# Patient Record
Sex: MALE | Race: WHITE | Employment: FULL TIME | URBAN - METROPOLITAN AREA
[De-identification: names, ages, dates, MRNs, and addresses within clinical notes are randomized per-mention and may not be internally consistent; named-entity substitution may affect disease eponyms.]

---

## 2019-06-11 ENCOUNTER — OFFICE VISIT (OUTPATIENT)
Dept: FAMILY MEDICINE CLINIC | Facility: CLINIC | Age: 29
End: 2019-06-11
Payer: MEDICAID

## 2019-06-11 VITALS
TEMPERATURE: 99 F | SYSTOLIC BLOOD PRESSURE: 114 MMHG | HEIGHT: 70.9 IN | WEIGHT: 315 LBS | BODY MASS INDEX: 44.1 KG/M2 | HEART RATE: 69 BPM | DIASTOLIC BLOOD PRESSURE: 81 MMHG

## 2019-06-11 DIAGNOSIS — I10 ESSENTIAL HYPERTENSION: Primary | ICD-10-CM

## 2019-06-11 DIAGNOSIS — J45.40 MODERATE PERSISTENT ASTHMA WITHOUT COMPLICATION: ICD-10-CM

## 2019-06-11 PROBLEM — J45.909 ASTHMA: Status: ACTIVE | Noted: 2019-06-11

## 2019-06-11 PROBLEM — J45.909 ASTHMA (HCC): Status: ACTIVE | Noted: 2019-06-11

## 2019-06-11 PROCEDURE — 99212 OFFICE O/P EST SF 10 MIN: CPT | Performed by: FAMILY MEDICINE

## 2019-06-11 PROCEDURE — 99203 OFFICE O/P NEW LOW 30 MIN: CPT | Performed by: FAMILY MEDICINE

## 2019-06-11 RX ORDER — DOXYCYCLINE HYCLATE 100 MG/1
CAPSULE ORAL
Refills: 0 | COMMUNITY
Start: 2019-05-22 | End: 2019-06-11

## 2019-06-11 RX ORDER — CETIRIZINE HYDROCHLORIDE 10 MG/1
TABLET ORAL
Refills: 0 | COMMUNITY
Start: 2019-06-03 | End: 2019-11-20

## 2019-06-11 RX ORDER — ERGOCALCIFEROL 1.25 MG/1
CAPSULE ORAL WEEKLY
Refills: 0 | COMMUNITY
Start: 2019-06-10

## 2019-06-11 RX ORDER — BUDESONIDE AND FORMOTEROL FUMARATE DIHYDRATE 160; 4.5 UG/1; UG/1
AEROSOL RESPIRATORY (INHALATION)
Refills: 0 | COMMUNITY
Start: 2019-05-20 | End: 2019-06-11

## 2019-06-11 RX ORDER — LOSARTAN POTASSIUM 100 MG/1
TABLET ORAL
Refills: 5 | COMMUNITY
Start: 2019-04-10 | End: 2019-06-11

## 2019-06-11 RX ORDER — IPRATROPIUM BROMIDE AND ALBUTEROL SULFATE 2.5; .5 MG/3ML; MG/3ML
SOLUTION RESPIRATORY (INHALATION)
Refills: 0 | COMMUNITY
Start: 2019-06-07 | End: 2019-06-11

## 2019-06-11 RX ORDER — CEFDINIR 300 MG/1
CAPSULE ORAL
Refills: 0 | COMMUNITY
Start: 2019-06-04 | End: 2019-07-11

## 2019-06-11 RX ORDER — IPRATROPIUM BROMIDE AND ALBUTEROL SULFATE 2.5; .5 MG/3ML; MG/3ML
SOLUTION RESPIRATORY (INHALATION)
Qty: 1 VIAL | Refills: 5 | Status: ON HOLD | OUTPATIENT
Start: 2019-06-11 | End: 2020-01-10

## 2019-06-11 RX ORDER — METOCLOPRAMIDE 10 MG/1
TABLET ORAL
Refills: 0 | COMMUNITY
Start: 2019-05-25 | End: 2019-07-11

## 2019-06-11 RX ORDER — NAPROXEN 375 MG/1
TABLET ORAL
Refills: 0 | COMMUNITY
Start: 2019-06-03 | End: 2019-06-11

## 2019-06-11 RX ORDER — ZOLPIDEM TARTRATE 10 MG/1
TABLET ORAL
Refills: 0 | COMMUNITY
Start: 2019-01-08 | End: 2019-06-11

## 2019-06-11 RX ORDER — LOSARTAN POTASSIUM 100 MG/1
100 TABLET ORAL DAILY
Qty: 30 TABLET | Refills: 5 | Status: SHIPPED | OUTPATIENT
Start: 2019-06-11 | End: 2019-11-04 | Stop reason: ALTCHOICE

## 2019-06-11 RX ORDER — 1.1% SODIUM FLUORIDE 11 MG/G
GEL DENTAL DAILY
Refills: 5 | COMMUNITY
Start: 2019-03-20 | End: 2019-09-12

## 2019-06-11 RX ORDER — LORAZEPAM 0.5 MG/1
TABLET ORAL
Refills: 0 | COMMUNITY
Start: 2019-06-04 | End: 2019-07-11

## 2019-06-11 RX ORDER — ALPRAZOLAM 0.5 MG/1
TABLET ORAL
Refills: 0 | COMMUNITY
Start: 2019-05-22 | End: 2019-07-11

## 2019-06-11 RX ORDER — FLUTICASONE PROPIONATE 50 MCG
SPRAY, SUSPENSION (ML) NASAL
Qty: 1 INHALER | Refills: 5 | Status: SHIPPED | OUTPATIENT
Start: 2019-06-11 | End: 2020-03-10

## 2019-06-11 RX ORDER — BUDESONIDE AND FORMOTEROL FUMARATE DIHYDRATE 160; 4.5 UG/1; UG/1
AEROSOL RESPIRATORY (INHALATION)
Qty: 1 INHALER | Refills: 5 | Status: SHIPPED | OUTPATIENT
Start: 2019-06-11 | End: 2019-08-20

## 2019-06-11 RX ORDER — FLUTICASONE PROPIONATE 50 MCG
SPRAY, SUSPENSION (ML) NASAL
Refills: 0 | COMMUNITY
Start: 2019-06-04 | End: 2019-06-11

## 2019-06-11 RX ORDER — PREDNISONE 1 MG/1
TABLET ORAL
Refills: 0 | COMMUNITY
Start: 2019-06-04 | End: 2019-06-11

## 2019-06-11 NOTE — PROGRESS NOTES
HPI:    Patient ID: Verónica Miranda is a 33year old male. HPI  Patient presents with:  Establish Care: Patient here to establish care  Asthma and hypertension  Review of Systems   Constitutional: Negative. HENT: Negative.     Respiratory: Positive for hypertension  (primary encounter diagnosis)  Moderate persistent asthma without complication    At this time he is stable. He had five years of medical records with him copied. I reviewed with most of it being acute visits. I did not scan.   Renewed asthma

## 2019-07-09 RX ORDER — METOCLOPRAMIDE 10 MG/1
TABLET ORAL
Qty: 30 TABLET | Refills: 0 | OUTPATIENT
Start: 2019-07-09

## 2019-07-09 NOTE — TELEPHONE ENCOUNTER
Review pended refill request as it does not fall under a protocol.     Requested Prescriptions     Pending Prescriptions Disp Refills   • Metoclopramide HCl 10 MG Oral Tab 30 tablet 0     Sig: TAKE 1 TABLET BY MOUTH ONCE DAILY AS NEEDED         Recent Visit

## 2019-07-10 NOTE — TELEPHONE ENCOUNTER
Refusal reason: Appt required, please call patient     Call center please call and schedule an appointment. Thank You.

## 2019-07-11 ENCOUNTER — OFFICE VISIT (OUTPATIENT)
Dept: FAMILY MEDICINE CLINIC | Facility: CLINIC | Age: 29
End: 2019-07-11
Payer: MEDICAID

## 2019-07-11 VITALS
DIASTOLIC BLOOD PRESSURE: 83 MMHG | TEMPERATURE: 98 F | WEIGHT: 315 LBS | HEART RATE: 90 BPM | BODY MASS INDEX: 44.1 KG/M2 | HEIGHT: 70.9 IN | SYSTOLIC BLOOD PRESSURE: 137 MMHG

## 2019-07-11 DIAGNOSIS — R51.9 FREQUENT HEADACHES: ICD-10-CM

## 2019-07-11 DIAGNOSIS — K58.0 IRRITABLE BOWEL SYNDROME WITH DIARRHEA: Primary | ICD-10-CM

## 2019-07-11 PROCEDURE — 99213 OFFICE O/P EST LOW 20 MIN: CPT | Performed by: PHYSICIAN ASSISTANT

## 2019-07-11 RX ORDER — PREDNISONE 1 MG/1
TABLET ORAL AS NEEDED
COMMUNITY
End: 2019-09-12

## 2019-07-11 RX ORDER — METOCLOPRAMIDE 10 MG/1
TABLET ORAL
Qty: 30 TABLET | Refills: 0 | Status: SHIPPED | OUTPATIENT
Start: 2019-07-11 | End: 2019-08-05 | Stop reason: ALTCHOICE

## 2019-07-11 RX ORDER — ALBUTEROL SULFATE 90 UG/1
AEROSOL, METERED RESPIRATORY (INHALATION)
COMMUNITY
End: 2019-11-04

## 2019-07-11 NOTE — PROGRESS NOTES
HPI:    Patient ID: Hamp Crigler is a 33year old male. Patient presents for headaches and medication refill. Patient wants a refill of his Reglan because he has frequent nausea due to IBSD. He takes Reglan which helps relieve the nausea.  He was last Rfl: 0   Albuterol Sulfate HFA (PROAIR HFA) 108 (90 Base) MCG/ACT Inhalation Aero Soln ProAir HFA 90 mcg/actuation aerosol inhaler Inhale 2 puffs every 4 hours by inhalation route.  Disp:  Rfl:    ergocalciferol 98731 units Oral Cap  Disp:  Rfl: 0   SF 1.1 round, and reactive to light. Conjunctivae and EOM are normal. Right eye exhibits no discharge. Left eye exhibits no discharge. Neck: Normal range of motion. Neck supple. Cardiovascular: Normal rate, regular rhythm and normal heart sounds.     Pulmonary

## 2019-07-15 ENCOUNTER — OFFICE VISIT (OUTPATIENT)
Dept: FAMILY MEDICINE CLINIC | Facility: CLINIC | Age: 29
End: 2019-07-15
Payer: MEDICAID

## 2019-07-15 VITALS
HEART RATE: 88 BPM | DIASTOLIC BLOOD PRESSURE: 93 MMHG | WEIGHT: 315 LBS | TEMPERATURE: 99 F | SYSTOLIC BLOOD PRESSURE: 139 MMHG | HEIGHT: 70.9 IN | BODY MASS INDEX: 44.1 KG/M2

## 2019-07-15 DIAGNOSIS — J45.40 MODERATE PERSISTENT ASTHMA, UNSPECIFIED WHETHER COMPLICATED: Primary | ICD-10-CM

## 2019-07-15 DIAGNOSIS — F41.9 ANXIETY: ICD-10-CM

## 2019-07-15 PROCEDURE — 99213 OFFICE O/P EST LOW 20 MIN: CPT | Performed by: PHYSICIAN ASSISTANT

## 2019-07-15 RX ORDER — LORAZEPAM 0.5 MG/1
TABLET ORAL AS NEEDED
Refills: 0 | COMMUNITY
Start: 2019-07-14 | End: 2020-07-16

## 2019-07-15 NOTE — PROGRESS NOTES
HPI:    Patient ID: Barbie Ledezma is a 33year old male. Patient presents for a request for a referral for a pulmonologist. Patient sees pulmonology to for adjustment of his asthma medications.  Patient takes Symbicort and ipratropium/albuterol nebulize LORazepam 0.5 MG Oral Tab  Disp:  Rfl: 0     Allergies:  Haldol [Haloperidol]    OTHER (SEE COMMENTS)    Comment:Cannot sleep, psychiatric problem  Mucinex Dm Maximum *    OTHER (SEE COMMENTS)    Comment:Stomach cramps  Zofran [Ondansetron]    OTHER (SEE Prescriptions      No prescriptions requested or ordered in this encounter       Imaging & Referrals:  PULMONARY - INTERNAL  OP REFERRAL TO SW[facility]NIL TSAI         AJ#[id number]

## 2019-08-05 ENCOUNTER — TELEPHONE (OUTPATIENT)
Dept: GASTROENTEROLOGY | Facility: CLINIC | Age: 29
End: 2019-08-05

## 2019-08-05 ENCOUNTER — OFFICE VISIT (OUTPATIENT)
Dept: GASTROENTEROLOGY | Facility: CLINIC | Age: 29
End: 2019-08-05
Payer: MEDICAID

## 2019-08-05 VITALS
WEIGHT: 315 LBS | SYSTOLIC BLOOD PRESSURE: 134 MMHG | DIASTOLIC BLOOD PRESSURE: 87 MMHG | BODY MASS INDEX: 45.1 KG/M2 | HEART RATE: 101 BPM | HEIGHT: 70 IN

## 2019-08-05 DIAGNOSIS — R11.2 NAUSEA AND VOMITING, INTRACTABILITY OF VOMITING NOT SPECIFIED, UNSPECIFIED VOMITING TYPE: Primary | ICD-10-CM

## 2019-08-05 DIAGNOSIS — K58.0 IRRITABLE BOWEL SYNDROME WITH DIARRHEA: ICD-10-CM

## 2019-08-05 PROCEDURE — 99244 OFF/OP CNSLTJ NEW/EST MOD 40: CPT | Performed by: INTERNAL MEDICINE

## 2019-08-05 RX ORDER — METOCLOPRAMIDE 5 MG/1
10 TABLET ORAL DAILY
Qty: 30 TABLET | Refills: 0 | Status: ON HOLD | OUTPATIENT
Start: 2019-08-05 | End: 2020-01-10

## 2019-08-05 NOTE — TELEPHONE ENCOUNTER
We received medical records from [facility] stating that there are No EGD or Colonoscopy from Jan 2015 until but we received labs, CT and US of abdomen placed on Dr Babita Richardson desk for review

## 2019-08-05 NOTE — H&P
[facility] - Gastroenterology  Clinic History and Physical     Patient presents with:  Gastro-esophageal Reflux  IBS-D    HPI:   Davonte Akins is a 33year old year-old male with history of asthma, anxiety, bipolar disorder, ADHD who presents to  Smoker      Smokeless tobacco: Never Used    Alcohol use: Never      Frequency: Never    Drug use: Never       Medications (Active prior to today's visit):    Current Outpatient Medications:  Metoclopramide HCl 5 MG Oral Tab Take 2 tablets (10 mg total) by intolerance and heat intolerance  MUSCULOSKELETAL:  negative for joint effusion/severe erythema  BEHAVIOR/PSYCH:  negative for psychotic behavior      PHYSICAL EXAM:   /87 (BP Location: Left arm, Patient Position: Sitting, Cuff Size: large)   Pulse 1 FODMAP diet  Reglan or metoclopramide is a medication used to treat gastroparesis or abnormal stomach emptying. The drug is effective in this regard and also helps with nausea and vomiting.  Is going   Given that patient has been stable for >3 years on cesar

## 2019-08-05 NOTE — PATIENT INSTRUCTIONS
Obtain records from prior GI-- (per patient stool test 48 collection, breath test, labs etc.)  PPI recommended but will take as needed as current regimen works  Does not want colonoscopy given workup was done and age.  No weight loss or blood in the stool

## 2019-08-06 ENCOUNTER — OFFICE VISIT (OUTPATIENT)
Dept: PULMONOLOGY | Facility: CLINIC | Age: 29
End: 2019-08-06
Payer: MEDICAID

## 2019-08-06 VITALS
RESPIRATION RATE: 18 BRPM | OXYGEN SATURATION: 93 % | HEIGHT: 70 IN | DIASTOLIC BLOOD PRESSURE: 83 MMHG | HEART RATE: 91 BPM | WEIGHT: 315 LBS | BODY MASS INDEX: 45.1 KG/M2 | SYSTOLIC BLOOD PRESSURE: 131 MMHG

## 2019-08-06 DIAGNOSIS — R06.00 DYSPNEA, UNSPECIFIED TYPE: Primary | ICD-10-CM

## 2019-08-06 DIAGNOSIS — G47.10 HYPERSOMNIA: ICD-10-CM

## 2019-08-06 PROCEDURE — 99244 OFF/OP CNSLTJ NEW/EST MOD 40: CPT | Performed by: INTERNAL MEDICINE

## 2019-08-06 RX ORDER — MONTELUKAST SODIUM 10 MG/1
10 TABLET ORAL NIGHTLY
Qty: 30 TABLET | Refills: 5 | Status: SHIPPED | OUTPATIENT
Start: 2019-08-06 | End: 2020-01-27

## 2019-08-06 NOTE — H&P
Referring Physician  Jade Dempsey DO    Chief Complaint  Asthma    History of Present Illness  Patient is a 30-year-old male who presents to pulmonary clinic for initial visit.   He admits to being diagnosed in asthma in 2014 after evidence of multifoca mg total) by mouth daily.  Disp: 30 tablet Rfl: 0   LORazepam 0.5 MG Oral Tab  Disp:  Rfl: 0   Albuterol Sulfate HFA (PROAIR HFA) 108 (90 Base) MCG/ACT Inhalation Aero Soln ProAir HFA 90 mcg/actuation aerosol inhaler Inhale 2 puffs every 4 hours by [employer] testing for the patient. In terms of maintenance inhaler therapy I will have him on scheduled ICS/LABA and monitor response. For now I will hold off on Stiolto which is not covered under his insurance. I encouraged use of nebulizer treatments.   I urged

## 2019-08-20 ENCOUNTER — TELEPHONE (OUTPATIENT)
Dept: NEUROLOGY | Facility: CLINIC | Age: 29
End: 2019-08-20

## 2019-08-20 ENCOUNTER — OFFICE VISIT (OUTPATIENT)
Dept: NEUROLOGY | Facility: CLINIC | Age: 29
End: 2019-08-20
Payer: MEDICAID

## 2019-08-20 ENCOUNTER — TELEPHONE (OUTPATIENT)
Dept: PULMONOLOGY | Facility: CLINIC | Age: 29
End: 2019-08-20

## 2019-08-20 VITALS
HEIGHT: 71 IN | DIASTOLIC BLOOD PRESSURE: 100 MMHG | BODY MASS INDEX: 44.1 KG/M2 | SYSTOLIC BLOOD PRESSURE: 154 MMHG | WEIGHT: 315 LBS | HEART RATE: 88 BPM | RESPIRATION RATE: 20 BRPM

## 2019-08-20 DIAGNOSIS — R51.9 NEW ONSET HEADACHE: Primary | ICD-10-CM

## 2019-08-20 DIAGNOSIS — G47.33 OSA (OBSTRUCTIVE SLEEP APNEA): Primary | ICD-10-CM

## 2019-08-20 PROCEDURE — 99204 OFFICE O/P NEW MOD 45 MIN: CPT | Performed by: OTHER

## 2019-08-20 NOTE — TELEPHONE ENCOUNTER
Alejandro for [facility] [employer] for authorization of approval for MRI brain w/wo cpt code 77374. Case Number: [id number], will fax clinical note.  Faxed note pending approval.

## 2019-08-20 NOTE — PROGRESS NOTES
Neurology Initial Visit     Referred By: Dr. Pina ref. provider found    Chief Complaint: Patient presents with:  Headache: new right handed patient c/o headaches that started in july 2019. Has been getting headaches daily lasting 10-20 min.  Takes advil, w daily., Disp: , Rfl:   •  Fluticasone Furoate-Vilanterol (BREO ELLIPTA) 200-25 MCG/INH Inhalation Aerosol Powder, Breath Activated, Inhale 1 puff into the lungs daily. , Disp: 1 each, Rfl: 5  •  Metoclopramide HCl 5 MG Oral Tab, Take 2 tablets (10 mg total) intake, smell preserved, normal glutition  Neck- No masses or adenopathy  Cv: pulses were palpable and normal, no cyanosis or edema     Neurological:     Mental Status- Alert and oriented x3.   Normal attention span and concentration  Thought process intact patient. Instructed patient to call my office or seek medical attention immediately if symptoms worsen. Patient verbalized understanding of information given. All questions were answered. All side effects of drugs were discussed.      Return to clinic in:

## 2019-08-26 ENCOUNTER — TELEPHONE (OUTPATIENT)
Dept: PULMONOLOGY | Facility: CLINIC | Age: 29
End: 2019-08-26

## 2019-08-26 NOTE — TELEPHONE ENCOUNTER
Pt calling to advise that rx:Brio only covered for 90day. Pt asking to discuss an alternative medication other than rx:Sinpacourt, not covered either. Pt can not Mary William it interferes with his breathing. Pls call pt at:[phone],thanks.

## 2019-08-28 NOTE — TELEPHONE ENCOUNTER
Spoke with pt regarding message below. Pt states he picked up prescription for Breo but will only be covered for 90 days, looking for alternative medication.  Informed pt to call insurance company to find out what alternative medication would be covered and

## 2019-08-29 NOTE — ED PROVIDER NOTES
Patient Seen in: [facility] AND CLINICS Immediate Care In [address] [place] [address]    History   Patient presents with:  Chest Pain Angina (cardiovascular)    Stated Complaint: CHEST PAIN/ANXIETY    HPI    32 yo male with h/o severe anxiety who is started in [facility] program for a Head: Normocephalic and atraumatic. Mouth/Throat: Oropharynx is clear and moist.   Eyes: Pupils are equal, round, and reactive to light. Conjunctivae and EOM are normal.   Neck: Normal range of motion. Neck supple.    Cardiovascular: Normal rate, regula

## 2019-08-29 NOTE — ED INITIAL ASSESSMENT (HPI)
Patient reports that he is having a high amount of anxiety and has been having problems for a couple of hours.   Patient reports he went to a psychiatric hospital last night and was told he needs to get into a support program.  Patient reports he was not gi

## 2019-09-05 ENCOUNTER — HOSPITAL ENCOUNTER (OUTPATIENT)
Dept: MRI IMAGING | Facility: HOSPITAL | Age: 29
Discharge: HOME | End: 2019-09-05
Attending: Other
Payer: MEDICAID

## 2019-09-05 DIAGNOSIS — R51.9 NEW ONSET HEADACHE: ICD-10-CM

## 2019-09-05 LAB — CREAT BLD-MCNC: 0.9 MG/DL (ref 0.7–1.3)

## 2019-09-05 PROCEDURE — 70553 MRI BRAIN STEM W/O & W/DYE: CPT | Performed by: OTHER

## 2019-09-05 PROCEDURE — A9575 INJ GADOTERATE MEGLUMI 0.1ML: HCPCS | Performed by: OTHER

## 2019-09-05 PROCEDURE — 82565 ASSAY OF CREATININE: CPT

## 2019-09-05 NOTE — TELEPHONE ENCOUNTER
For some reason it appears only split-night is covered. I have ordered split-night polysomnography for the patient.

## 2019-09-06 NOTE — TELEPHONE ENCOUNTER
Message left informing pt home sleep study was denied and Dr. Kelly Hackett message below, and to call sleep center to schedule sleep study [phone].

## 2019-09-09 ENCOUNTER — TELEPHONE (OUTPATIENT)
Dept: NEUROLOGY | Facility: CLINIC | Age: 29
End: 2019-09-09

## 2019-09-09 NOTE — TELEPHONE ENCOUNTER
Spoke with Fernando Lincoln at [facility] DR GILL ESTRADA regarding PA. Number to obtain PA [phone] and ID# [id number]. Spoke with Arlen Marie from [employer], attempted to do PA for [facility]. Per Marylene Brookes not covered.  Arlen Marie states generic version for Airduo (flut

## 2019-09-09 NOTE — TELEPHONE ENCOUNTER
Pt states that pharmacy told him that this office can do prior auth on Breo and it would be covered. Please call. OK to leave detailed message.

## 2019-09-09 NOTE — TELEPHONE ENCOUNTER
Spoke with pt regarding message below/perferred medication (fluticasone-salmeterol). Pt states generic versions usually don't work on him. but has not tried fluticasone-salmeterol before. Informed pt message routed to Dr. Clark.  Pt verbalized understandi

## 2019-09-12 ENCOUNTER — OFFICE VISIT (OUTPATIENT)
Dept: FAMILY MEDICINE CLINIC | Facility: CLINIC | Age: 29
End: 2019-09-12
Payer: MEDICAID

## 2019-09-12 VITALS
BODY MASS INDEX: 51 KG/M2 | SYSTOLIC BLOOD PRESSURE: 129 MMHG | HEART RATE: 93 BPM | WEIGHT: 315 LBS | DIASTOLIC BLOOD PRESSURE: 84 MMHG

## 2019-09-12 DIAGNOSIS — R51.9 FREQUENT HEADACHES: Primary | ICD-10-CM

## 2019-09-12 DIAGNOSIS — H47.393 DISORDER OF OPTIC DISC OF BOTH EYES: ICD-10-CM

## 2019-09-12 PROCEDURE — 99213 OFFICE O/P EST LOW 20 MIN: CPT | Performed by: FAMILY MEDICINE

## 2019-09-12 RX ORDER — GABAPENTIN 300 MG/1
CAPSULE ORAL
Refills: 0 | COMMUNITY
Start: 2019-09-03

## 2019-09-12 NOTE — PROGRESS NOTES
HPI:    Patient ID: Verónica Miranda is a 33year old male.     HPI  Patient presents with:  Referral: pt will need referal to Opthalmologist, Dr. Amber Mcdaniel ordered MRI and told him to get referral due to abnormal results  Sore Throat    Review of Systems   C [Ondansetron]    OTHER (SEE COMMENTS)    Comment:constipation   PHYSICAL EXAM:   Physical Exam   Constitutional: He appears well-developed and well-nourished. HENT:   Head: Normocephalic.    Right Ear: Tympanic membrane normal.   Left Ear: Tympanic Joseph

## 2019-09-13 RX ORDER — FLUTICASONE PROPIONATE AND SALMETEROL 113; 14 UG/1; UG/1
1 POWDER, METERED RESPIRATORY (INHALATION) 2 TIMES DAILY
Qty: 1 EACH | Refills: 5 | Status: SHIPPED | OUTPATIENT
Start: 2019-09-13 | End: 2020-07-16

## 2019-09-16 ENCOUNTER — HOSPITAL ENCOUNTER (OUTPATIENT)
Age: 29
Discharge: HOME | End: 2019-09-16
Attending: EMERGENCY MEDICINE
Payer: MEDICAID

## 2019-09-16 ENCOUNTER — APPOINTMENT (OUTPATIENT)
Dept: GENERAL RADIOLOGY | Age: 29
End: 2019-09-16
Attending: EMERGENCY MEDICINE
Payer: MEDICAID

## 2019-09-16 VITALS
OXYGEN SATURATION: 96 % | RESPIRATION RATE: 18 BRPM | HEART RATE: 91 BPM | DIASTOLIC BLOOD PRESSURE: 83 MMHG | TEMPERATURE: 98 F | SYSTOLIC BLOOD PRESSURE: 120 MMHG

## 2019-09-16 DIAGNOSIS — J20.9 ACUTE BRONCHITIS WITH BRONCHOSPASM: Primary | ICD-10-CM

## 2019-09-16 LAB — S PYO AG THROAT QL: NEGATIVE

## 2019-09-16 PROCEDURE — 71046 X-RAY EXAM CHEST 2 VIEWS: CPT | Performed by: EMERGENCY MEDICINE

## 2019-09-16 PROCEDURE — 99214 OFFICE O/P EST MOD 30 MIN: CPT

## 2019-09-16 PROCEDURE — 87430 STREP A AG IA: CPT

## 2019-09-16 PROCEDURE — 94640 AIRWAY INHALATION TREATMENT: CPT

## 2019-09-16 RX ORDER — PREDNISONE 20 MG/1
40 TABLET ORAL DAILY
Qty: 10 TABLET | Refills: 0 | Status: SHIPPED | OUTPATIENT
Start: 2019-09-16 | End: 2019-09-21

## 2019-09-16 RX ORDER — ALBUTEROL SULFATE 2.5 MG/3ML
2.5 SOLUTION RESPIRATORY (INHALATION) EVERY 4 HOURS PRN
Qty: 30 AMPULE | Refills: 0 | Status: SHIPPED | OUTPATIENT
Start: 2019-09-16 | End: 2019-10-16

## 2019-09-16 RX ORDER — IPRATROPIUM BROMIDE AND ALBUTEROL SULFATE 2.5; .5 MG/3ML; MG/3ML
3 SOLUTION RESPIRATORY (INHALATION) ONCE
Status: COMPLETED | OUTPATIENT
Start: 2019-09-16 | End: 2019-09-16

## 2019-09-16 RX ORDER — AMOXICILLIN AND CLAVULANATE POTASSIUM 875; 125 MG/1; MG/1
1 TABLET, FILM COATED ORAL 2 TIMES DAILY
Qty: 20 TABLET | Refills: 0 | Status: SHIPPED | OUTPATIENT
Start: 2019-09-16 | End: 2019-09-26

## 2019-09-16 NOTE — ED PROVIDER NOTES
Patient Seen in: [facility] AND CLINICS Immediate Care In [address] [place] [address]    History   Patient presents with:  Cough/URI    Stated Complaint: cough    HPI    The patient is a 65-year-old male with past history of asthma, hypertension, bipolar disorder who presents n Physical Exam    Constitutional: Well-developed well-nourished in no acute distress  Head: Normocephalic, no swelling or tenderness  Eyes: Nonicteric sclera, no conjunctival injection  ENT: TMs are clear and flat bilaterally.   There is no posterior (four) hours as needed for Wheezing or Shortness of Breath., Normal, Disp-30 ampule, R-0

## 2019-09-19 ENCOUNTER — OFFICE VISIT (OUTPATIENT)
Dept: PULMONOLOGY | Facility: CLINIC | Age: 29
End: 2019-09-19
Payer: MEDICAID

## 2019-09-19 VITALS
HEIGHT: 70 IN | SYSTOLIC BLOOD PRESSURE: 122 MMHG | HEART RATE: 83 BPM | BODY MASS INDEX: 45.1 KG/M2 | WEIGHT: 315 LBS | DIASTOLIC BLOOD PRESSURE: 85 MMHG | OXYGEN SATURATION: 94 %

## 2019-09-19 DIAGNOSIS — R05.9 COUGH: Primary | ICD-10-CM

## 2019-09-19 PROCEDURE — 99213 OFFICE O/P EST LOW 20 MIN: CPT | Performed by: INTERNAL MEDICINE

## 2019-09-19 RX ORDER — IPRATROPIUM BROMIDE AND ALBUTEROL SULFATE 2.5; .5 MG/3ML; MG/3ML
3 SOLUTION RESPIRATORY (INHALATION) 3 TIMES DAILY
Qty: 90 VIAL | Refills: 5 | Status: SHIPPED | OUTPATIENT
Start: 2019-09-19 | End: 2020-12-17 | Stop reason: ALTCHOICE

## 2019-09-19 RX ORDER — ALBUTEROL SULFATE 90 UG/1
2 AEROSOL, METERED RESPIRATORY (INHALATION) EVERY 6 HOURS PRN
Qty: 1 INHALER | Refills: 5 | Status: ON HOLD | OUTPATIENT
Start: 2019-09-19 | End: 2020-01-10

## 2019-09-19 NOTE — PROGRESS NOTES
Referring Physician  Jessie Robbins DO    History of Present Illness  Patient seen today for follow-up visit to pulmonary clinic. Admits to currently recovering from a bout of acute bronchitis.   States he had productive cough which is currently improvin SYMPTOMS Disp:  Rfl: 0   ergocalciferol 83401 units Oral Cap once a week. Disp:  Rfl: 0   losartan 100 MG Oral Tab Take 1 tablet (100 mg total) by mouth daily.  Disp: 30 tablet Rfl: 5   ipratropium-albuterol 0.5-2.5 (3) MG/3ML Inhalation Solution USE 3 ML

## 2019-10-01 ENCOUNTER — TELEPHONE (OUTPATIENT)
Dept: FAMILY MEDICINE CLINIC | Facility: CLINIC | Age: 29
End: 2019-10-01

## 2019-10-01 NOTE — TELEPHONE ENCOUNTER
Per patient the pharmacy advsied this medication is on recall. He is requesting alternative.        Current Outpatient Medications:  Losartan

## 2019-10-04 RX ORDER — VALSARTAN 80 MG/1
80 TABLET ORAL DAILY
Qty: 30 TABLET | Refills: 1 | Status: SHIPPED | OUTPATIENT
Start: 2019-10-04 | End: 2019-10-05 | Stop reason: RX

## 2019-10-05 NOTE — TELEPHONE ENCOUNTER
I called the patient and informed. The patient stated the pharmacy received a new batch of the Losartan and he will continue to take Losartan. He picked up a new refill yesterday. I canceled the Valsartan on his record.

## 2019-10-21 ENCOUNTER — TELEPHONE (OUTPATIENT)
Dept: PULMONOLOGY | Facility: CLINIC | Age: 29
End: 2019-10-21

## 2019-10-21 NOTE — TELEPHONE ENCOUNTER
Brigido/WILEY calling for indicates rx:Brio not covered by insurance, alter rx:Fluticason-Salmeterol  232-14, no prior authorization needed, only new script. Please call at:[phone],thanks.

## 2019-10-23 RX ORDER — FLUTICASONE PROPIONATE AND SALMETEROL 232; 14 UG/1; UG/1
1 POWDER, METERED RESPIRATORY (INHALATION) 2 TIMES DAILY
Qty: 1 EACH | Refills: 5 | Status: SHIPPED | OUTPATIENT
Start: 2019-10-23 | End: 2019-10-24

## 2019-10-23 NOTE — TELEPHONE ENCOUNTER
You may let the patient know that I have prescribed Airduo for him. For some reason it would not send E prescription so I have made print out.

## 2019-10-24 NOTE — TELEPHONE ENCOUNTER
Pt informed Breo not covered and Dr. Edgardo Dominguez has prescribed Airduo. Pt states he has a discount card for [facility] and will use at this time. Pt states he will call back if he decides to switch to Airduo. Pt states he will continue with Breo at this time.

## 2019-11-04 ENCOUNTER — OFFICE VISIT (OUTPATIENT)
Dept: GASTROENTEROLOGY | Facility: CLINIC | Age: 29
End: 2019-11-04
Payer: MEDICAID

## 2019-11-04 ENCOUNTER — TELEPHONE (OUTPATIENT)
Dept: GASTROENTEROLOGY | Facility: CLINIC | Age: 29
End: 2019-11-04

## 2019-11-04 ENCOUNTER — TELEPHONE (OUTPATIENT)
Dept: PULMONOLOGY | Facility: CLINIC | Age: 29
End: 2019-11-04

## 2019-11-04 VITALS
BODY MASS INDEX: 45.1 KG/M2 | SYSTOLIC BLOOD PRESSURE: 127 MMHG | HEART RATE: 101 BPM | DIASTOLIC BLOOD PRESSURE: 80 MMHG | HEIGHT: 70 IN | WEIGHT: 315 LBS

## 2019-11-04 DIAGNOSIS — R11.2 NAUSEA AND VOMITING, INTRACTABILITY OF VOMITING NOT SPECIFIED, UNSPECIFIED VOMITING TYPE: ICD-10-CM

## 2019-11-04 DIAGNOSIS — K58.0 IRRITABLE BOWEL SYNDROME WITH DIARRHEA: ICD-10-CM

## 2019-11-04 DIAGNOSIS — K21.9 GASTROESOPHAGEAL REFLUX DISEASE, ESOPHAGITIS PRESENCE NOT SPECIFIED: Primary | ICD-10-CM

## 2019-11-04 PROCEDURE — 99214 OFFICE O/P EST MOD 30 MIN: CPT | Performed by: INTERNAL MEDICINE

## 2019-11-04 RX ORDER — METOCLOPRAMIDE 10 MG/1
TABLET ORAL
Refills: 0 | COMMUNITY
Start: 2019-10-19 | End: 2019-11-04

## 2019-11-04 RX ORDER — VALSARTAN 80 MG/1
80 TABLET ORAL
Refills: 1 | COMMUNITY
Start: 2019-10-30 | End: 2019-12-27

## 2019-11-04 RX ORDER — 1.1% SODIUM FLUORIDE 11 MG/G
GEL DENTAL
Refills: 5 | COMMUNITY
Start: 2019-10-31

## 2019-11-04 RX ORDER — METOCLOPRAMIDE 10 MG/1
10 TABLET ORAL EVERY 6 HOURS PRN
Qty: 90 TABLET | Refills: 3 | Status: SHIPPED | OUTPATIENT
Start: 2019-11-04 | End: 2020-02-02

## 2019-11-04 RX ORDER — PREDNISONE 1 MG/1
TABLET ORAL
Refills: 0 | COMMUNITY
Start: 2019-10-19 | End: 2020-12-17 | Stop reason: ALTCHOICE

## 2019-11-04 NOTE — PROGRESS NOTES
[address] Gastroenterology  Clinic Follow-up Visit    Patient presents with: Follow - Up    Subjective/HPI:   Tigist Hernandez is a 26year old year-old male, patient of Dr. Kathy Segura with history of bipolar and ADHD, who presents for follow up.      P age. No weight loss or blood in the stool  Discussed EGD, will get records now and consider in the future  Low FODMAP diet  Reglan or metoclopramide is a medication used to treat gastroparesis or abnormal stomach emptying.  The drug is effective in this reg Hypertension Father    • Cancer Maternal Grandmother         lung cancer   • Cancer Paternal Grandmother         lung cancer, colon cancer   • Cancer Paternal Grandfather         colon cancer      Social History: Social History    Tobacco Use      Smoking • ipratropium-albuterol 0.5-2.5 (3) MG/3ML Inhalation Solution Take 3 mL by nebulization 3 (three) times daily.  90 vial 5   • Fluticasone-Salmeterol (AIRDUO RESPICLICK 131/19) 793-22 MCG/ACT Inhalation Aerosol Powder, Breath Activated Inhale 1 puff into and oriented x4, and patient is having movements of all 4 extremities   Psych: Pt has a normal mood and affect, behavior is normal    Nursing note and vitals reviewed      Labs/Imaging:     Patient's labs and imaging were reviewed and discussed with paticalvin understanding], including but not limited to the risks of bleeding, infection, pain, as well as the risks of anesthesia and perforation all leading to prolonged hospitalization or surgical intervention.  I also specifically mentioned the miss rate of upper

## 2019-11-04 NOTE — PATIENT INSTRUCTIONS
1. Schedule EGD with MAC [Diagnosis: nausea and reflux]    2.  Continue all medications for procedure except    ** If MAC @ [facility]:    - HOLD ACE/ARBs the night before and the day of the procedure(s)    - NO alcohol, recreational drugs nor erectile dysfunct

## 2019-11-04 NOTE — TELEPHONE ENCOUNTER
Scheduled for: EGD 97446  Provider Name: Dr Arben Conti  Date: Fri 01/10/20  Location: [facility]  Sedation: MAC  Time: 8:15 am  Prep: Nothing after midnight the day before procedure and NPO 3 hrs prior procedure  Meds/Allergies Reconciled?: Haldol, Mucinex, Zofrvivian Patterson

## 2019-11-13 ENCOUNTER — HOSPITAL ENCOUNTER (OUTPATIENT)
Age: 29
Discharge: HOME | End: 2019-11-13
Attending: EMERGENCY MEDICINE
Payer: MEDICAID

## 2019-11-13 VITALS
HEART RATE: 87 BPM | DIASTOLIC BLOOD PRESSURE: 82 MMHG | HEIGHT: 71 IN | BODY MASS INDEX: 44.1 KG/M2 | TEMPERATURE: 98 F | RESPIRATION RATE: 16 BRPM | OXYGEN SATURATION: 99 % | WEIGHT: 315 LBS | SYSTOLIC BLOOD PRESSURE: 130 MMHG

## 2019-11-13 DIAGNOSIS — G47.00 INSOMNIA, UNSPECIFIED TYPE: ICD-10-CM

## 2019-11-13 DIAGNOSIS — J45.21 MILD INTERMITTENT ASTHMA WITH EXACERBATION: Primary | ICD-10-CM

## 2019-11-13 PROCEDURE — 94640 AIRWAY INHALATION TREATMENT: CPT

## 2019-11-13 PROCEDURE — 99214 OFFICE O/P EST MOD 30 MIN: CPT

## 2019-11-13 RX ORDER — IPRATROPIUM BROMIDE AND ALBUTEROL SULFATE 2.5; .5 MG/3ML; MG/3ML
3 SOLUTION RESPIRATORY (INHALATION) ONCE
Status: COMPLETED | OUTPATIENT
Start: 2019-11-13 | End: 2019-11-13

## 2019-11-13 RX ORDER — PREDNISONE 20 MG/1
60 TABLET ORAL ONCE
Status: COMPLETED | OUTPATIENT
Start: 2019-11-13 | End: 2019-11-13

## 2019-11-13 RX ORDER — ZOLPIDEM TARTRATE 5 MG/1
5 TABLET ORAL NIGHTLY PRN
Qty: 7 TABLET | Refills: 0 | Status: SHIPPED | OUTPATIENT
Start: 2019-11-13 | End: 2019-11-20

## 2019-11-13 RX ORDER — PREDNISONE 1 MG/1
5 TABLET ORAL DAILY
Qty: 5 TABLET | Refills: 0 | Status: SHIPPED | OUTPATIENT
Start: 2019-11-13 | End: 2019-11-18

## 2019-11-13 NOTE — ED INITIAL ASSESSMENT (HPI)
Pt with hx of asthma c/o shortness of breath since last night. States he was unable to sleep. Reports dry cough.

## 2019-11-13 NOTE — ED PROVIDER NOTES
Patient Seen in: [facility] AND CLINICS Immediate Care In [address] [place] [address]      History   Patient presents with:  Breathing Problem    Stated Complaint: Trouble Breathing/Sleeping Issue    HPI    The patient is a 76-year-old male with past history of bipolar disorder Resp 16   Ht 180.3 cm (5' 11\")   Wt (!) 160.6 kg   SpO2 99%   BMI 49.37 kg/m²         Physical Exam    Constitutional: Well-developed well-nourished in no acute distress  Head: Normocephalic, no swelling or tenderness  Eyes: Nonicteric sclera, no conjunct Tab  Take 1 tablet (5 mg total) by mouth daily for 5 days. Qty: 5 tablet Refills: 0    Zolpidem Tartrate 5 MG Oral Tab  Take 1 tablet (5 mg total) by mouth nightly as needed for Sleep.   Qty: 7 tablet Refills: 0    !! - Potential duplicate medications foun

## 2019-11-20 ENCOUNTER — PATIENT MESSAGE (OUTPATIENT)
Dept: FAMILY MEDICINE CLINIC | Facility: CLINIC | Age: 29
End: 2019-11-20

## 2019-11-21 NOTE — TELEPHONE ENCOUNTER
Dr. Yadira Kelly please advise. Med has not been ordered by your before, listed as historical. Script pended.       Refill Protocol Appointment Criteria  · Appointment scheduled in the past 6 months or in the next 3 months  Recent Outpatient Visits            2

## 2019-11-21 NOTE — TELEPHONE ENCOUNTER
From: Alexa Telles  To: Jessie Robbins DO  Sent: 11/20/2019 1:49 PM CST  Subject: Prescription Question    I need a refill for my allergy medication, Cetirizine 10MG.  I don't have any day I can come in for the foreseeable future so I was hoping you cou [phone]

## 2019-11-22 RX ORDER — CETIRIZINE HYDROCHLORIDE 10 MG/1
TABLET ORAL
Qty: 30 TABLET | Refills: 1 | Status: SHIPPED | OUTPATIENT
Start: 2019-11-22 | End: 2020-01-22

## 2019-12-05 ENCOUNTER — TELEPHONE (OUTPATIENT)
Dept: PULMONOLOGY | Facility: CLINIC | Age: 29
End: 2019-12-05

## 2019-12-05 NOTE — TELEPHONE ENCOUNTER
Order from August is still valid. Pt notified and central scheduling number provided. He will call for appt.

## 2019-12-12 ENCOUNTER — HOSPITAL ENCOUNTER (OUTPATIENT)
Age: 29
Discharge: HOME | End: 2019-12-12
Attending: EMERGENCY MEDICINE
Payer: MEDICAID

## 2019-12-12 ENCOUNTER — APPOINTMENT (OUTPATIENT)
Dept: GENERAL RADIOLOGY | Age: 29
End: 2019-12-12
Attending: EMERGENCY MEDICINE
Payer: MEDICAID

## 2019-12-12 VITALS
DIASTOLIC BLOOD PRESSURE: 89 MMHG | RESPIRATION RATE: 20 BRPM | OXYGEN SATURATION: 95 % | TEMPERATURE: 97 F | WEIGHT: 315 LBS | HEIGHT: 70.5 IN | SYSTOLIC BLOOD PRESSURE: 151 MMHG | HEART RATE: 67 BPM | BODY MASS INDEX: 44.59 KG/M2

## 2019-12-12 DIAGNOSIS — J06.9 VIRAL UPPER RESPIRATORY TRACT INFECTION: Primary | ICD-10-CM

## 2019-12-12 PROCEDURE — 99213 OFFICE O/P EST LOW 20 MIN: CPT

## 2019-12-12 PROCEDURE — 99214 OFFICE O/P EST MOD 30 MIN: CPT

## 2019-12-12 PROCEDURE — 71046 X-RAY EXAM CHEST 2 VIEWS: CPT | Performed by: EMERGENCY MEDICINE

## 2019-12-12 RX ORDER — ECHINACEA PURPUREA EXTRACT 125 MG
2 TABLET ORAL AS NEEDED
Qty: 60 ML | Refills: 0 | Status: SHIPPED | OUTPATIENT
Start: 2019-12-12 | End: 2019-12-17

## 2019-12-12 RX ORDER — BENZONATATE 100 MG/1
100 CAPSULE ORAL 3 TIMES DAILY PRN
Qty: 30 CAPSULE | Refills: 0 | Status: ON HOLD | OUTPATIENT
Start: 2019-12-12 | End: 2020-01-10

## 2019-12-12 RX ORDER — FLUTICASONE PROPIONATE 50 MCG
2 SPRAY, SUSPENSION (ML) NASAL DAILY
Qty: 16 G | Refills: 0 | Status: ON HOLD | OUTPATIENT
Start: 2019-12-12 | End: 2020-01-10

## 2019-12-12 RX ORDER — ALBUTEROL SULFATE 90 UG/1
2 AEROSOL, METERED RESPIRATORY (INHALATION) EVERY 4 HOURS PRN
Qty: 1 INHALER | Refills: 0 | Status: SHIPPED | OUTPATIENT
Start: 2019-12-12 | End: 2020-01-11

## 2019-12-12 NOTE — ED PROVIDER NOTES
Patient Seen in: [facility] AND CLINICS Immediate Care In [address] [place] [address]    History   Patient presents with:  Cough/URI    Stated Complaint: nausea, cough, body ache    HPI    Patient here with cough, congestion for 7 days. No travel, no known sick contacts.   Nina Aerosol Powder, Breath Activated,  Inhale 1 puff into the lungs daily. Montelukast Sodium (SINGULAIR) 10 MG Oral Tab,  Take 1 tablet (10 mg total) by mouth nightly. Metoclopramide HCl 5 MG Oral Tab,  Take 2 tablets (10 mg total) by mouth daily.    iprat Vitals [12/12/19 0942]   /89   Pulse 67   Resp 20   Temp 97.2 °F (36.2 °C)   Temp src Temporal   SpO2 95 %   O2 Device None (Room air)       Current:/89   Pulse 67   Temp 97.2 °F (36.2 °C) (Temporal)   Resp 20   Ht 179.1 cm (5' 10.5\")   Wt Clorinda Patrick ) Wheezing. Qty: 1 Inhaler Refills: 0    !! - Potential duplicate medications found. Please discuss with provider.

## 2019-12-12 NOTE — ED INITIAL ASSESSMENT (HPI)
Pt was seen at [address] [place] [address] ed on Tuesday night with same symptoms. Flu was negative and strep was negative. Pt was given prednisone. Pt has asthma and has been using nebulizer at home. Pt is having pulmonary function test soon.

## 2019-12-16 ENCOUNTER — HOSPITAL ENCOUNTER (OUTPATIENT)
Dept: RESPIRATORY THERAPY | Facility: HOSPITAL | Age: 29
Discharge: HOME | End: 2019-12-16
Attending: INTERNAL MEDICINE
Payer: MEDICAID

## 2019-12-16 DIAGNOSIS — R06.00 DYSPNEA, UNSPECIFIED TYPE: ICD-10-CM

## 2019-12-16 PROCEDURE — 94726 PLETHYSMOGRAPHY LUNG VOLUMES: CPT | Performed by: INTERNAL MEDICINE

## 2019-12-16 PROCEDURE — 94729 DIFFUSING CAPACITY: CPT | Performed by: INTERNAL MEDICINE

## 2019-12-16 PROCEDURE — 94060 EVALUATION OF WHEEZING: CPT | Performed by: INTERNAL MEDICINE

## 2019-12-21 NOTE — ADDENDUM NOTE
Encounter addended by: Jamaal Jeffers MD on: 12/21/2019 2:34 PM   Actions taken: Clinical Note Signed, Charge Capture section accepted

## 2019-12-21 NOTE — PROCEDURES
[facility]    Patient's Name Tigist Hernandez MRN [id number]    1990 Pulmonologist Kat Chaudhari MD   Location [facility] PCP Mary Tapia DO     IMPRESSION:    The PFTs are Abnormal.    There is a mild

## 2019-12-23 NOTE — PROCEDURES
[facility]     Pulmonary Function Test     Caden Garay Patient Status:  Outpatient    1990 MRN [id number]   Date of Exam 19 PCP Anthony Yeager DO           Spirometry   FEV1: 3.48 77%  FVC: 4.05 74%  FEV1/FVC: 0.86    Pk

## 2019-12-23 NOTE — ADDENDUM NOTE
Encounter addended by: Man Anne DO on: 12/23/2019 11:46 AM   Actions taken: Clinical Note Signed, Charge Capture section accepted

## 2019-12-31 RX ORDER — VALSARTAN 80 MG/1
TABLET ORAL
Qty: 90 TABLET | Refills: 0 | Status: SHIPPED | OUTPATIENT
Start: 2019-12-31 | End: 2020-03-30

## 2020-01-03 ENCOUNTER — HOSPITAL ENCOUNTER (OUTPATIENT)
Age: 30
Discharge: HOME | End: 2020-01-03
Attending: FAMILY MEDICINE
Payer: MEDICAID

## 2020-01-03 VITALS
SYSTOLIC BLOOD PRESSURE: 135 MMHG | HEIGHT: 70 IN | OXYGEN SATURATION: 95 % | BODY MASS INDEX: 41.52 KG/M2 | HEART RATE: 93 BPM | DIASTOLIC BLOOD PRESSURE: 84 MMHG | TEMPERATURE: 99 F | WEIGHT: 290 LBS | RESPIRATION RATE: 22 BRPM

## 2020-01-03 DIAGNOSIS — J06.9 VIRAL UPPER RESPIRATORY TRACT INFECTION: Primary | ICD-10-CM

## 2020-01-03 PROCEDURE — 94640 AIRWAY INHALATION TREATMENT: CPT

## 2020-01-03 PROCEDURE — 99214 OFFICE O/P EST MOD 30 MIN: CPT

## 2020-01-03 RX ORDER — IPRATROPIUM BROMIDE AND ALBUTEROL SULFATE 2.5; .5 MG/3ML; MG/3ML
3 SOLUTION RESPIRATORY (INHALATION) ONCE
Status: COMPLETED | OUTPATIENT
Start: 2020-01-03 | End: 2020-01-03

## 2020-01-03 RX ORDER — BENZONATATE 100 MG/1
100 CAPSULE ORAL 3 TIMES DAILY PRN
Qty: 30 CAPSULE | Refills: 0 | Status: SHIPPED | OUTPATIENT
Start: 2020-01-03 | End: 2020-02-02

## 2020-01-04 NOTE — ED NOTES
Pt refuses to start requested Neb until Dr. Mark Infante comes and listens to his breath sounds. Refuses the  [facility] only wants the face mask.

## 2020-01-04 NOTE — ED INITIAL ASSESSMENT (HPI)
Cough that started 2 hours ago and sts \"cant breath\" easy  Non labored resp speech rapid and easy. Recently over a cold. Lungs clear and stts has asthma and something wrong in my lungs.

## 2020-01-04 NOTE — ED PROVIDER NOTES
Patient Seen in: [facility] AND CLINICS Immediate Care In [address] [place] [address]      History   Patient presents with:  Cough/URI    Stated Complaint: SOB,cough    HPI    Pt is a 25 yo with a h/o asthma who has a 2 hour hx of cough and SOB.  Although patient is in no distre Extraocular Movements: Extraocular movements intact. Conjunctiva/sclera: Conjunctivae normal.      Pupils: Pupils are equal, round, and reactive to light. Neck:      Musculoskeletal: Normal range of motion and neck supple.    Cardiovascular:      Rat

## 2020-01-06 RX ORDER — SODIUM CHLORIDE, SODIUM LACTATE, POTASSIUM CHLORIDE, CALCIUM CHLORIDE 600; 310; 30; 20 MG/100ML; MG/100ML; MG/100ML; MG/100ML
INJECTION, SOLUTION INTRAVENOUS CONTINUOUS
Status: CANCELLED | OUTPATIENT
Start: 2020-01-06

## 2020-01-08 ENCOUNTER — TELEPHONE (OUTPATIENT)
Dept: GASTROENTEROLOGY | Facility: CLINIC | Age: 30
End: 2020-01-08

## 2020-01-08 NOTE — TELEPHONE ENCOUNTER
[facility] Gastroenterology  MD Ritu Lewis MD S. Clemens Eves, MD [address] Layton Rushing, MD Treasa Marc, MD Dixie Blazing, MD    PROCEDURE INSTRUCTIONS  [] Upper Endoscopy/Gastroscopy (EGD)     [] EUS and/or ERCP I NOT apply to you):    [] DO NOT TAKE: ACE Inhibitors and Angiotensin II Receptor Blockers (ARBs) night before and/or day of procedure:        _________________________________________________________________        _________________________________________ _____________________________________________________    ______________________________________________________________________    ______________________________________________________________________      CLEAR LIQUID DIET    A clear liquid diet consists

## 2020-01-08 NOTE — TELEPHONE ENCOUNTER
Pt asking if he can drink water in the morning prior to his procedure due to a dry throat. I advised the pt he may drink clear liquids up to 3 hours prior to his procedure time.      If he takes more than a few sips of water prior to the procedure he will r

## 2020-01-10 ENCOUNTER — TELEPHONE (OUTPATIENT)
Dept: GASTROENTEROLOGY | Facility: CLINIC | Age: 30
End: 2020-01-10

## 2020-01-10 ENCOUNTER — HOSPITAL ENCOUNTER (OUTPATIENT)
Age: 30
Discharge: HOME | End: 2020-01-10
Attending: EMERGENCY MEDICINE
Payer: MEDICAID

## 2020-01-10 ENCOUNTER — ANESTHESIA (OUTPATIENT)
Dept: ENDOSCOPY | Facility: HOSPITAL | Age: 30
End: 2020-01-10
Payer: MEDICAID

## 2020-01-10 ENCOUNTER — HOSPITAL ENCOUNTER (OUTPATIENT)
Facility: HOSPITAL | Age: 30
Setting detail: HOSPITAL OUTPATIENT SURGERY
Discharge: HOME | End: 2020-01-10
Attending: INTERNAL MEDICINE | Admitting: INTERNAL MEDICINE
Payer: MEDICAID

## 2020-01-10 ENCOUNTER — ANESTHESIA EVENT (OUTPATIENT)
Dept: ENDOSCOPY | Facility: HOSPITAL | Age: 30
End: 2020-01-10
Payer: MEDICAID

## 2020-01-10 VITALS
HEART RATE: 104 BPM | DIASTOLIC BLOOD PRESSURE: 70 MMHG | SYSTOLIC BLOOD PRESSURE: 137 MMHG | TEMPERATURE: 98 F | RESPIRATION RATE: 22 BRPM | OXYGEN SATURATION: 93 %

## 2020-01-10 DIAGNOSIS — K20.90 ESOPHAGITIS: Primary | ICD-10-CM

## 2020-01-10 DIAGNOSIS — J45.901 ASTHMA EXACERBATION, MILD: Primary | ICD-10-CM

## 2020-01-10 DIAGNOSIS — K22.10 ULCER OF ESOPHAGUS WITHOUT BLEEDING: ICD-10-CM

## 2020-01-10 DIAGNOSIS — K58.0 IRRITABLE BOWEL SYNDROME WITH DIARRHEA: ICD-10-CM

## 2020-01-10 DIAGNOSIS — K21.9 GASTROESOPHAGEAL REFLUX DISEASE, ESOPHAGITIS PRESENCE NOT SPECIFIED: ICD-10-CM

## 2020-01-10 DIAGNOSIS — R11.2 NAUSEA AND VOMITING, INTRACTABILITY OF VOMITING NOT SPECIFIED, UNSPECIFIED VOMITING TYPE: ICD-10-CM

## 2020-01-10 PROCEDURE — 0DB38ZX EXCISION OF LOWER ESOPHAGUS, VIA NATURAL OR ARTIFICIAL OPENING ENDOSCOPIC, DIAGNOSTIC: ICD-10-PCS | Performed by: INTERNAL MEDICINE

## 2020-01-10 PROCEDURE — 99214 OFFICE O/P EST MOD 30 MIN: CPT

## 2020-01-10 PROCEDURE — 43239 EGD BIOPSY SINGLE/MULTIPLE: CPT | Performed by: INTERNAL MEDICINE

## 2020-01-10 PROCEDURE — 0DB68ZX EXCISION OF STOMACH, VIA NATURAL OR ARTIFICIAL OPENING ENDOSCOPIC, DIAGNOSTIC: ICD-10-PCS | Performed by: INTERNAL MEDICINE

## 2020-01-10 PROCEDURE — 0DB98ZX EXCISION OF DUODENUM, VIA NATURAL OR ARTIFICIAL OPENING ENDOSCOPIC, DIAGNOSTIC: ICD-10-PCS | Performed by: INTERNAL MEDICINE

## 2020-01-10 PROCEDURE — 94640 AIRWAY INHALATION TREATMENT: CPT

## 2020-01-10 RX ORDER — OMEPRAZOLE 40 MG/1
40 CAPSULE, DELAYED RELEASE ORAL
Qty: 180 CAPSULE | Refills: 0 | Status: SHIPPED | OUTPATIENT
Start: 2020-01-10 | End: 2020-04-13

## 2020-01-10 RX ORDER — SODIUM CHLORIDE, SODIUM LACTATE, POTASSIUM CHLORIDE, CALCIUM CHLORIDE 600; 310; 30; 20 MG/100ML; MG/100ML; MG/100ML; MG/100ML
INJECTION, SOLUTION INTRAVENOUS CONTINUOUS PRN
Status: DISCONTINUED | OUTPATIENT
Start: 2020-01-10 | End: 2020-01-10 | Stop reason: SURG

## 2020-01-10 RX ORDER — GLYCOPYRROLATE 0.2 MG/ML
INJECTION INTRAMUSCULAR; INTRAVENOUS AS NEEDED
Status: DISCONTINUED | OUTPATIENT
Start: 2020-01-10 | End: 2020-01-10 | Stop reason: SURG

## 2020-01-10 RX ORDER — IPRATROPIUM BROMIDE AND ALBUTEROL SULFATE 2.5; .5 MG/3ML; MG/3ML
3 SOLUTION RESPIRATORY (INHALATION) ONCE
Status: COMPLETED | OUTPATIENT
Start: 2020-01-10 | End: 2020-01-10

## 2020-01-10 RX ORDER — PREDNISONE 1 MG/1
5 TABLET ORAL DAILY
Qty: 5 TABLET | Refills: 0 | Status: SHIPPED | OUTPATIENT
Start: 2020-01-10 | End: 2020-01-15

## 2020-01-10 RX ORDER — NALOXONE HYDROCHLORIDE 0.4 MG/ML
80 INJECTION, SOLUTION INTRAMUSCULAR; INTRAVENOUS; SUBCUTANEOUS AS NEEDED
Status: CANCELLED | OUTPATIENT
Start: 2020-01-10 | End: 2020-01-10

## 2020-01-10 RX ORDER — MIDAZOLAM HYDROCHLORIDE 1 MG/ML
INJECTION INTRAMUSCULAR; INTRAVENOUS AS NEEDED
Status: DISCONTINUED | OUTPATIENT
Start: 2020-01-10 | End: 2020-01-10 | Stop reason: SURG

## 2020-01-10 RX ORDER — LIDOCAINE HYDROCHLORIDE 10 MG/ML
INJECTION, SOLUTION EPIDURAL; INFILTRATION; INTRACAUDAL; PERINEURAL AS NEEDED
Status: DISCONTINUED | OUTPATIENT
Start: 2020-01-10 | End: 2020-01-10 | Stop reason: SURG

## 2020-01-10 RX ORDER — SODIUM CHLORIDE, SODIUM LACTATE, POTASSIUM CHLORIDE, CALCIUM CHLORIDE 600; 310; 30; 20 MG/100ML; MG/100ML; MG/100ML; MG/100ML
INJECTION, SOLUTION INTRAVENOUS CONTINUOUS
Status: CANCELLED | OUTPATIENT
Start: 2020-01-10

## 2020-01-10 RX ADMIN — LIDOCAINE HYDROCHLORIDE 50 MG: 10 INJECTION, SOLUTION EPIDURAL; INFILTRATION; INTRACAUDAL; PERINEURAL at 08:26:00

## 2020-01-10 RX ADMIN — MIDAZOLAM HYDROCHLORIDE 2 MG: 1 INJECTION INTRAMUSCULAR; INTRAVENOUS at 08:26:00

## 2020-01-10 RX ADMIN — SODIUM CHLORIDE, SODIUM LACTATE, POTASSIUM CHLORIDE, CALCIUM CHLORIDE: 600; 310; 30; 20 INJECTION, SOLUTION INTRAVENOUS at 08:36:00

## 2020-01-10 RX ADMIN — SODIUM CHLORIDE, SODIUM LACTATE, POTASSIUM CHLORIDE, CALCIUM CHLORIDE: 600; 310; 30; 20 INJECTION, SOLUTION INTRAVENOUS at 08:26:00

## 2020-01-10 RX ADMIN — GLYCOPYRROLATE 0.2 MG: 0.2 INJECTION INTRAMUSCULAR; INTRAVENOUS at 08:25:00

## 2020-01-10 NOTE — H&P
Pre Procedure History & Physical Examination    Patient Name: Boy Weinstein  MRN: [id number]  CSN: [id number]  YOB: 1990    Diagnosis: nausea/vomiting and reflux    Simethicone (GAS-X OR), , Disp: , Rfl: , 1/9/2020 at 0900  benzonatate 100 M 0900  Fluticasone Propionate 50 MCG/ACT Nasal Suspension, 2 sprays by Nasal route daily. , Disp: 16 g, Rfl: 0, Taking  benzonatate 100 MG Oral Cap, Take 1 capsule (100 mg total) by mouth 3 (three) times daily as needed for cough.  (Patient not taking: Report disease) ([facility]    • Essential hypertension    • High blood pressure    • IBS (irritable bowel syndrome)    • Irritable bowel syndrome with diarrhea    • Pneumonia due to organism      Past Surgical History:   Procedure Laterality Date   • TONSILLECTOMY extremities normal    I have discussed the risks and benefits and alternatives of the procedure with the patient/family. They understand and agree to proceed with plan of care.    I have reviewed recent H&P/clinic notes  Sonia Farias MD  [facility], [facility] -

## 2020-01-10 NOTE — OPERATIVE REPORT
ESOPHAGOGASTRODUODENOSCOPY (EGD) REPORT    Jeremy LOMAX 1990 Age 26year old   PCP Irene Jeff DO Endoscopist Kaylie Bergman MD       Date of procedure: 01/10/20    Procedure: EGD w/biopsies    Pre-operative diagnosis: reflux and n/v    Po esophagitis    Recommend:  1. BID PPI  2. The pathophysiology of acid reflux was discussed. Discussed patient symptoms and triggers.  Reviewed anti-reflux measures such as raising the head of the bed at night, avoiding tight clothing or belts, avoiding eati

## 2020-01-10 NOTE — ANESTHESIA POSTPROCEDURE EVALUATION
Patient: Nirali Omalley    Procedure Summary     Date:  01/10/20 Room / Location:  [facility] ENDOSCOPY 04 / [facility] ENDOSCOPY    Anesthesia Start:  5789 Anesthesia Stop:      Procedure:  ESOPHAGOGASTRODUODENOSCOPY (EGD) (N/A ) Diagnosis:       Gastroesophageal reflu

## 2020-01-10 NOTE — TELEPHONE ENCOUNTER
Scheduled for:  EGD 86778  Provider Name: Dr. Yonis Thrasher  Date:  3/26/20  Location:  [facility]  Sedation:  MAC  Time:   1475 (pt is aware to arrive at [address] [place] [address])   Prep:  NPO after midnight, sent via [employer]  Meds/Allergies Reconciled?:  Physician reviewed   Diagnosis wit

## 2020-01-10 NOTE — TELEPHONE ENCOUNTER
Patient returned call. Please call - Unable to reach [address] [place] [address]. Thank you.

## 2020-01-10 NOTE — TELEPHONE ENCOUNTER
Repeat EGD in 8 weeks for esophagitis healing and biopsies    Schedule EGD with MAC  [Diagnosis: esophagitis] at [facility]    Continue all medication for procedure except:     ** If MAC @ [facility]:    - HOLD ACE/ARBs the night before and the day of the

## 2020-01-10 NOTE — TELEPHONE ENCOUNTER
Op report from today states BID PPI    I called and spoke to [place] @ [facility] and gave her the clarification   Take 1 capsule (40 mg total) by mouth 2 (two) times daily before meals.     CHRISTINA to AL

## 2020-01-10 NOTE — TELEPHONE ENCOUNTER
Pharmacy called to clarify Rx:       Current Outpatient Medications:   •  Omeprazole 40 MG Oral Capsule Delayed Release, Take 1 capsule (40 mg total) by mouth 2 (two) times daily before meals.  Take 1 capsule by mouth daily before breakfast., Disp: 180 caps

## 2020-01-10 NOTE — ANESTHESIA PREPROCEDURE EVALUATION
Anesthesia PreOp Note    HPI:     Prince Nielsen is a 26year old male who presents for preoperative consultation requested by: Gualberto Hill MD    Date of Surgery: 1/10/2020    Procedure(s):  ESOPHAGOGASTRODUODENOSCOPY (EGD)  Indication: Gastroesophageal Oral Tab, TAKE 1 TABLET BY MOUTH ONCE DAILY FOR 5 DAYS, Disp: , Rfl: 0, Not Taking  Metoclopramide HCl 10 MG Oral Tab, Take 1 tablet (10 mg total) by mouth every 6 (six) hours as needed. , Disp: 90 tablet, Rfl: 3, Taking  ipratropium-albuterol 0.5-2.5 (3) M lungs 2 (two) times daily. , Disp: 1 each, Rfl: 5, Not Taking  Montelukast Sodium (SINGULAIR) 10 MG Oral Tab, Take 1 tablet (10 mg total) by mouth nightly.  (Patient not taking: Reported on 1/3/2020 ), Disp: 30 tablet, Rfl: 5, Not Taking  Metoclopramide HCl Minutes per session: Not on file      Stress: Not on file    Relationships      Social connections:        Talks on phone: Not on file        Gets together: Not on file        Attends [employer] service: Not on file        Active member of club or organi 2  Plan:   MAC  Informed Consent Plan and Risks Discussed With:  Patient  Discussed plan with:  Surgeon      I have informed Hamp Crigler and/or legal guardian or family member of the nature of the anesthetic plan, benefits, risks including possible dent

## 2020-01-11 VITALS
OXYGEN SATURATION: 94 % | HEART RATE: 77 BPM | WEIGHT: 315 LBS | HEIGHT: 71 IN | DIASTOLIC BLOOD PRESSURE: 97 MMHG | BODY MASS INDEX: 44.1 KG/M2 | RESPIRATION RATE: 18 BRPM | SYSTOLIC BLOOD PRESSURE: 133 MMHG

## 2020-01-11 NOTE — ED PROVIDER NOTES
Patient Seen in: [facility]mediate Care In [address] [place] [address]      History   Patient presents with:  Breathing Problem    Stated Complaint: shortness of breath     HPI    The patient is a 28-year-old male with a history of asthma who presents with chest Current:/70   Pulse 104   Temp 98.4 °F (36.9 °C) (Temporal)   Resp 22   SpO2 93%         Physical Exam  Vitals signs and nursing note reviewed. Constitutional:       General: He is not in acute distress. Appearance: Normal appearance.  He possibly triggered by GERD. Patient to follow-up with his doctor will give course of prednisone but patient says he can only take 5 mg daily due to side effects. He understands to go to emergency department for any further difficulty breathing.   Vital si

## 2020-01-22 RX ORDER — CETIRIZINE HYDROCHLORIDE 10 MG/1
TABLET, FILM COATED ORAL
Qty: 30 TABLET | Refills: 1 | Status: SHIPPED | OUTPATIENT
Start: 2020-01-22 | End: 2020-03-19

## 2020-01-23 ENCOUNTER — HOSPITAL ENCOUNTER (OUTPATIENT)
Age: 30
Discharge: HOME | End: 2020-01-23
Attending: EMERGENCY MEDICINE
Payer: MEDICAID

## 2020-01-23 VITALS
RESPIRATION RATE: 19 BRPM | SYSTOLIC BLOOD PRESSURE: 127 MMHG | TEMPERATURE: 98 F | DIASTOLIC BLOOD PRESSURE: 79 MMHG | HEART RATE: 88 BPM | OXYGEN SATURATION: 99 %

## 2020-01-23 DIAGNOSIS — S39.012A BACK STRAIN, INITIAL ENCOUNTER: Primary | ICD-10-CM

## 2020-01-23 PROCEDURE — 99213 OFFICE O/P EST LOW 20 MIN: CPT

## 2020-01-23 PROCEDURE — 99212 OFFICE O/P EST SF 10 MIN: CPT

## 2020-01-23 RX ORDER — IBUPROFEN 600 MG/1
600 TABLET ORAL EVERY 6 HOURS PRN
Qty: 30 TABLET | Refills: 0 | Status: SHIPPED | OUTPATIENT
Start: 2020-01-23 | End: 2020-01-30

## 2020-01-23 NOTE — ED NOTES
shown where to apply salon pas, motrin for pain stts is not happy with diagnosis and care will go to another IC for second opinion.  Encouraged to f/u with Shannan Hines in office call make appointment

## 2020-01-24 ENCOUNTER — OFFICE VISIT (OUTPATIENT)
Dept: PULMONOLOGY | Facility: CLINIC | Age: 30
End: 2020-01-24
Payer: MEDICAID

## 2020-01-24 VITALS
RESPIRATION RATE: 18 BRPM | HEIGHT: 71 IN | DIASTOLIC BLOOD PRESSURE: 83 MMHG | OXYGEN SATURATION: 95 % | HEART RATE: 86 BPM | WEIGHT: 315 LBS | BODY MASS INDEX: 44.1 KG/M2 | SYSTOLIC BLOOD PRESSURE: 128 MMHG

## 2020-01-24 DIAGNOSIS — J45.20 MILD INTERMITTENT ASTHMA WITHOUT COMPLICATION: Primary | ICD-10-CM

## 2020-01-24 PROCEDURE — 99213 OFFICE O/P EST LOW 20 MIN: CPT | Performed by: INTERNAL MEDICINE

## 2020-01-24 NOTE — PROGRESS NOTES
Referring Physician  Mary Tapia DO    History of Present Illness  Patient seen today for follow-up visit to pulmonary clinic. States asthma symptoms improved after starting Brio maintenance inhaler therapy. Denies significant wheezing, cough.   Admi Fluticasone Furoate-Vilanterol (BREO ELLIPTA) 200-25 MCG/INH Inhalation Aerosol Powder, Breath Activated, Inhale 1 puff into the lungs daily. , Disp: 1 each, Rfl: 5  Montelukast Sodium (SINGULAIR) 10 MG Oral Tab, Take 1 tablet (10 mg total) by mouth night Jose Morocho DO  Pulmonary Medicine  [facility]  1/24/2020  11:56 AM

## 2020-01-27 ENCOUNTER — OFFICE VISIT (OUTPATIENT)
Dept: FAMILY MEDICINE CLINIC | Facility: CLINIC | Age: 30
End: 2020-01-27
Payer: MEDICAID

## 2020-01-27 VITALS
TEMPERATURE: 98 F | HEART RATE: 90 BPM | WEIGHT: 315 LBS | HEIGHT: 71 IN | SYSTOLIC BLOOD PRESSURE: 139 MMHG | BODY MASS INDEX: 44.1 KG/M2 | DIASTOLIC BLOOD PRESSURE: 85 MMHG

## 2020-01-27 DIAGNOSIS — J06.9 ACUTE URI: Primary | ICD-10-CM

## 2020-01-27 PROCEDURE — 99213 OFFICE O/P EST LOW 20 MIN: CPT | Performed by: FAMILY MEDICINE

## 2020-01-27 RX ORDER — AZITHROMYCIN 250 MG/1
TABLET, FILM COATED ORAL
Qty: 6 TABLET | Refills: 0 | Status: SHIPPED | OUTPATIENT
Start: 2020-01-27 | End: 2020-02-06

## 2020-01-27 NOTE — PROGRESS NOTES
HPI:    Patient ID: Alena Ely is a 26year old male.     HPI  Patient presents with:  Cold: bodyaches, nausea, congestion   Fever: of upto 100.5, loss of appetite  Cough: was yellow 1 time, now clear     Review of Systems   Constitutional: Positive for • LORazepam 0.5 MG Oral Tab as needed.     0   • ergocalciferol 81051 units Oral Cap once a week.    0   • Fluticasone Propionate 50 MCG/ACT Nasal Suspension USE 2 SPRAY(S) IN EACH NOSTRIL ONCE DAILY FOR 30 DAYS 1 Inhaler 5     Allergies:  Haldol [Haloper

## 2020-02-03 NOTE — ED PROVIDER NOTES
Patient Seen in: [facility] Immediate Care In [place]      History   Patient presents with:  Upper Extremity Injury    Stated Complaint: Shoulder/Back Pain    HPI    27 yo male with right shoulder pain. No specific trauma.  Pain is worse with rang distress. Musculoskeletal:      Comments: Right posterior shoulder and trapezius  tenderness and pain with range of motion. Distal neurovascular intact. Skin:     General: Skin is warm and dry.       Capillary Refill: Capillary refill takes less than 2

## 2020-02-05 ENCOUNTER — TELEPHONE (OUTPATIENT)
Dept: PULMONOLOGY | Facility: CLINIC | Age: 30
End: 2020-02-05

## 2020-02-05 NOTE — TELEPHONE ENCOUNTER
Pt asking for results of his PFT test to be faxed to [facility] - vocational officer for [facility] program - [phone]

## 2020-02-05 NOTE — TELEPHONE ENCOUNTER
PFT results faxed to [facility] as requested by pt at [phone]. Confirmation sheet received. No further action required at this time.

## 2020-02-06 ENCOUNTER — APPOINTMENT (OUTPATIENT)
Dept: GENERAL RADIOLOGY | Age: 30
End: 2020-02-06
Attending: EMERGENCY MEDICINE
Payer: MEDICAID

## 2020-02-06 ENCOUNTER — HOSPITAL ENCOUNTER (OUTPATIENT)
Age: 30
Discharge: HOME | End: 2020-02-06
Attending: EMERGENCY MEDICINE
Payer: MEDICAID

## 2020-02-06 VITALS
DIASTOLIC BLOOD PRESSURE: 78 MMHG | SYSTOLIC BLOOD PRESSURE: 125 MMHG | HEART RATE: 83 BPM | BODY MASS INDEX: 50 KG/M2 | OXYGEN SATURATION: 97 % | RESPIRATION RATE: 20 BRPM | TEMPERATURE: 97 F | WEIGHT: 315 LBS

## 2020-02-06 DIAGNOSIS — J06.9 VIRAL UPPER RESPIRATORY TRACT INFECTION WITH COUGH: Primary | ICD-10-CM

## 2020-02-06 PROCEDURE — 71046 X-RAY EXAM CHEST 2 VIEWS: CPT | Performed by: EMERGENCY MEDICINE

## 2020-02-06 PROCEDURE — 99213 OFFICE O/P EST LOW 20 MIN: CPT

## 2020-02-06 NOTE — ED NOTES
sts tessalon not covered. otc products discussed with md. F/u in office next week. Fluids wash hands cover mouth when coughing.

## 2020-02-06 NOTE — ED PROVIDER NOTES
Patient Seen in: [facility] Immediate Care In [address] [place] [address]      History   Patient presents with:  Cough/URI    Stated Complaint: cough    HPI  Patient has had a cough for the last week.   He was recently treated with a azithromycin for a upper respirat 97 %   O2 Device None (Room air)       Current:/78   Pulse 83   Temp 96.7 °F (35.9 °C) (Temporal)   Resp 20   Wt (!) 163.3 kg   SpO2 97%   BMI 50.21 kg/m²         Physical Exam  Vitals signs and nursing note reviewed.    Constitutional:       General: Robitussin was advised if he would like to try cough medicine.       Disposition and Plan     Clinical Impression:  Viral upper respiratory tract infection with cough  (primary encounter diagnosis)    Disposition:  Discharge  2/6/2020 12:12 pm    Follow-up:

## 2020-02-06 NOTE — ED INITIAL ASSESSMENT (HPI)
Was in a class and started coughing. Very intense with health and here freq. stts he is having general aches and pains all over. Doesn't like wearing amask. + flu shot easy non labored resp speech clear.  Recently off EES for uri

## 2020-03-08 NOTE — TELEPHONE ENCOUNTER
Review pended refill request as it does not fall under a protocol.     Last Rx: 12/2019 #16g#0 with directions to use for 30 days  LOV: 1 month ago

## 2020-03-10 RX ORDER — FLUTICASONE PROPIONATE 50 MCG
SPRAY, SUSPENSION (ML) NASAL
Qty: 16 G | Refills: 0 | Status: SHIPPED | OUTPATIENT
Start: 2020-03-10 | End: 2020-05-17

## 2020-03-10 NOTE — TELEPHONE ENCOUNTER
Refill noted. Chart reveiwed. Okay to fill times one with no additional refills. Refilled on behalf of Dr. Cheng Head.

## 2020-03-19 ENCOUNTER — TELEPHONE (OUTPATIENT)
Dept: GASTROENTEROLOGY | Facility: CLINIC | Age: 30
End: 2020-03-19

## 2020-03-19 DIAGNOSIS — K20.90 ESOPHAGITIS: Primary | ICD-10-CM

## 2020-03-19 NOTE — TELEPHONE ENCOUNTER
Scheduled for:  EGD 72625  Provider Name: Dr. Ashok Mccabe  Date:  07/02/2020  Location:  [facility]  Sedation:  MAC  Time:   4596VO (pt is aware to arrive at 0630)    Prep:  NPO after midnight, sent via [employer]  Meds/Allergies Reconciled?:  Physician reviewed   Diagnos

## 2020-03-20 RX ORDER — CETIRIZINE HYDROCHLORIDE 10 MG/1
10 TABLET ORAL DAILY
Qty: 30 TABLET | Refills: 1 | Status: SHIPPED | OUTPATIENT
Start: 2020-03-20 | End: 2020-05-17

## 2020-03-20 NOTE — TELEPHONE ENCOUNTER
Refill noted. Chart reviewed. Okay to fill times one for 30 day supply with one additional refill. Refilled on behalf of Dr. Johann Rivera.

## 2020-03-31 RX ORDER — VALSARTAN 80 MG/1
80 TABLET ORAL
Qty: 90 TABLET | Refills: 0 | Status: SHIPPED | OUTPATIENT
Start: 2020-03-31 | End: 2020-07-06

## 2020-03-31 NOTE — TELEPHONE ENCOUNTER
Refill noted. Chart reviewed. Okay to fill times one for 90 day supply with no additional refills. Patient needs to follow-up after this fill to get more. Notify pt. Refilled on behalf of Dr. Miguel Lomeli.

## 2020-03-31 NOTE — TELEPHONE ENCOUNTER
Call was placed to patient, informed him that refill was available for  at pharmacy - patient stated understanding

## 2020-04-02 ENCOUNTER — E-VISIT (OUTPATIENT)
Dept: FAMILY MEDICINE CLINIC | Facility: CLINIC | Age: 30
End: 2020-04-02

## 2020-04-02 DIAGNOSIS — Z20.822 SUSPECTED 2019 NOVEL CORONAVIRUS INFECTION: Primary | ICD-10-CM

## 2020-04-02 DIAGNOSIS — R05.9 COUGH: ICD-10-CM

## 2020-04-02 DIAGNOSIS — R06.02 SHORTNESS OF BREATH: ICD-10-CM

## 2020-04-02 NOTE — PROGRESS NOTES
Radha Carroll is a 26year old male. HPI:   See answers to questions above. Spoke with patient   Cough for 2 days and worsening   +chest heaviness and shortness of breath. Hx of asthma and using inhaler twice per day with mild improvement.  Does not feel Past Medical History:   Diagnosis Date   • ADHD (attention deficit hyperactivity disorder)    • Asthma    • Attention deficit hyperactivity disorder (ADHD)    • Bipolar 2 disorder (HCC)    • COPD (chronic obstructive pulmonary disease) ([facility] [address]    • Jono

## 2020-04-09 ENCOUNTER — TELEPHONE (OUTPATIENT)
Dept: OTHER | Age: 30
End: 2020-04-09

## 2020-04-09 NOTE — TELEPHONE ENCOUNTER
Called spoke with pt in regards to test results from [facility], dr states results were negative pt states verbal understanding

## 2020-04-09 NOTE — TELEPHONE ENCOUNTER
Zan Tsang from [facility] stated the patient had a Covid test done which came back Negative. Zan Tsang stated he notified the patient already.

## 2020-04-11 ENCOUNTER — TELEPHONE (OUTPATIENT)
Dept: GASTROENTEROLOGY | Facility: CLINIC | Age: 30
End: 2020-04-11

## 2020-04-13 ENCOUNTER — TELEPHONE (OUTPATIENT)
Dept: GASTROENTEROLOGY | Facility: CLINIC | Age: 30
End: 2020-04-13

## 2020-04-13 RX ORDER — OMEPRAZOLE 40 MG/1
40 CAPSULE, DELAYED RELEASE ORAL
Qty: 180 CAPSULE | Refills: 0 | Status: SHIPPED | OUTPATIENT
Start: 2020-04-13 | End: 2020-07-13

## 2020-04-13 NOTE — TELEPHONE ENCOUNTER
Dr Sheldon Read, please confirm that you want omeprazole rx BID? There are 2 sigs in the prescription. Thanks.

## 2020-04-13 NOTE — TELEPHONE ENCOUNTER
Dr. Su Zuniga-    Omeprazole RX was sent to the pharmacy with two [address]. Please clarify the SIG for this RX and will inform the pharmacy. Is the patient to be on it as BID dosing per your last EGD operative report from 1/10/2020? Or take once daily now?     Ple

## 2020-04-13 NOTE — TELEPHONE ENCOUNTER
Spoke w/ Christian Avalos at [employer] ([phone]) and reviewed Dr. Jose Miguel Mackenzie clarification message below for SIG, she verbalized understanding of all and did not have further questions at this time.

## 2020-04-13 NOTE — TELEPHONE ENCOUNTER
There is another telephone encounter with same question. I think Praneeth Fountain is working on it. \"Twice a day.  Patient with severe symptoms and severe esophagitis on exam.      Thank you\"

## 2020-04-13 NOTE — TELEPHONE ENCOUNTER
Requested Prescriptions     Pending Prescriptions Disp Refills   • Omeprazole 40 MG Oral Capsule Delayed Release 180 capsule 0     Sig: Take 1 capsule (40 mg total) by mouth 2 (two) times daily before meals.  Take 1 capsule by mouth daily before breakfast.

## 2020-05-18 ENCOUNTER — TELEPHONE (OUTPATIENT)
Dept: GASTROENTEROLOGY | Facility: CLINIC | Age: 30
End: 2020-05-18

## 2020-05-18 RX ORDER — FLUTICASONE PROPIONATE 50 MCG
SPRAY, SUSPENSION (ML) NASAL
Qty: 16 G | Refills: 0 | Status: SHIPPED | OUTPATIENT
Start: 2020-05-18 | End: 2020-07-20

## 2020-05-18 RX ORDER — CETIRIZINE HYDROCHLORIDE 10 MG/1
10 TABLET ORAL DAILY
Qty: 30 TABLET | Refills: 1 | Status: SHIPPED | OUTPATIENT
Start: 2020-05-18 | End: 2020-07-20

## 2020-05-18 NOTE — TELEPHONE ENCOUNTER
Contacted pt to offer them an earlier procedure date of 5/21 or 5/22. [facility]

## 2020-05-18 NOTE — TELEPHONE ENCOUNTER
Refills noted. Chart reviewed. Okay to fill times one for 30 day supply with no additional refills for nasal spray and one for the cetrizine. Refilled on behalf of Neptali JOHNS.

## 2020-05-20 ENCOUNTER — TELEPHONE (OUTPATIENT)
Dept: GASTROENTEROLOGY | Facility: CLINIC | Age: 30
End: 2020-05-20

## 2020-05-26 ENCOUNTER — TELEPHONE (OUTPATIENT)
Dept: GASTROENTEROLOGY | Facility: CLINIC | Age: 30
End: 2020-05-26

## 2020-06-04 ENCOUNTER — OFFICE VISIT (OUTPATIENT)
Dept: PULMONOLOGY | Facility: CLINIC | Age: 30
End: 2020-06-04
Payer: MEDICAID

## 2020-06-04 ENCOUNTER — TELEPHONE (OUTPATIENT)
Dept: PULMONOLOGY | Facility: CLINIC | Age: 30
End: 2020-06-04

## 2020-06-04 VITALS
RESPIRATION RATE: 18 BRPM | HEIGHT: 71 IN | HEART RATE: 92 BPM | BODY MASS INDEX: 44.1 KG/M2 | SYSTOLIC BLOOD PRESSURE: 140 MMHG | OXYGEN SATURATION: 92 % | DIASTOLIC BLOOD PRESSURE: 80 MMHG | WEIGHT: 315 LBS

## 2020-06-04 DIAGNOSIS — J45.20 MILD INTERMITTENT ASTHMA WITHOUT COMPLICATION: Primary | ICD-10-CM

## 2020-06-04 PROCEDURE — 99213 OFFICE O/P EST LOW 20 MIN: CPT | Performed by: INTERNAL MEDICINE

## 2020-06-04 NOTE — PROGRESS NOTES
Referring Physician  Loulou De Oliveira DO    History of Present Illness  Patient seen today for follow-up visit to pulmonary clinic. States asthma symptoms improved after starting Breo maintenance inhaler therapy. Denies significant wheezing, cough.   Has , Disp: , Rfl: 0    No current facility-administered medications on file prior to visit.        Allergies    Haldol [Haloperidol]    OTHER (SEE COMMENTS)    Comment:Cannot sleep, psychiatric problem  Mucinex Dm Maximum *    OTHER (SEE COMMENTS)    Comment:

## 2020-06-04 NOTE — TELEPHONE ENCOUNTER
Pharmacy states medication is not available and they are unable to order medication. Pharmacy states higher dosage of medication is available 2. 5MCG        Current Outpatient Medications:     •  Tiotropium Bromide Monohydrate (SPIRIVA RESPIMAT) 1.25 MCG/AC

## 2020-06-05 ENCOUNTER — TELEPHONE (OUTPATIENT)
Dept: PULMONOLOGY | Facility: CLINIC | Age: 30
End: 2020-06-05

## 2020-06-05 NOTE — TELEPHONE ENCOUNTER
Higher dose which is a 2.5 MCG is okay to be taken from my perspective. Same directions as previous dose is okay.

## 2020-06-05 NOTE — TELEPHONE ENCOUNTER
Per Barb Tiotropium Bromide Monohydrate (Spiriva Respimat) 2.5 mcg is not covered by pt's insurance, but Spiriva Respimat 1.25 mcg is covered, now avail, & is being dispensed. Dr. Camille Vargas. Pls sign rx if agreeable.

## 2020-06-05 NOTE — TELEPHONE ENCOUNTER
Spiriva 2.5 mcg order placed. Spoke to Dr. Carey Jason regarding Spiriva 1.25 mcg script, per Dr. Carey Jason okay to discontinue.

## 2020-06-05 NOTE — TELEPHONE ENCOUNTER
[facility]/pharmacy calling to rx:Tiotropium Bromide, is not covered by insurance, asking if another script in place of rx, please call [phone], thanks.

## 2020-06-08 NOTE — TELEPHONE ENCOUNTER
[facility]/pharmacy calling to confirm dosage for rx:Tiotropium indicates she be 2 puffs, please call at:[phone],thanks.     Current Outpatient Medications:   •  Tiotropium Bromide Monohydrate 2.5 MCG/ACT Inhalation Aero Soln, Inhale 2.5 mcg into the lung

## 2020-06-09 NOTE — TELEPHONE ENCOUNTER
Per Colletta @ [address] Spiriva Respimat 1.25 mcg should be inhale 2 puffs into the lungs daily. Bethany stts Spiriva Respimat 2.5 mcg is ordered as inhale 1 puff into the lungs daily. Vicky Bueno is aware that RN will d/w Dr. Carey Jason.     Please see RN's

## 2020-06-09 NOTE — TELEPHONE ENCOUNTER
Dr. Freya Coreas, please see message below and confirm if patient is to be on 2 puffs daily or 1 puff daily of Tiotropium Bromide Monohydrate. Thank you. Tacos Thorne

## 2020-06-22 ENCOUNTER — APPOINTMENT (OUTPATIENT)
Dept: GENERAL RADIOLOGY | Facility: HOSPITAL | Age: 30
End: 2020-06-22
Attending: NURSE PRACTITIONER
Payer: MEDICAID

## 2020-06-22 ENCOUNTER — HOSPITAL ENCOUNTER (EMERGENCY)
Facility: HOSPITAL | Age: 30
Discharge: HOME | End: 2020-06-22
Payer: MEDICAID

## 2020-06-22 VITALS
WEIGHT: 315 LBS | HEART RATE: 71 BPM | RESPIRATION RATE: 18 BRPM | DIASTOLIC BLOOD PRESSURE: 74 MMHG | OXYGEN SATURATION: 97 % | HEIGHT: 70 IN | BODY MASS INDEX: 45.1 KG/M2 | TEMPERATURE: 98 F | SYSTOLIC BLOOD PRESSURE: 122 MMHG

## 2020-06-22 DIAGNOSIS — G47.00 INSOMNIA, UNSPECIFIED TYPE: Primary | ICD-10-CM

## 2020-06-22 DIAGNOSIS — R53.83 FATIGUE, UNSPECIFIED TYPE: ICD-10-CM

## 2020-06-22 DIAGNOSIS — R60.9 EDEMA, UNSPECIFIED TYPE: ICD-10-CM

## 2020-06-22 PROCEDURE — 83880 ASSAY OF NATRIURETIC PEPTIDE: CPT | Performed by: NURSE PRACTITIONER

## 2020-06-22 PROCEDURE — 80048 BASIC METABOLIC PNL TOTAL CA: CPT | Performed by: NURSE PRACTITIONER

## 2020-06-22 PROCEDURE — 80076 HEPATIC FUNCTION PANEL: CPT | Performed by: NURSE PRACTITIONER

## 2020-06-22 PROCEDURE — 93005 ELECTROCARDIOGRAM TRACING: CPT

## 2020-06-22 PROCEDURE — 93010 ELECTROCARDIOGRAM REPORT: CPT | Performed by: EMERGENCY MEDICINE

## 2020-06-22 PROCEDURE — 85025 COMPLETE CBC W/AUTO DIFF WBC: CPT | Performed by: NURSE PRACTITIONER

## 2020-06-22 PROCEDURE — 82962 GLUCOSE BLOOD TEST: CPT

## 2020-06-22 PROCEDURE — 36415 COLL VENOUS BLD VENIPUNCTURE: CPT

## 2020-06-22 PROCEDURE — 81003 URINALYSIS AUTO W/O SCOPE: CPT

## 2020-06-22 PROCEDURE — 71045 X-RAY EXAM CHEST 1 VIEW: CPT | Performed by: NURSE PRACTITIONER

## 2020-06-22 PROCEDURE — 84484 ASSAY OF TROPONIN QUANT: CPT | Performed by: NURSE PRACTITIONER

## 2020-06-22 PROCEDURE — 99284 EMERGENCY DEPT VISIT MOD MDM: CPT

## 2020-06-22 PROCEDURE — 85379 FIBRIN DEGRADATION QUANT: CPT | Performed by: NURSE PRACTITIONER

## 2020-06-22 RX ORDER — DIAZEPAM 5 MG/1
5 TABLET ORAL NIGHTLY PRN
Qty: 5 TABLET | Refills: 0 | Status: SHIPPED | OUTPATIENT
Start: 2020-06-22 | End: 2020-06-22

## 2020-06-22 RX ORDER — DIAZEPAM 5 MG/1
5 TABLET ORAL NIGHTLY PRN
Qty: 5 TABLET | Refills: 0 | Status: SHIPPED | OUTPATIENT
Start: 2020-06-22 | End: 2020-07-16

## 2020-06-22 NOTE — ED PROVIDER NOTES
Patient Seen in: [facility] Emergency Department      History   Patient presents with:  Fatigue    Stated Complaint:     HPI    39-year-old male presents to the emergency department by ambulance complaining of dehydration.   He does have history of Current:/83   Pulse 78   Temp 98.4 °F (36.9 °C)   Resp 18   Ht 177.8 cm (5' 10\")   Wt (!) 167.8 kg   SpO2 96%   BMI 53.09 kg/m²         Physical Exam  Vitals signs and nursing note reviewed.    Constitutional:       Appearance: Normal appearanc ---------                               -----------         ------                     CBC W/ DIFFERENTIAL[[id number]]          Abnormal            Final result                 Please view results for these tests on the individual orders.           Eric Nails soon as possible for a visit in 2 days  If symptoms worsen return to the ER          Medications Prescribed:  Current Discharge Medication List    START taking these medications    diazepam 5 MG Oral Tab  Take 1 tablet (5 mg total) by mouth nightly as need

## 2020-06-22 NOTE — ED INITIAL ASSESSMENT (HPI)
Care assumed from EMS. Pt c/o \"dehydration\" today and insomnia for the past several days.  Pt denies n/v/d.

## 2020-06-22 NOTE — ED NOTES
Pt to the ed stating that he \"feels dehydrated\". Pt states that he had multiple formed bm's today and no pain with urination. Pt states he \"just feel dehydrated\".

## 2020-06-29 ENCOUNTER — TELEPHONE (OUTPATIENT)
Dept: GASTROENTEROLOGY | Facility: CLINIC | Age: 30
End: 2020-06-29

## 2020-06-29 DIAGNOSIS — K20.90 ESOPHAGITIS: Primary | ICD-10-CM

## 2020-06-30 NOTE — TELEPHONE ENCOUNTER
Dr Arash Luna- Patient canceled procedure and did not want to reschedule at this time.   Dx: Esophagitis

## 2020-06-30 NOTE — TELEPHONE ENCOUNTER
Left message to call back and schedule. If patient calls back please forward call to GI Schedulers.      Canceled patient on epic as well as surgical case change request sent to Endo    Canceled for:  EGD 91136  Provider Name: Dr. Mu Miles  Date:  07/02/2020

## 2020-07-07 RX ORDER — VALSARTAN 80 MG/1
80 TABLET ORAL
Qty: 30 TABLET | Refills: 0 | Status: SHIPPED | OUTPATIENT
Start: 2020-07-07 | End: 2020-07-16

## 2020-07-07 NOTE — TELEPHONE ENCOUNTER
Refill noted. Chart reviewed. Okay to fill times one for 30 day supply with no additional refills. Due for office visit. Refilled on behalf of Dr. Johann Rivera.

## 2020-07-13 ENCOUNTER — HOSPITAL ENCOUNTER (OUTPATIENT)
Age: 30
Discharge: HOME | End: 2020-07-13
Attending: FAMILY MEDICINE
Payer: MEDICAID

## 2020-07-13 VITALS
OXYGEN SATURATION: 94 % | HEART RATE: 85 BPM | SYSTOLIC BLOOD PRESSURE: 155 MMHG | TEMPERATURE: 98 F | BODY MASS INDEX: 53 KG/M2 | HEIGHT: 70 IN | DIASTOLIC BLOOD PRESSURE: 77 MMHG | RESPIRATION RATE: 20 BRPM

## 2020-07-13 DIAGNOSIS — Z76.0 MEDICATION REFILL: Primary | ICD-10-CM

## 2020-07-13 PROCEDURE — 99202 OFFICE O/P NEW SF 15 MIN: CPT | Performed by: FAMILY MEDICINE

## 2020-07-13 RX ORDER — ALBUTEROL SULFATE 90 UG/1
2 AEROSOL, METERED RESPIRATORY (INHALATION) EVERY 4 HOURS PRN
Qty: 1 INHALER | Refills: 0 | Status: SHIPPED | OUTPATIENT
Start: 2020-07-13 | End: 2020-08-12

## 2020-07-13 RX ORDER — OMEPRAZOLE 40 MG/1
40 CAPSULE, DELAYED RELEASE ORAL
Qty: 180 CAPSULE | Refills: 0 | Status: SHIPPED | OUTPATIENT
Start: 2020-07-13 | End: 2020-08-03

## 2020-07-13 NOTE — ED PROVIDER NOTES
Patient Seen in: [facility] Immediate Care In [address] [place] [address]      History   Patient presents with:  Medication Administration    Stated Complaint: RX REFIL    HPI    Pt needs a refill on his albuterol inh. He ran out. Did not call his PCP.  Needs it 1-2 motion and neck supple. Cardiovascular:      Rate and Rhythm: Normal rate and regular rhythm. Pulses: Normal pulses. Heart sounds: Normal heart sounds.    Pulmonary:      Effort: Pulmonary effort is normal.      Breath sounds: Normal breath soun

## 2020-07-15 ENCOUNTER — NURSE TRIAGE (OUTPATIENT)
Dept: FAMILY MEDICINE CLINIC | Facility: CLINIC | Age: 30
End: 2020-07-15

## 2020-07-15 ENCOUNTER — TELEMEDICINE (OUTPATIENT)
Dept: FAMILY MEDICINE CLINIC | Facility: CLINIC | Age: 30
End: 2020-07-15
Payer: MEDICAID

## 2020-07-15 DIAGNOSIS — J45.21 EXACERBATION OF INTERMITTENT ASTHMA, UNSPECIFIED ASTHMA SEVERITY: Primary | ICD-10-CM

## 2020-07-15 DIAGNOSIS — J30.2 SEASONAL ALLERGIES: ICD-10-CM

## 2020-07-15 PROCEDURE — 99213 OFFICE O/P EST LOW 20 MIN: CPT | Performed by: STUDENT IN AN ORGANIZED HEALTH CARE EDUCATION/TRAINING PROGRAM

## 2020-07-15 RX ORDER — PREDNISONE 1 MG/1
5 TABLET ORAL DAILY
Qty: 5 TABLET | Refills: 0 | Status: SHIPPED | OUTPATIENT
Start: 2020-07-15 | End: 2020-07-20

## 2020-07-15 NOTE — TELEPHONE ENCOUNTER
Action Requested: Summary for Provider     []  Critical Lab, Recommendations Needed  [] Need Additional Advice  [x]   FYI    []   Need Orders  [] Need Medications Sent to Pharmacy  []  Other     SUMMARY:  For the last 3 days having shortness of breath and

## 2020-07-15 NOTE — PROGRESS NOTES
Virtual Telephone Check-In    Boy Weinstein verbally consents to a Virtual/Telephone Check-In visit on 07/15/20. Patient has been referred to the [facility] website at [email or web address] to review the yearly Consent to Treat document.     Patient Jose Roberto Strickland as detailed in the plan of care above. Coding/billing information is submitted for this visit based on complexity of care and/or time spent for the visit. HPI:    Patient ID: Nirali Omalley is a 26year old male.     HPI  Pt presenting for shortness Powder, Breath Activated Inhale 1 puff into the lungs daily.  1 each 5   • Fluticasone Propionate 50 MCG/ACT Nasal Suspension USE 2 SPRAY(S) IN EACH NOSTRIL ONCE DAILY FOR 30 DAYS 16 g 0   • cetirizine (EQ ALLERGY RELIEF, CETIRIZINE,) 10 MG Oral Tab Take 1 Pt sounded coherent and alert on the phone. Alert and oriented x 3  Patient was responding to questions appropriately. Patient did not appear short of breath. ASSESSMENT/PLAN:   1.  Exacerbation of intermittent asthma, unspecified asthma severity  P

## 2020-07-16 NOTE — PROGRESS NOTES
HPI:    Patient ID: Nirali Omalley is a 26year old male. HPI  Patient presents for referral as needed for psychiatry. He has an appointment soon to help maintain his medication for bipolar disorder.   He also has a complaint of right upper to midline a BED AND EXPETORATE. DO NOT RINSE  5   • ipratropium-albuterol 0.5-2.5 (3) MG/3ML Inhalation Solution Take 3 mL by nebulization 3 (three) times daily.  90 vial 5   • gabapentin 300 MG Oral Cap TAKE 1 CAPSULE BY MOUTH TWICE DAILY  0   • Loperamide HCl (IMODIU by mouth once daily.        Imaging & Referrals:  PSYCHIATRIC - EXTERNAL       ZK#[id number]

## 2020-07-17 ENCOUNTER — TELEPHONE (OUTPATIENT)
Dept: FAMILY MEDICINE CLINIC | Facility: CLINIC | Age: 30
End: 2020-07-17

## 2020-07-17 NOTE — TELEPHONE ENCOUNTER
Patient stated he had a video visit with Dr. Severiano Skains on 7/15 and informed to call her back to let her know how he's doing. Patient reports feeling much better with no issues.

## 2020-07-17 NOTE — ED PROVIDER NOTES
Patient Seen in: [facility] Emergency Department      History   Patient presents with:  Fatigue    Stated Complaint:     HPI      Past Medical History:   Diagnosis Date   • ADHD (attention deficit hyperactivity disorder)    • Asthma    • Attention Normal   PRO BETA NATRIURETIC PEPTIDE - Normal   TROPONIN I - Normal   POCT GLUCOSE - Normal   URINALYSIS WITH CULTURE REFLEX   CBC WITH DIFFERENTIAL WITH PLATELET    Narrative:      The following orders were created for panel order CBC WITH DIFFERENTIAL WI

## 2020-07-21 RX ORDER — FLUTICASONE PROPIONATE 50 MCG
SPRAY, SUSPENSION (ML) NASAL
Qty: 16 G | Refills: 0 | Status: SHIPPED | OUTPATIENT
Start: 2020-07-21 | End: 2020-09-17

## 2020-07-21 RX ORDER — CETIRIZINE HYDROCHLORIDE 10 MG/1
10 TABLET ORAL DAILY
Qty: 30 TABLET | Refills: 1 | Status: SHIPPED | OUTPATIENT
Start: 2020-07-21 | End: 2020-09-17

## 2020-07-31 ENCOUNTER — HOSPITAL ENCOUNTER (EMERGENCY)
Facility: HOSPITAL | Age: 30
Discharge: HOME | End: 2020-07-31
Attending: EMERGENCY MEDICINE
Payer: MEDICAID

## 2020-07-31 ENCOUNTER — APPOINTMENT (OUTPATIENT)
Dept: GENERAL RADIOLOGY | Facility: HOSPITAL | Age: 30
End: 2020-07-31
Payer: MEDICAID

## 2020-07-31 VITALS
RESPIRATION RATE: 24 BRPM | TEMPERATURE: 98 F | HEART RATE: 72 BPM | OXYGEN SATURATION: 97 % | BODY MASS INDEX: 45.1 KG/M2 | WEIGHT: 315 LBS | DIASTOLIC BLOOD PRESSURE: 68 MMHG | SYSTOLIC BLOOD PRESSURE: 119 MMHG | HEIGHT: 70 IN

## 2020-07-31 DIAGNOSIS — J18.9 PNEUMONIA OF RIGHT LOWER LOBE DUE TO INFECTIOUS ORGANISM: ICD-10-CM

## 2020-07-31 DIAGNOSIS — J45.21 MILD INTERMITTENT ASTHMA WITH ACUTE EXACERBATION: Primary | ICD-10-CM

## 2020-07-31 LAB — SARS-COV-2 RNA RESP QL NAA+PROBE: NOT DETECTED

## 2020-07-31 PROCEDURE — 99284 EMERGENCY DEPT VISIT MOD MDM: CPT

## 2020-07-31 PROCEDURE — 71045 X-RAY EXAM CHEST 1 VIEW: CPT | Performed by: EMERGENCY MEDICINE

## 2020-07-31 PROCEDURE — 93010 ELECTROCARDIOGRAM REPORT: CPT | Performed by: EMERGENCY MEDICINE

## 2020-07-31 PROCEDURE — 93005 ELECTROCARDIOGRAM TRACING: CPT

## 2020-07-31 RX ORDER — ALBUTEROL SULFATE 90 UG/1
2 AEROSOL, METERED RESPIRATORY (INHALATION) EVERY 4 HOURS PRN
Qty: 1 INHALER | Refills: 0 | Status: SHIPPED | OUTPATIENT
Start: 2020-07-31 | End: 2020-08-30

## 2020-07-31 RX ORDER — PREDNISONE 1 MG/1
5 TABLET ORAL DAILY
Qty: 5 TABLET | Refills: 0 | Status: SHIPPED | OUTPATIENT
Start: 2020-07-31 | End: 2020-08-05

## 2020-07-31 RX ORDER — AZITHROMYCIN 250 MG/1
TABLET, FILM COATED ORAL
Qty: 1 PACKAGE | Refills: 0 | Status: SHIPPED | OUTPATIENT
Start: 2020-07-31 | End: 2020-08-05

## 2020-07-31 RX ORDER — PREDNISONE 1 MG/1
5 TABLET ORAL ONCE
Status: COMPLETED | OUTPATIENT
Start: 2020-07-31 | End: 2020-07-31

## 2020-07-31 RX ORDER — CEFPODOXIME PROXETIL 200 MG/1
200 TABLET, FILM COATED ORAL 2 TIMES DAILY
Qty: 20 TABLET | Refills: 0 | Status: SHIPPED | OUTPATIENT
Start: 2020-07-31 | End: 2020-08-10

## 2020-07-31 NOTE — ED INITIAL ASSESSMENT (HPI)
Pt presents with ALETHA onset this AM. Hx of asthma - took 6 puffs of rescue inhaler with minimal relief. Denies fevers or sick contact.

## 2020-07-31 NOTE — ED PROVIDER NOTES
Patient Seen in: [facility] AND [facility] Emergency Department      History   Patient presents with:  Dyspnea ALETHA SOB    Stated Complaint: sob     HPI    26-year-old male presents for complaint of shortness of breath since yesterday.   Has had cough productive o Vitals [07/31/20 0741]   /74   Pulse 85   Resp 24   Temp 97.6 °F (36.4 °C)   Temp src Temporal   SpO2 94 %   O2 Device None (Room air)       Current:/68   Pulse 72   Temp 97.6 °F (36.4 °C) (Temporal)   Resp 24   Ht 177.8 cm (5' 10\")   Wt (!) 1 5 mg of prednisone daily. He was started on a small dose of prednisone. Return precautions given. At discharge, he is non-toxic, well hydrated, tolerating PO and in no respiratory distress.  Airway is patent and patient is tolerating secretions without d am    Follow-up:  Luis Alberto Tucker DO  [address] [facility]  [phone]    Schedule an appointment as soon as possible for a visit in 2 days  For follow up and re-evaluation          Medications Prescribed:  Current Discharge Medication List

## 2020-08-01 NOTE — ED PROVIDER NOTES
Patient Seen in: [facility] Immediate Care In [address] [place] [address]    History   Patient presents with: Anxiety    Stated Complaint: anxiety    HPI    Patient complains of anxiety.   Patient states that he gets anxiety from time to time and he was in the ER ea Tab,  Take 1 tablet (80 mg total) by mouth once daily. Omeprazole 40 MG Oral Capsule Delayed Release,  Take 1 capsule (40 mg total) by mouth 2 (two) times daily before meals.  Take 1 capsule by mouth daily before breakfast.   Albuterol Sulfate  (90 other systems reviewed and negative except as noted above. PSFH elements reviewed from today and agreed except as otherwise stated in HPI.     Physical Exam     ED Triage Vitals [07/31/20 1908]   /80   Pulse 100   Resp 20   Temp 98 °F (36.7 °C)   T tablet (5 mg total) by mouth nightly as needed for Anxiety. , Print, Disp-5 tablet, R-0                        Disposition and Plan     Clinical Impression:  Anxiety  (primary encounter diagnosis)    Disposition:  Discharge    Follow-up:  Blake Worthy,

## 2020-08-03 ENCOUNTER — OFFICE VISIT (OUTPATIENT)
Dept: GASTROENTEROLOGY | Facility: CLINIC | Age: 30
End: 2020-08-03
Payer: MEDICAID

## 2020-08-03 ENCOUNTER — TELEPHONE (OUTPATIENT)
Dept: PULMONOLOGY | Facility: CLINIC | Age: 30
End: 2020-08-03

## 2020-08-03 VITALS
SYSTOLIC BLOOD PRESSURE: 130 MMHG | WEIGHT: 315 LBS | BODY MASS INDEX: 45.1 KG/M2 | DIASTOLIC BLOOD PRESSURE: 90 MMHG | HEIGHT: 70 IN | TEMPERATURE: 97 F

## 2020-08-03 DIAGNOSIS — K58.0 IRRITABLE BOWEL SYNDROME WITH DIARRHEA: ICD-10-CM

## 2020-08-03 DIAGNOSIS — K21.00 GASTROESOPHAGEAL REFLUX DISEASE WITH ESOPHAGITIS: Primary | ICD-10-CM

## 2020-08-03 DIAGNOSIS — R11.2 INTRACTABLE VOMITING WITH NAUSEA, UNSPECIFIED VOMITING TYPE: ICD-10-CM

## 2020-08-03 PROCEDURE — 3008F BODY MASS INDEX DOCD: CPT | Performed by: INTERNAL MEDICINE

## 2020-08-03 PROCEDURE — 3080F DIAST BP >= 90 MM HG: CPT | Performed by: INTERNAL MEDICINE

## 2020-08-03 PROCEDURE — 3075F SYST BP GE 130 - 139MM HG: CPT | Performed by: INTERNAL MEDICINE

## 2020-08-03 PROCEDURE — 99214 OFFICE O/P EST MOD 30 MIN: CPT | Performed by: INTERNAL MEDICINE

## 2020-08-03 RX ORDER — AMOXICILLIN 500 MG/1
500 CAPSULE ORAL 3 TIMES DAILY
COMMUNITY
Start: 2020-02-07 | End: 2020-12-17 | Stop reason: ALTCHOICE

## 2020-08-03 RX ORDER — PANTOPRAZOLE SODIUM 40 MG/1
40 TABLET, DELAYED RELEASE ORAL
Qty: 90 TABLET | Refills: 3 | Status: SHIPPED | OUTPATIENT
Start: 2020-08-03 | End: 2020-09-24

## 2020-08-03 NOTE — PROGRESS NOTES
[address] Gastroenterology  Clinic Follow-up Visit    Patient presents with:   Follow - Up        Subjective/HPI:   Kojo Ballard is a 26year old patient of Dr. Mahi Cruz with history of anxiety, ADHD, biopolar and IBS and reflux, who presents for f colonoscopy given workup was done and age.  No weight loss or blood in the stool  Discussed EGD, will get records now and consider in the future  Low FODMAP diet  Reglan or metoclopramide is a medication used to treat gastroparesis or abnormal stomach empty esophagus biopsies       In office today, pt presents for f/u. Feeling better with reflux. Now sleep schedule is switched around. Mom and dad working so no ride. He also is afraid with COVID.    Overall, the patient feels better and denies any GI symptom visit):  Current Outpatient Medications   Medication Sig Dispense Refill   • amoxicillin 500 MG Oral Cap Take 500 mg by mouth 3 (three) times daily.      • Cefpodoxime Proxetil 200 MG Oral Tab Take 1 tablet (200 mg total) by mouth 2 (two) times daily for 10 by nebulization 3 (three) times daily. 90 vial 5   • gabapentin 300 MG Oral Cap TAKE 1 CAPSULE BY MOUTH TWICE DAILY  0   • Loperamide HCl (IMODIUM A-D OR) Take by mouth daily.      • Fluticasone Furoate-Vilanterol (BREO ELLIPTA) 200-25 MCG/INH Inhalation Ae Neuro: Alert and oriented x4, and patient is having movements of all 4 extremities   Psych: Pt has a normal mood and affect, behavior is normal    Nursing note and vitals reviewed      Labs/Imaging:     Patient's labs and imaging were reviewed and discus Prescriptions      No prescriptions requested or ordered in this encounter       Imaging & Referrals:  None

## 2020-08-03 NOTE — PATIENT INSTRUCTIONS
The pathophysiology of acid reflux was discussed. Discussed patient symptoms and triggers.  Reviewed anti-reflux measures such as raising the head of the bed at night, avoiding tight clothing or belts, avoiding eating late at night and not lying down shortl

## 2020-08-04 ENCOUNTER — TELEPHONE (OUTPATIENT)
Dept: GASTROENTEROLOGY | Facility: CLINIC | Age: 30
End: 2020-08-04

## 2020-08-04 NOTE — TELEPHONE ENCOUNTER
Per Dr. Sheldon Read:    Discussed EGD-- wants to wait 6 months given COVID    Patient outreach entered for 6 months for EGD.

## 2020-08-06 ENCOUNTER — TELEPHONE (OUTPATIENT)
Dept: GASTROENTEROLOGY | Facility: CLINIC | Age: 30
End: 2020-08-06

## 2020-08-06 NOTE — TELEPHONE ENCOUNTER
Current Outpatient Medications   Medication Sig Dispense Refill   • Pantoprazole Sodium 40 MG Oral Tab EC Take 1 tablet (40 mg total) by mouth Before Dinner. 90 tablet 3     Per pharmacy a Prior Auth is required for this med.   To complete visit:    Rama Back

## 2020-08-11 NOTE — TELEPHONE ENCOUNTER
[address] [place] [address] Ave. [phone]    I spoke to Sanjay Faustin.  And she is going to send me the form for the PA

## 2020-08-17 NOTE — TELEPHONE ENCOUNTER
Medication PA Requested:  Pantoprazole 40 MG                                                         CoverMyMeds Used:  Yes  Key: [id number]  Sig: take 1 tablet by mouth daily   DX Code:  K21.0                                     PA submitted with LOV note an

## 2020-08-18 NOTE — TELEPHONE ENCOUNTER
PA approved for Pantoprazole. Approval details have been faxed to office. makerSQR message sent to patient. Contacted [facility] and notified them of PA approval. Rx went through with insurance.

## 2020-09-16 ENCOUNTER — PATIENT MESSAGE (OUTPATIENT)
Dept: GASTROENTEROLOGY | Facility: CLINIC | Age: 30
End: 2020-09-16

## 2020-09-17 RX ORDER — FLUTICASONE PROPIONATE 50 MCG
SPRAY, SUSPENSION (ML) NASAL
Qty: 16 G | Refills: 1 | Status: SHIPPED | OUTPATIENT
Start: 2020-09-17 | End: 2020-12-04

## 2020-09-17 RX ORDER — CETIRIZINE HYDROCHLORIDE 10 MG/1
10 TABLET ORAL DAILY
Qty: 30 TABLET | Refills: 1 | Status: SHIPPED | OUTPATIENT
Start: 2020-09-17 | End: 2020-11-17

## 2020-09-17 NOTE — TELEPHONE ENCOUNTER
From: Augie Telles  To: Luigi Peña MD  Sent: 9/16/2020 5:47 PM CDT  Subject: Other    Helalek Varela. I've started taking the pantoprazole and I've noticed that I'm getting strong heartburn. When I was on the omeparozle I did not have this.  Its en

## 2020-09-24 ENCOUNTER — OFFICE VISIT (OUTPATIENT)
Dept: PULMONOLOGY | Facility: CLINIC | Age: 30
End: 2020-09-24
Payer: MEDICAID

## 2020-09-24 VITALS
HEIGHT: 70 IN | TEMPERATURE: 98 F | BODY MASS INDEX: 45.1 KG/M2 | HEART RATE: 91 BPM | OXYGEN SATURATION: 93 % | SYSTOLIC BLOOD PRESSURE: 107 MMHG | WEIGHT: 315 LBS | RESPIRATION RATE: 18 BRPM | DIASTOLIC BLOOD PRESSURE: 73 MMHG

## 2020-09-24 DIAGNOSIS — J45.20 MILD INTERMITTENT EXTRINSIC ASTHMA WITHOUT COMPLICATION: Primary | ICD-10-CM

## 2020-09-24 PROCEDURE — 3078F DIAST BP <80 MM HG: CPT | Performed by: INTERNAL MEDICINE

## 2020-09-24 PROCEDURE — 3074F SYST BP LT 130 MM HG: CPT | Performed by: INTERNAL MEDICINE

## 2020-09-24 PROCEDURE — 3008F BODY MASS INDEX DOCD: CPT | Performed by: INTERNAL MEDICINE

## 2020-09-24 PROCEDURE — 99213 OFFICE O/P EST LOW 20 MIN: CPT | Performed by: INTERNAL MEDICINE

## 2020-09-24 RX ORDER — OMEPRAZOLE 40 MG/1
40 CAPSULE, DELAYED RELEASE ORAL DAILY
Qty: 30 CAPSULE | Refills: 11 | Status: SHIPPED | OUTPATIENT
Start: 2020-09-24 | End: 2021-09-09

## 2020-09-24 NOTE — PROGRESS NOTES
Referring Physician  Gomez Flores DO    History of Present Illness  Patient seen today for follow-up visit to pulmonary clinic. States asthma symptoms improved after starting Breo maintenance inhaler therapy.   Patient briefly was on [facility] with worsen Disp: 90 vial, Rfl: 5    •  gabapentin 300 MG Oral Cap, TAKE 1 CAPSULE BY MOUTH TWICE DAILY, Disp: , Rfl: 0    •  Loperamide HCl (IMODIUM A-D OR), Take by mouth daily. , Disp: , Rfl:     •  Fluticasone Furoate-Vilanterol (BREO ELLIPTA) 200-25 MCG/INH Cuate López

## 2020-09-25 ENCOUNTER — TELEPHONE (OUTPATIENT)
Dept: PULMONOLOGY | Facility: CLINIC | Age: 30
End: 2020-09-25

## 2020-09-25 NOTE — TELEPHONE ENCOUNTER
I had a office appointment with the patient yesterday. He states that his insurance does not cover Breo. He did try Mack Sontag which was covered by his insurance which he did not tolerate well. Patient requesting us to complete prior authorization for [facility].

## 2020-09-29 NOTE — TELEPHONE ENCOUNTER
Prior authorization submitted for Breo at [email or web address].[employer]. PhoneAndPhone. Key [id number], fax determination will be sent, & any questions about PA submission- contact [employer] #[phone] per [email or web address].[employer]. PhoneAndPhone.

## 2020-10-01 ENCOUNTER — PATIENT MESSAGE (OUTPATIENT)
Dept: PULMONOLOGY | Facility: CLINIC | Age: 30
End: 2020-10-01

## 2020-10-01 NOTE — TELEPHONE ENCOUNTER
Clinical review results rcvd via fax from [address] [place] [address],[address] stating that pt is approved for the requested medication, Breo Ellipta 200-25 MCG/INH, AEPB, effective 6/30/20 through 9/30/21 (case #[id number], request ID [id number]).

## 2020-10-02 NOTE — TELEPHONE ENCOUNTER
From: Kaitlin Telles  To: Pat Coffman DO  Sent: 10/1/2020 6:51 PM CDT  Subject: Berkley Washburn Asp,    This is Suzette Squires. You asked me to check in this week on the status of the authorization of the [facility].  I'd like to know how that is going so pleas

## 2020-10-02 NOTE — TELEPHONE ENCOUNTER
You may let the patient know that his Sherryn Meir was ordered after prior authorization completed. I have placed order for Breo with 5 additional refills to his [address].

## 2020-10-13 ENCOUNTER — HOSPITAL ENCOUNTER (OUTPATIENT)
Age: 30
Discharge: HOME | End: 2020-10-13
Attending: EMERGENCY MEDICINE
Payer: MEDICAID

## 2020-10-13 VITALS
OXYGEN SATURATION: 95 % | RESPIRATION RATE: 16 BRPM | DIASTOLIC BLOOD PRESSURE: 81 MMHG | HEIGHT: 70 IN | SYSTOLIC BLOOD PRESSURE: 145 MMHG | HEART RATE: 81 BPM | TEMPERATURE: 97 F | BODY MASS INDEX: 45.1 KG/M2 | WEIGHT: 315 LBS

## 2020-10-13 DIAGNOSIS — H61.22 IMPACTED CERUMEN OF LEFT EAR: Primary | ICD-10-CM

## 2020-10-13 PROCEDURE — 99213 OFFICE O/P EST LOW 20 MIN: CPT | Performed by: EMERGENCY MEDICINE

## 2020-10-13 NOTE — ED PROVIDER NOTES
Patient Seen in: [facility] In [address] [place] [address]      History   Patient presents with:  Ear Problem Pain    Stated Complaint: Lt ear Pressure    HPI  Patient feels like his left ear needs to be cleaned.   He has a mild pressure sensation and Exam  Vitals signs and nursing note reviewed. Constitutional:       General: He is not in acute distress. Appearance: He is well-developed. Comments: Well appearing   HENT:      Head: Normocephalic and atraumatic.       Right Ear: Tympanic [place]

## 2020-10-24 ENCOUNTER — HOSPITAL ENCOUNTER (EMERGENCY)
Facility: HOSPITAL | Age: 30
Discharge: HOME | End: 2020-10-24
Attending: EMERGENCY MEDICINE
Payer: MEDICAID

## 2020-10-24 ENCOUNTER — APPOINTMENT (OUTPATIENT)
Dept: GENERAL RADIOLOGY | Facility: HOSPITAL | Age: 30
End: 2020-10-24
Attending: EMERGENCY MEDICINE
Payer: MEDICAID

## 2020-10-24 VITALS
BODY MASS INDEX: 54 KG/M2 | SYSTOLIC BLOOD PRESSURE: 130 MMHG | RESPIRATION RATE: 18 BRPM | HEART RATE: 89 BPM | TEMPERATURE: 99 F | OXYGEN SATURATION: 94 % | WEIGHT: 315 LBS | DIASTOLIC BLOOD PRESSURE: 76 MMHG

## 2020-10-24 DIAGNOSIS — R07.9 CHEST PAIN OF UNCERTAIN ETIOLOGY: Primary | ICD-10-CM

## 2020-10-24 PROCEDURE — 71045 X-RAY EXAM CHEST 1 VIEW: CPT | Performed by: EMERGENCY MEDICINE

## 2020-10-24 PROCEDURE — 84484 ASSAY OF TROPONIN QUANT: CPT | Performed by: EMERGENCY MEDICINE

## 2020-10-24 PROCEDURE — 99284 EMERGENCY DEPT VISIT MOD MDM: CPT

## 2020-10-24 PROCEDURE — 85379 FIBRIN DEGRADATION QUANT: CPT | Performed by: EMERGENCY MEDICINE

## 2020-10-24 PROCEDURE — 85025 COMPLETE CBC W/AUTO DIFF WBC: CPT | Performed by: EMERGENCY MEDICINE

## 2020-10-24 PROCEDURE — 80048 BASIC METABOLIC PNL TOTAL CA: CPT | Performed by: EMERGENCY MEDICINE

## 2020-10-24 PROCEDURE — 93010 ELECTROCARDIOGRAM REPORT: CPT | Performed by: EMERGENCY MEDICINE

## 2020-10-24 PROCEDURE — 36415 COLL VENOUS BLD VENIPUNCTURE: CPT

## 2020-10-24 PROCEDURE — 93005 ELECTROCARDIOGRAM TRACING: CPT

## 2020-10-24 RX ORDER — QUETIAPINE 100 MG/1
100 TABLET, FILM COATED ORAL NIGHTLY
COMMUNITY

## 2020-10-24 NOTE — ED PROVIDER NOTES
Patient Seen in: [facility] AND [facility] Emergency Department      History   Patient presents with:  Chest Pain Angina  Difficulty Breathing    Stated Complaint:     HPI    48-year-old male with history of bipolar disorder, asthma, COPD, hypertension, here wit Neurological: Negative for dizziness. Psychiatric/Behavioral: Negative for suicidal ideas. All other systems reviewed and are negative. Positive for stated complaint:   Other systems are as noted in HPI. Constitutional and vital signs reviewed. LAVENDER    Collection Time: 10/24/20  2:22 AM   Result Value Ref Range    Hold Lavender Auto Resulted    RAINBOW DRAW LIGHT GREEN    Collection Time: 10/24/20  2:22 AM   Result Value Ref Range    Hold Lt Green Auto Resulted    RAINBOW DRAW GOLD    Collect Range    Hold Blue Auto Resulted    D-DIMER    Collection Time: 10/24/20  2:23 AM   Result Value Ref Range    D-Dimer <0.27 <0.50 ug/mL FEU       Imaging Results Available and Reviewed by me while in ED:  No results found.     AP CHEST RADIOGRAPH at 3 AM expresses understanding and agrees to d/c instructions    EMERGENCY DEPARTMENT MEDICAL DECISION MAKING:  After obtaining the patient's history, performing the physical exam and reviewing the diagnostics, multiple initial diagnoses were considered based on

## 2020-10-24 NOTE — ED NOTES
06/10/20 0029   Cervical Exam   Dilation (cm) 8.5   Eff 100 %   Station 0   Vaginal exam done by?  Tatyana Stephens RN     Patient requesting  SVE for pressure in her bottom. SVE 8.5/100/0. Patient placed on lateral right side on peanut ball. Pt dcd to home aox3, ambulatory, discharge instruction given and voices understanding, denies any concern

## 2020-11-19 ENCOUNTER — HOSPITAL ENCOUNTER (EMERGENCY)
Facility: HOSPITAL | Age: 30
Discharge: HOME | End: 2020-11-19
Attending: EMERGENCY MEDICINE
Payer: MEDICAID

## 2020-11-19 ENCOUNTER — APPOINTMENT (OUTPATIENT)
Dept: GENERAL RADIOLOGY | Facility: HOSPITAL | Age: 30
End: 2020-11-19
Attending: EMERGENCY MEDICINE
Payer: MEDICAID

## 2020-11-19 VITALS
OXYGEN SATURATION: 94 % | TEMPERATURE: 98 F | WEIGHT: 315 LBS | BODY MASS INDEX: 53 KG/M2 | DIASTOLIC BLOOD PRESSURE: 76 MMHG | HEART RATE: 69 BPM | RESPIRATION RATE: 15 BRPM | SYSTOLIC BLOOD PRESSURE: 120 MMHG

## 2020-11-19 DIAGNOSIS — G47.00 INSOMNIA, UNSPECIFIED TYPE: ICD-10-CM

## 2020-11-19 DIAGNOSIS — R06.01 ORTHOPNEA: Primary | ICD-10-CM

## 2020-11-19 PROCEDURE — 80048 BASIC METABOLIC PNL TOTAL CA: CPT | Performed by: EMERGENCY MEDICINE

## 2020-11-19 PROCEDURE — 99284 EMERGENCY DEPT VISIT MOD MDM: CPT

## 2020-11-19 PROCEDURE — 93010 ELECTROCARDIOGRAM REPORT: CPT | Performed by: EMERGENCY MEDICINE

## 2020-11-19 PROCEDURE — 85025 COMPLETE CBC W/AUTO DIFF WBC: CPT | Performed by: EMERGENCY MEDICINE

## 2020-11-19 PROCEDURE — 83880 ASSAY OF NATRIURETIC PEPTIDE: CPT | Performed by: EMERGENCY MEDICINE

## 2020-11-19 PROCEDURE — 93005 ELECTROCARDIOGRAM TRACING: CPT

## 2020-11-19 PROCEDURE — 85379 FIBRIN DEGRADATION QUANT: CPT | Performed by: EMERGENCY MEDICINE

## 2020-11-19 PROCEDURE — 71045 X-RAY EXAM CHEST 1 VIEW: CPT | Performed by: EMERGENCY MEDICINE

## 2020-11-19 PROCEDURE — 36415 COLL VENOUS BLD VENIPUNCTURE: CPT

## 2020-11-19 NOTE — ED PROVIDER NOTES
Patient Seen in: [facility] AND [facility] Emergency Department      History   Patient presents with:  Difficulty Breathing  Insomnia    Stated Complaint: Difficulty breathing, insomnia    HPI    56-year-old male presents for evaluation for trouble breathing.   Ji Resendiz systems reviewed and negative except as noted above.     Physical Exam     ED Triage Vitals   BP 11/19/20 1021 121/71   Pulse 11/19/20 1021 96   Resp 11/19/20 1021 18   Temp 11/19/20 1021 98 °F (36.7 °C)   Temp src --    SpO2 11/19/20 1021 95 %   O2 Device DIFFERENTIAL - Abnormal; Notable for the following components:    RDW-SD 48.3 (*)     RDW 15.2 (*)     All other components within normal limits   D-DIMER - Normal   PRO BETA NATRIURETIC PEPTIDE - Normal   RAPID SARS-COV-2 BY PCR - Normal   CBC WITH DIFFER No visible mass or adenopathy. LUNGS/PLEURA: Lungs are mildly hypoexpanded. Negative for focal consolidation, pleural effusion, or pneumothorax. BONES: No fracture or visible bony lesion. OTHER: Negative.           CONCLUSION:   No evidence for acute card

## 2020-11-20 RX ORDER — CETIRIZINE HYDROCHLORIDE 10 MG/1
10 TABLET ORAL DAILY
Qty: 30 TABLET | Refills: 0 | Status: SHIPPED | OUTPATIENT
Start: 2020-11-20 | End: 2020-12-17

## 2020-11-23 ENCOUNTER — OFFICE VISIT (OUTPATIENT)
Dept: FAMILY MEDICINE CLINIC | Facility: CLINIC | Age: 30
End: 2020-11-23
Payer: MEDICAID

## 2020-11-23 VITALS
BODY MASS INDEX: 45.1 KG/M2 | TEMPERATURE: 98 F | HEIGHT: 70 IN | WEIGHT: 315 LBS | HEART RATE: 102 BPM | DIASTOLIC BLOOD PRESSURE: 81 MMHG | SYSTOLIC BLOOD PRESSURE: 129 MMHG

## 2020-11-23 DIAGNOSIS — R10.11 RUQ PAIN: Primary | ICD-10-CM

## 2020-11-23 PROCEDURE — 3074F SYST BP LT 130 MM HG: CPT | Performed by: FAMILY MEDICINE

## 2020-11-23 PROCEDURE — 3079F DIAST BP 80-89 MM HG: CPT | Performed by: FAMILY MEDICINE

## 2020-11-23 PROCEDURE — 99214 OFFICE O/P EST MOD 30 MIN: CPT | Performed by: FAMILY MEDICINE

## 2020-11-23 PROCEDURE — 3008F BODY MASS INDEX DOCD: CPT | Performed by: FAMILY MEDICINE

## 2020-12-05 RX ORDER — FLUTICASONE PROPIONATE 50 MCG
SPRAY, SUSPENSION (ML) NASAL
Qty: 16 G | Refills: 1 | Status: SHIPPED | OUTPATIENT
Start: 2020-12-05 | End: 2020-12-17

## 2020-12-11 ENCOUNTER — HOSPITAL ENCOUNTER (OUTPATIENT)
Dept: ULTRASOUND IMAGING | Age: 30
Discharge: HOME | End: 2020-12-11
Attending: FAMILY MEDICINE
Payer: MEDICAID

## 2020-12-11 DIAGNOSIS — R10.11 RUQ PAIN: ICD-10-CM

## 2020-12-11 PROCEDURE — 76705 ECHO EXAM OF ABDOMEN: CPT | Performed by: FAMILY MEDICINE

## 2020-12-15 RX ORDER — TIOTROPIUM BROMIDE INHALATION SPRAY 1.56 UG/1
SPRAY, METERED RESPIRATORY (INHALATION)
Qty: 1 INHALER | Refills: 5 | Status: SHIPPED | OUTPATIENT
Start: 2020-12-15 | End: 2021-05-27

## 2020-12-15 NOTE — TELEPHONE ENCOUNTER
Last refill 6/9/20  Last office visit 9/24/20     Dr. Jacques Zamora- Please review/sign pended order.

## 2020-12-17 ENCOUNTER — OFFICE VISIT (OUTPATIENT)
Dept: FAMILY MEDICINE CLINIC | Facility: CLINIC | Age: 30
End: 2020-12-17
Payer: MEDICAID

## 2020-12-17 VITALS
WEIGHT: 315 LBS | DIASTOLIC BLOOD PRESSURE: 79 MMHG | SYSTOLIC BLOOD PRESSURE: 120 MMHG | BODY MASS INDEX: 45.1 KG/M2 | HEART RATE: 91 BPM | HEIGHT: 70 IN

## 2020-12-17 DIAGNOSIS — R10.11 RUQ PAIN: Primary | ICD-10-CM

## 2020-12-17 PROCEDURE — 3074F SYST BP LT 130 MM HG: CPT | Performed by: FAMILY MEDICINE

## 2020-12-17 PROCEDURE — 99213 OFFICE O/P EST LOW 20 MIN: CPT | Performed by: FAMILY MEDICINE

## 2020-12-17 PROCEDURE — 3078F DIAST BP <80 MM HG: CPT | Performed by: FAMILY MEDICINE

## 2020-12-17 PROCEDURE — 3008F BODY MASS INDEX DOCD: CPT | Performed by: FAMILY MEDICINE

## 2020-12-17 RX ORDER — FLUTICASONE PROPIONATE 50 MCG
SPRAY, SUSPENSION (ML) NASAL
Qty: 16 G | Refills: 5 | Status: SHIPPED | OUTPATIENT
Start: 2020-12-17 | End: 2021-08-05

## 2020-12-17 RX ORDER — CETIRIZINE HYDROCHLORIDE 10 MG/1
10 TABLET ORAL DAILY
Qty: 30 TABLET | Refills: 11 | Status: SHIPPED | OUTPATIENT
Start: 2020-12-17 | End: 2021-12-26

## 2020-12-17 NOTE — PROGRESS NOTES
HPI:    Patient ID: Gabby Duval is a 31year old male. HPI  Patient presents with:  Test Results: follow up for US results   Normal resuts. Normal recent labs of LFTs and CBC. Pain is the same. Review of Systems   Constitutional: Negative.     Resp week.    0     Allergies:  Haldol [Haloperidol]    OTHER (SEE COMMENTS)    Comment:Cannot sleep, psychiatric problem  Mucinex Dm Maximum *    OTHER (SEE COMMENTS)    Comment:Stomach cramps  Zofran [Ondansetron]    OTHER (SEE COMMENTS)    Comment:constipati

## 2020-12-18 ENCOUNTER — TELEPHONE (OUTPATIENT)
Dept: PULMONOLOGY | Facility: CLINIC | Age: 30
End: 2020-12-18

## 2020-12-18 NOTE — TELEPHONE ENCOUNTER
Pharmacy requests refill:    •  SPIRIVA RESPIMAT 1.25 MCG/ACT Inhalation Aero Soln, INHALE 2 PUFFS BY MOUTH INTO THE LUNGS ONCE DAILY, Disp: 1 Inhaler, Rfl: 5

## 2020-12-29 ENCOUNTER — HOSPITAL ENCOUNTER (EMERGENCY)
Facility: HOSPITAL | Age: 30
Discharge: STILL ADMITTED OR ADMINISTRATIVE | End: 2020-12-30
Payer: MEDICAID

## 2020-12-29 ENCOUNTER — APPOINTMENT (OUTPATIENT)
Dept: GENERAL RADIOLOGY | Facility: HOSPITAL | Age: 30
End: 2020-12-29
Attending: NURSE PRACTITIONER
Payer: MEDICAID

## 2020-12-29 ENCOUNTER — HOSPITAL ENCOUNTER (OUTPATIENT)
Age: 30
Discharge: ANOTHER HOSPITAL | End: 2020-12-29
Attending: EMERGENCY MEDICINE
Payer: MEDICAID

## 2020-12-29 ENCOUNTER — HOSPITAL ENCOUNTER (EMERGENCY)
Facility: HOSPITAL | Age: 30
Discharge: HOME | End: 2020-12-29
Payer: MEDICAID

## 2020-12-29 VITALS
TEMPERATURE: 97 F | OXYGEN SATURATION: 95 % | HEIGHT: 71 IN | HEART RATE: 101 BPM | RESPIRATION RATE: 22 BRPM | SYSTOLIC BLOOD PRESSURE: 125 MMHG | WEIGHT: 315 LBS | BODY MASS INDEX: 44.1 KG/M2 | DIASTOLIC BLOOD PRESSURE: 74 MMHG

## 2020-12-29 VITALS
DIASTOLIC BLOOD PRESSURE: 60 MMHG | RESPIRATION RATE: 19 BRPM | SYSTOLIC BLOOD PRESSURE: 100 MMHG | OXYGEN SATURATION: 95 % | TEMPERATURE: 98 F | HEART RATE: 86 BPM

## 2020-12-29 DIAGNOSIS — J45.41 MODERATE PERSISTENT ASTHMA WITH ACUTE EXACERBATION: Primary | ICD-10-CM

## 2020-12-29 DIAGNOSIS — R06.02 SHORTNESS OF BREATH: Primary | ICD-10-CM

## 2020-12-29 PROCEDURE — 85025 COMPLETE CBC W/AUTO DIFF WBC: CPT | Performed by: NURSE PRACTITIONER

## 2020-12-29 PROCEDURE — 84484 ASSAY OF TROPONIN QUANT: CPT | Performed by: NURSE PRACTITIONER

## 2020-12-29 PROCEDURE — 99284 EMERGENCY DEPT VISIT MOD MDM: CPT

## 2020-12-29 PROCEDURE — 80048 BASIC METABOLIC PNL TOTAL CA: CPT | Performed by: NURSE PRACTITIONER

## 2020-12-29 PROCEDURE — 93010 ELECTROCARDIOGRAM REPORT: CPT | Performed by: INTERNAL MEDICINE

## 2020-12-29 PROCEDURE — 94640 AIRWAY INHALATION TREATMENT: CPT

## 2020-12-29 PROCEDURE — 71045 X-RAY EXAM CHEST 1 VIEW: CPT | Performed by: NURSE PRACTITIONER

## 2020-12-29 PROCEDURE — 93005 ELECTROCARDIOGRAM TRACING: CPT

## 2020-12-29 PROCEDURE — 99214 OFFICE O/P EST MOD 30 MIN: CPT | Performed by: EMERGENCY MEDICINE

## 2020-12-29 RX ORDER — ALBUTEROL SULFATE 90 UG/1
2 AEROSOL, METERED RESPIRATORY (INHALATION) EVERY 4 HOURS PRN
Qty: 1 INHALER | Refills: 0 | Status: SHIPPED | OUTPATIENT
Start: 2020-12-29 | End: 2021-01-28

## 2020-12-29 RX ORDER — IPRATROPIUM BROMIDE AND ALBUTEROL SULFATE 2.5; .5 MG/3ML; MG/3ML
3 SOLUTION RESPIRATORY (INHALATION) ONCE
Status: COMPLETED | OUTPATIENT
Start: 2020-12-29 | End: 2020-12-29

## 2020-12-29 NOTE — ED PROVIDER NOTES
Patient Seen in: [facility]      History   Patient presents with:  Shortness Of Breath    Stated Complaint: Shortness of breath    HPI  Patient reports being short of breath and has had a mild runny nose and occasional cough.   This started a above.    Physical Exam     ED Triage Vitals [12/29/20 1619]   /74   Pulse 101   Resp 22   Temp 97 °F (36.1 °C)   Temp src Temporal   SpO2 95 %   O2 Device None (Room air)       Current:/74   Pulse 101   Temp 97 °F (36.1 °C) (Temporal)   Resp 2 Patient understands and agrees with plan. Disposition and Plan     Clinical Impression:  Shortness of breath  (primary encounter diagnosis)    Disposition: Ic to ed  12/29/2020  4:39 pm    Follow-up:  No follow-up provider specified.         Kathia Rivera

## 2020-12-29 NOTE — ED NOTES
PATIENT IS HERE WITH A COMPLAIN OF SHORTNESS OF BREATH SINCE 5AM. PATIENT HAS A HISTORY OF ASTHMA. PATIENT STATES USING INHALERS WITH NO RELIEF.

## 2020-12-29 NOTE — ED PROVIDER NOTES
Patient Seen in: [facility] AND [facility] Emergency Department      History   No chief complaint on file. Stated Complaint: SOB     29yo/m with hx of ADHD, Asthma Bipolar Disorder, COPD, HTN, IBS reports to the ED with complaints of dyspnea.  Started at 0500 distress. Appearance: He is well-developed. HENT:      Head: Normocephalic and atraumatic. Eyes:      Conjunctiva/sclera: Conjunctivae normal.      Pupils: Pupils are equal, round, and reactive to light.    Neck:      Musculoskeletal: Normal range o results for these tests on the individual orders.    RAINBOW DRAW BLUE   RAINBOW DRAW LAVENDER   RAINBOW DRAW GOLD     EKG    Rate 99  No HARSH no ectopy normal axis               Additional Information (per Vision Radiologist):    X-RAY CHEST: 2 AP UPRIGHT I

## 2020-12-29 NOTE — ED INITIAL ASSESSMENT (HPI)
Just released from the ED with same complaints of shortness of breath since 5am. Easy non labored resp- pink w/d has neb and mdi at home. Had extensive work up in the ed. . stts may need prednisone ,appears anxious. rapid speech face flushed

## 2021-01-04 ENCOUNTER — OFFICE VISIT (OUTPATIENT)
Dept: FAMILY MEDICINE CLINIC | Facility: CLINIC | Age: 31
End: 2021-01-04
Payer: MEDICAID

## 2021-01-04 VITALS
HEIGHT: 71 IN | SYSTOLIC BLOOD PRESSURE: 128 MMHG | DIASTOLIC BLOOD PRESSURE: 80 MMHG | WEIGHT: 315 LBS | HEART RATE: 103 BPM | BODY MASS INDEX: 44.1 KG/M2

## 2021-01-04 DIAGNOSIS — R79.89 LOW VITAMIN D LEVEL: ICD-10-CM

## 2021-01-04 DIAGNOSIS — Z00.00 ROUTINE GENERAL MEDICAL EXAMINATION AT A HEALTH CARE FACILITY: Primary | ICD-10-CM

## 2021-01-04 DIAGNOSIS — I10 ESSENTIAL HYPERTENSION: ICD-10-CM

## 2021-01-04 DIAGNOSIS — J45.40 MODERATE PERSISTENT ASTHMA WITHOUT COMPLICATION: ICD-10-CM

## 2021-01-04 DIAGNOSIS — R10.11 RUQ PAIN: ICD-10-CM

## 2021-01-04 DIAGNOSIS — F41.9 ANXIETY: ICD-10-CM

## 2021-01-04 PROBLEM — R11.2 NAUSEA AND VOMITING: Status: RESOLVED | Noted: 2019-11-04 | Resolved: 2021-01-04

## 2021-01-04 PROCEDURE — 99395 PREV VISIT EST AGE 18-39: CPT | Performed by: FAMILY MEDICINE

## 2021-01-04 PROCEDURE — 3074F SYST BP LT 130 MM HG: CPT | Performed by: FAMILY MEDICINE

## 2021-01-04 PROCEDURE — 3008F BODY MASS INDEX DOCD: CPT | Performed by: FAMILY MEDICINE

## 2021-01-04 PROCEDURE — 3079F DIAST BP 80-89 MM HG: CPT | Performed by: FAMILY MEDICINE

## 2021-01-04 RX ORDER — VALSARTAN 80 MG/1
80 TABLET ORAL
Qty: 30 TABLET | Refills: 5 | Status: SHIPPED | OUTPATIENT
Start: 2021-01-04 | End: 2021-08-05

## 2021-01-04 NOTE — PROGRESS NOTES
HPI:    Patient ID: Winnie Doyle is a 31year old male.     HPI  Patient presents with:  Physical: annual physical,     Past Medical History:   Diagnosis Date   • ADHD (attention deficit hyperactivity disorder)    • Asthma    • Attention deficit hyperacti capsule 11   • Fluticasone Furoate-Vilanterol (BREO ELLIPTA) 200-25 MCG/INH Inhalation Aerosol Powder, Breath Activated Inhale 1 puff into the lungs daily.  1 each 5   • Simethicone (GAS-X OR)      • SF 1.1 % Dental Gel BRUSH ON TEETH IN EVENING BEFORE BED diagnosis)  Moderate persistent asthma without complication  Essential hypertension  Anxiety  Ruq pain  Low vitamin d level    Labs ordered. meds up to date. CPM.  Discussed weight loss and exercise.    Orders Placed This Encounter      Lipid Panel [E]

## 2021-01-06 ENCOUNTER — LAB ENCOUNTER (OUTPATIENT)
Dept: LAB | Age: 31
End: 2021-01-06
Attending: FAMILY MEDICINE
Payer: MEDICAID

## 2021-01-06 DIAGNOSIS — R79.89 LOW VITAMIN D LEVEL: ICD-10-CM

## 2021-01-06 DIAGNOSIS — Z00.00 ROUTINE GENERAL MEDICAL EXAMINATION AT A HEALTH CARE FACILITY: ICD-10-CM

## 2021-01-06 LAB
ALBUMIN SERPL-MCNC: 3.4 G/DL (ref 3.4–5)
ALBUMIN/GLOB SERPL: 0.8 {RATIO} (ref 1–2)
ALP LIVER SERPL-CCNC: 129 U/L
ALT SERPL-CCNC: 30 U/L
ANION GAP SERPL CALC-SCNC: 3 MMOL/L (ref 0–18)
AST SERPL-CCNC: 18 U/L (ref 15–37)
BILIRUB SERPL-MCNC: 0.4 MG/DL (ref 0.1–2)
BUN BLD-MCNC: 7 MG/DL (ref 7–18)
BUN/CREAT SERPL: 8 (ref 10–20)
CALCIUM BLD-MCNC: 9.3 MG/DL (ref 8.5–10.1)
CHLORIDE SERPL-SCNC: 105 MMOL/L (ref 98–112)
CHOLEST SMN-MCNC: 147 MG/DL (ref ?–200)
CO2 SERPL-SCNC: 32 MMOL/L (ref 21–32)
CREAT BLD-MCNC: 0.88 MG/DL
GLOBULIN PLAS-MCNC: 4.2 G/DL (ref 2.8–4.4)
GLUCOSE BLD-MCNC: 91 MG/DL (ref 70–99)
HDLC SERPL-MCNC: 35 MG/DL (ref 40–59)
LDLC SERPL CALC-MCNC: 91 MG/DL (ref ?–100)
M PROTEIN MFR SERPL ELPH: 7.6 G/DL (ref 6.4–8.2)
NONHDLC SERPL-MCNC: 112 MG/DL (ref ?–130)
OSMOLALITY SERPL CALC.SUM OF ELEC: 288 MOSM/KG (ref 275–295)
PATIENT FASTING Y/N/NP: YES
PATIENT FASTING Y/N/NP: YES
POTASSIUM SERPL-SCNC: 3.9 MMOL/L (ref 3.5–5.1)
SODIUM SERPL-SCNC: 140 MMOL/L (ref 136–145)
TRIGL SERPL-MCNC: 105 MG/DL (ref 30–149)
VLDLC SERPL CALC-MCNC: 21 MG/DL (ref 0–30)

## 2021-01-06 PROCEDURE — 80061 LIPID PANEL: CPT

## 2021-01-06 PROCEDURE — 80053 COMPREHEN METABOLIC PANEL: CPT

## 2021-01-06 PROCEDURE — 36415 COLL VENOUS BLD VENIPUNCTURE: CPT

## 2021-01-06 PROCEDURE — 82306 VITAMIN D 25 HYDROXY: CPT

## 2021-01-08 LAB — 25(OH)D3 SERPL-MCNC: 8 NG/ML (ref 30–100)

## 2021-01-10 ENCOUNTER — PATIENT MESSAGE (OUTPATIENT)
Dept: FAMILY MEDICINE CLINIC | Facility: CLINIC | Age: 31
End: 2021-01-10

## 2021-01-11 NOTE — TELEPHONE ENCOUNTER
From: Petros Telles  To: Iris Hsieh DO  Sent: 1/10/2021 4:16 AM CST  Subject: Test Results Question    Hi Dr. Vesta Lopez,    I was wondering if you got my tests results yet and what we should do going forward?  Since I'm still in pain and we still don't

## 2021-01-14 ENCOUNTER — HOSPITAL ENCOUNTER (EMERGENCY)
Facility: HOSPITAL | Age: 31
Discharge: HOME | End: 2021-01-14
Attending: EMERGENCY MEDICINE
Payer: MEDICAID

## 2021-01-14 ENCOUNTER — APPOINTMENT (OUTPATIENT)
Dept: CT IMAGING | Facility: HOSPITAL | Age: 31
End: 2021-01-14
Attending: EMERGENCY MEDICINE
Payer: MEDICAID

## 2021-01-14 VITALS
DIASTOLIC BLOOD PRESSURE: 79 MMHG | OXYGEN SATURATION: 93 % | SYSTOLIC BLOOD PRESSURE: 117 MMHG | HEART RATE: 80 BPM | TEMPERATURE: 98 F | RESPIRATION RATE: 18 BRPM | BODY MASS INDEX: 45.1 KG/M2 | HEIGHT: 70 IN | WEIGHT: 315 LBS

## 2021-01-14 DIAGNOSIS — R10.9 CHRONIC ABDOMINAL PAIN: Primary | ICD-10-CM

## 2021-01-14 DIAGNOSIS — G89.29 CHRONIC ABDOMINAL PAIN: Primary | ICD-10-CM

## 2021-01-14 LAB
ALBUMIN SERPL-MCNC: 3.4 G/DL (ref 3.4–5)
ALBUMIN/GLOB SERPL: 0.9 {RATIO} (ref 1–2)
ALP LIVER SERPL-CCNC: 132 U/L
ALT SERPL-CCNC: 26 U/L
ANION GAP SERPL CALC-SCNC: 6 MMOL/L (ref 0–18)
AST SERPL-CCNC: 21 U/L (ref 15–37)
BASOPHILS # BLD AUTO: 0.01 X10(3) UL (ref 0–0.2)
BASOPHILS NFR BLD AUTO: 0.1 %
BILIRUB SERPL-MCNC: 0.3 MG/DL (ref 0.1–2)
BUN BLD-MCNC: 6 MG/DL (ref 7–18)
BUN/CREAT SERPL: 6.5 (ref 10–20)
CALCIUM BLD-MCNC: 9.1 MG/DL (ref 8.5–10.1)
CHLORIDE SERPL-SCNC: 105 MMOL/L (ref 98–112)
CO2 SERPL-SCNC: 29 MMOL/L (ref 21–32)
CREAT BLD-MCNC: 0.93 MG/DL
DEPRECATED RDW RBC AUTO: 46.6 FL (ref 35.1–46.3)
EOSINOPHIL # BLD AUTO: 0.11 X10(3) UL (ref 0–0.7)
EOSINOPHIL NFR BLD AUTO: 1.1 %
ERYTHROCYTE [DISTWIDTH] IN BLOOD BY AUTOMATED COUNT: 14.6 % (ref 11–15)
GLOBULIN PLAS-MCNC: 4 G/DL (ref 2.8–4.4)
GLUCOSE BLD-MCNC: 123 MG/DL (ref 70–99)
HCT VFR BLD AUTO: 43.3 %
HGB BLD-MCNC: 13.9 G/DL
IMM GRANULOCYTES # BLD AUTO: 0.03 X10(3) UL (ref 0–1)
IMM GRANULOCYTES NFR BLD: 0.3 %
LIPASE SERPL-CCNC: 51 U/L (ref 73–393)
LYMPHOCYTES # BLD AUTO: 1.65 X10(3) UL (ref 1–4)
LYMPHOCYTES NFR BLD AUTO: 16.1 %
M PROTEIN MFR SERPL ELPH: 7.4 G/DL (ref 6.4–8.2)
MCH RBC QN AUTO: 27.9 PG (ref 26–34)
MCHC RBC AUTO-ENTMCNC: 32.1 G/DL (ref 31–37)
MCV RBC AUTO: 86.8 FL
MONOCYTES # BLD AUTO: 0.59 X10(3) UL (ref 0.1–1)
MONOCYTES NFR BLD AUTO: 5.8 %
NEUTROPHILS # BLD AUTO: 7.86 X10 (3) UL (ref 1.5–7.7)
NEUTROPHILS # BLD AUTO: 7.86 X10(3) UL (ref 1.5–7.7)
NEUTROPHILS NFR BLD AUTO: 76.6 %
OSMOLALITY SERPL CALC.SUM OF ELEC: 289 MOSM/KG (ref 275–295)
PLATELET # BLD AUTO: 313 10(3)UL (ref 150–450)
POTASSIUM SERPL-SCNC: 3.5 MMOL/L (ref 3.5–5.1)
RBC # BLD AUTO: 4.99 X10(6)UL
SODIUM SERPL-SCNC: 140 MMOL/L (ref 136–145)
WBC # BLD AUTO: 10.3 X10(3) UL (ref 4–11)

## 2021-01-14 PROCEDURE — 83690 ASSAY OF LIPASE: CPT | Performed by: EMERGENCY MEDICINE

## 2021-01-14 PROCEDURE — 74177 CT ABD & PELVIS W/CONTRAST: CPT | Performed by: EMERGENCY MEDICINE

## 2021-01-14 PROCEDURE — 80053 COMPREHEN METABOLIC PANEL: CPT | Performed by: EMERGENCY MEDICINE

## 2021-01-14 PROCEDURE — 96374 THER/PROPH/DIAG INJ IV PUSH: CPT

## 2021-01-14 PROCEDURE — 85025 COMPLETE CBC W/AUTO DIFF WBC: CPT | Performed by: EMERGENCY MEDICINE

## 2021-01-14 PROCEDURE — 99284 EMERGENCY DEPT VISIT MOD MDM: CPT

## 2021-01-14 RX ORDER — MORPHINE SULFATE 4 MG/ML
2 INJECTION, SOLUTION INTRAMUSCULAR; INTRAVENOUS ONCE
Status: COMPLETED | OUTPATIENT
Start: 2021-01-14 | End: 2021-01-14

## 2021-01-14 NOTE — ED INITIAL ASSESSMENT (HPI)
PT c/o diffuse upper abd pain since march. Has seen his pmd many times for same without any findings on imaging or testing, per pt. States he is unable to sleep with pain tonight.

## 2021-01-16 DIAGNOSIS — R79.89 LOW VITAMIN D LEVEL: Primary | ICD-10-CM

## 2021-01-16 RX ORDER — ERGOCALCIFEROL 1.25 MG/1
50000 CAPSULE ORAL WEEKLY
Qty: 5 CAPSULE | Refills: 2 | Status: SHIPPED | OUTPATIENT
Start: 2021-01-16 | End: 2021-02-15

## 2021-01-18 ENCOUNTER — OFFICE VISIT (OUTPATIENT)
Dept: FAMILY MEDICINE CLINIC | Facility: CLINIC | Age: 31
End: 2021-01-18
Payer: MEDICAID

## 2021-01-18 VITALS
HEIGHT: 70 IN | HEART RATE: 81 BPM | DIASTOLIC BLOOD PRESSURE: 76 MMHG | WEIGHT: 315 LBS | BODY MASS INDEX: 45.1 KG/M2 | SYSTOLIC BLOOD PRESSURE: 149 MMHG

## 2021-01-18 DIAGNOSIS — L98.491 CALLOUS ULCER, LIMITED TO BREAKDOWN OF SKIN (HCC): ICD-10-CM

## 2021-01-18 DIAGNOSIS — R74.8 ELEVATED ALKALINE PHOSPHATASE LEVEL: ICD-10-CM

## 2021-01-18 DIAGNOSIS — R10.11 RUQ PAIN: Primary | ICD-10-CM

## 2021-01-18 PROCEDURE — 3078F DIAST BP <80 MM HG: CPT | Performed by: FAMILY MEDICINE

## 2021-01-18 PROCEDURE — 3077F SYST BP >= 140 MM HG: CPT | Performed by: FAMILY MEDICINE

## 2021-01-18 PROCEDURE — 3008F BODY MASS INDEX DOCD: CPT | Performed by: FAMILY MEDICINE

## 2021-01-18 PROCEDURE — 99214 OFFICE O/P EST MOD 30 MIN: CPT | Performed by: FAMILY MEDICINE

## 2021-01-18 NOTE — PROGRESS NOTES
HPI:    Patient ID: Boy Weinstein is a 31year old male. HPI  Patient presents with:  Lab Results: follow up for lab results   Test Results: from MRI done at ER at [facility] for abdominal pain   Pain improved now. No etiology.  10 months of intermittent righ BRUSH ON TEETH IN EVENING BEFORE BED AND EXPETORATE. DO NOT RINSE  5   • gabapentin 300 MG Oral Cap TAKE 1 CAPSULE BY MOUTH TWICE DAILY  0   • Loperamide HCl (IMODIUM A-D OR) Take by mouth daily.      • Fluticasone Furoate-Vilanterol (BREO ELLIPTA) 200-25 M

## 2021-02-08 ENCOUNTER — OFFICE VISIT (OUTPATIENT)
Dept: PODIATRY CLINIC | Facility: CLINIC | Age: 31
End: 2021-02-08
Payer: MEDICAID

## 2021-02-08 DIAGNOSIS — L84 FOOT CALLUS: Primary | ICD-10-CM

## 2021-02-08 PROCEDURE — 99242 OFF/OP CONSLTJ NEW/EST SF 20: CPT | Performed by: PODIATRIST

## 2021-02-08 NOTE — PROGRESS NOTES
HPI:    Patient ID: Sal Parr is a 31year old male. Is an 50-year-old male presents as new patient to me on consult from [place]. Patient's complaint is a callused area on the left heel. Its been present for the last couple of years.   Sometimes EXPETORATE. DO NOT RINSE  5   • gabapentin 300 MG Oral Cap TAKE 1 CAPSULE BY MOUTH TWICE DAILY  0   • Loperamide HCl (IMODIUM A-D OR) Take by mouth daily.      • Fluticasone Furoate-Vilanterol (BREO ELLIPTA) 200-25 MCG/INH Inhalation Aerosol Powder, Breath EF#[id number]

## 2021-02-21 ENCOUNTER — APPOINTMENT (OUTPATIENT)
Dept: GENERAL RADIOLOGY | Age: 31
End: 2021-02-21
Attending: NURSE PRACTITIONER
Payer: MEDICAID

## 2021-02-21 ENCOUNTER — HOSPITAL ENCOUNTER (OUTPATIENT)
Age: 31
Discharge: HOME | End: 2021-02-21
Payer: MEDICAID

## 2021-02-21 VITALS
SYSTOLIC BLOOD PRESSURE: 158 MMHG | HEIGHT: 71 IN | OXYGEN SATURATION: 96 % | TEMPERATURE: 98 F | BODY MASS INDEX: 44.1 KG/M2 | DIASTOLIC BLOOD PRESSURE: 80 MMHG | WEIGHT: 315 LBS | RESPIRATION RATE: 20 BRPM | HEART RATE: 94 BPM

## 2021-02-21 DIAGNOSIS — Z20.822 LAB TEST NEGATIVE FOR COVID-19 VIRUS: Primary | ICD-10-CM

## 2021-02-21 DIAGNOSIS — F41.9 ANXIETY: ICD-10-CM

## 2021-02-21 DIAGNOSIS — R09.81 NASAL CONGESTION: ICD-10-CM

## 2021-02-21 LAB — SARS-COV-2 RNA RESP QL NAA+PROBE: NOT DETECTED

## 2021-02-21 PROCEDURE — 71046 X-RAY EXAM CHEST 2 VIEWS: CPT | Performed by: NURSE PRACTITIONER

## 2021-02-21 PROCEDURE — 93000 ELECTROCARDIOGRAM COMPLETE: CPT | Performed by: NURSE PRACTITIONER

## 2021-02-21 PROCEDURE — U0002 COVID-19 LAB TEST NON-CDC: HCPCS | Performed by: NURSE PRACTITIONER

## 2021-02-21 PROCEDURE — 99214 OFFICE O/P EST MOD 30 MIN: CPT | Performed by: NURSE PRACTITIONER

## 2021-02-21 RX ORDER — LORAZEPAM 1 MG/1
1 TABLET ORAL NIGHTLY
Qty: 5 TABLET | Refills: 0 | Status: SHIPPED | OUTPATIENT
Start: 2021-02-21 | End: 2021-02-28

## 2021-02-21 NOTE — ED INITIAL ASSESSMENT (HPI)
Complains of not being able to sleep last night- feels he is having an  aniexty  Issue with cough sneezing no fever. Easy non labored resp mmm.

## 2021-02-21 NOTE — ED PROVIDER NOTES
Patient presents with:  Cough/URI  Covid-19 Test      HPI:     Alda Guadalupe is a 31year old male who presents for evaluation and management of a chief complaint of nasal congestion and dry cough for the past couple days.   The patient states he would [facility] Inability: Not on file      Transportation needs        Medical: Not on file        Non-medical: Not on file    Tobacco Use      Smoking status: Never Smoker      Smokeless tobacco: Never Used    Substance and Sexual Activity      Alcohol use: Never BMI 51.88 kg/m²   GENERAL: well developed, well nourished, well hydrated, no distress  SKIN: good skin turgor, no obvious rashes  NECK: supple, no adenopathy. No neck stiffness.    CARDIO: RRR without murmur  EXTREMITIES: no cyanosis, edema, TERAN without dif acute intrathoracic process. Dictated by (CST): Shira Brewer MD on 2/21/2021 at 8:40 AM     Finalized by (CST): Shira Brewer MD on 2/21/2021 at 8:42 AM          The patient's Covid test is negative. His chest x-ray is negative.   We discussed cont

## 2021-03-08 ENCOUNTER — OFFICE VISIT (OUTPATIENT)
Dept: GASTROENTEROLOGY | Facility: CLINIC | Age: 31
End: 2021-03-08
Payer: MEDICAID

## 2021-03-08 ENCOUNTER — TELEPHONE (OUTPATIENT)
Dept: GASTROENTEROLOGY | Facility: CLINIC | Age: 31
End: 2021-03-08

## 2021-03-08 VITALS
HEIGHT: 71 IN | HEART RATE: 103 BPM | SYSTOLIC BLOOD PRESSURE: 137 MMHG | WEIGHT: 315 LBS | DIASTOLIC BLOOD PRESSURE: 81 MMHG | BODY MASS INDEX: 44.1 KG/M2

## 2021-03-08 DIAGNOSIS — K21.9 GASTROESOPHAGEAL REFLUX DISEASE WITHOUT ESOPHAGITIS: Primary | ICD-10-CM

## 2021-03-08 DIAGNOSIS — K21.9 GASTROESOPHAGEAL REFLUX DISEASE, UNSPECIFIED WHETHER ESOPHAGITIS PRESENT: Primary | ICD-10-CM

## 2021-03-08 DIAGNOSIS — K58.0 IRRITABLE BOWEL SYNDROME WITH DIARRHEA: ICD-10-CM

## 2021-03-08 PROCEDURE — 3075F SYST BP GE 130 - 139MM HG: CPT | Performed by: INTERNAL MEDICINE

## 2021-03-08 PROCEDURE — 99214 OFFICE O/P EST MOD 30 MIN: CPT | Performed by: INTERNAL MEDICINE

## 2021-03-08 PROCEDURE — 3079F DIAST BP 80-89 MM HG: CPT | Performed by: INTERNAL MEDICINE

## 2021-03-08 PROCEDURE — 3008F BODY MASS INDEX DOCD: CPT | Performed by: INTERNAL MEDICINE

## 2021-03-08 NOTE — PROGRESS NOTES
[address] Gastroenterology  Clinic Follow-up Visit    Patient presents with:  Abdominal Pain        Subjective/HPI:   Surjit Willoughby is a 31year old patient of Dr. Liliane Silver with history of anxiety, ADHD, biopolar and IBS and reflux, who presents fo imodium     Recommend:  Obtain records from prior GI-- (per patient stool test 48 collection, breath test, labs etc.)  PPI recommended but will take as needed  Does not want colonoscopy given workup was done and age.  No weight loss or blood in the stool  D and foods that incite symptoms   3.  Await pathology and repeat EGD in 8 weeks for healing     >>>If tissue was sampled/removed and you have not received your pathology results either by phone or letter within 2 weeks, please call our office at [phone] History: ============================================================================  Grandfather paternal with diverticulitis and colon CA   No liver disease     Prior endoscopies:  No EGD or colonoscopy done     Soc:  Denies smoking  Denies alcohol  Jaquita Cipro Tiffany INHALE 2 PUFFS BY MOUTH INTO THE LUNGS ONCE DAILY 1 Inhaler 5   • QUEtiapine Fumarate 100 MG Oral Tab Take 100 mg by mouth nightly. • Omeprazole 40 MG Oral Capsule Delayed Release Take 1 capsule (40 mg total) by mouth daily.  Take 1 capsule by mout oz (169.9 kg).     Gen: patient appears comfortable and in no acute distress  HEENT: conjunctiva pink, the sclera appears anicteric, oropharynx clear, mucus membranes appear moist  CV: regular rate and rhythm, the extremities are warm and well perfused   Kallie bedtime/dinner  Does not want colonoscopy given workup was done and age. No weight loss or blood in the stool. Afraid of the prep.   Discussed EGD again and now ready (did postpone due to COVID)  Low FODMAP diet, not interested at this time  Reglan or metoc encounter.       Meds This Visit:  Requested Prescriptions      No prescriptions requested or ordered in this encounter       Imaging & Referrals:  None

## 2021-03-08 NOTE — PATIENT INSTRUCTIONS
-Schedule EGD w/ possible biopsy/dilation w/ MAC sedation    ** If MAC @ [facility]/NE:    - HOLD ACE/ARBs the night before and/or the day of the procedure(s) - N/A   - NO alcohol, recreational drugs nor erectile dysfunction mediations 24 hours before procedure(s

## 2021-03-08 NOTE — TELEPHONE ENCOUNTER
Scheduled for:  Egd with possible dilation 88981  Provider Name:  Jarrett Tabor  Date:  4/15/21  Location:  [facility]  Sedation:  Mac  Time:  11:30 Am , (pt is aware to arrive at 10:30 Am )  Prep:  Npo 3 hrs before procedure  Meds/Allergies Reconciled?:  Physician re

## 2021-03-16 PROBLEM — R45.851 SUICIDAL IDEATION: Status: ACTIVE | Noted: 2021-03-16

## 2021-03-16 PROBLEM — F41.1 GENERALIZED ANXIETY DISORDER: Status: ACTIVE | Noted: 2021-03-16

## 2021-03-16 PROBLEM — F31.62 MODERATE MIXED BIPOLAR I DISORDER (HCC): Status: ACTIVE | Noted: 2021-03-16

## 2021-03-16 NOTE — CONSULTS
[facility]    Report of Consultation    Radha Carroll Patient Status:  Emergency    1990 MRN [id number]   Location [address] Attending No att. providers found   Hosp Day # 0 PCP Desean Zabala DO allergy and he decided to go back home.   Patient reported that his thought racing has been intrusive fight and he finding himself jumping out of his a skin and able to relax and able to sleep and have intense fear with suicidal ideation with no plan or int History  Social History    Tobacco Use      Smoking status: Never Smoker      Smokeless tobacco: Never Used    Vaping Use      Vaping Use: Never used    Alcohol use: Never    Drug use: Never          Current Medications:  No current facility-administered m labile and anxious affect congruent with the mood. Thought to process noticeably distracted. Thought content patient reported that at 1 point in his anxiety attack had suicidal ideation otherwise he is not currently suicidal and contract for safety.   Yuliet Hernandez Prescriptions     Signed Prescriptions Disp Refills   • Lurasidone HCl (LATUDA) 20 MG Oral Tab 30 tablet 0     Sig: Take 1 tablet (20 mg total) by mouth daily with dinner.            Sawyer Ochoa MD  3/16/2021

## 2021-03-16 NOTE — ED NOTES
Mother, Yomi Berumen, [phone], present at pt bedside. Patient states he feels comfortable if his mother comes and stays with him at his house.  Patient states he feels better with this plan and he would want to stay with his mother but she has a lot

## 2021-03-16 NOTE — ED INITIAL ASSESSMENT (HPI)
Pt presents to ED for shortness of breath x 3days and anxiety. Pt states he does not feel safe at home due to being alone and states he jumps at every shadow. Pt states he usually lives with his father but his father is out of town.  Pt denies SI but states
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
## 2021-03-16 NOTE — ED NOTES
Dr Angel Tijerina met with pt and mother for consult. No safety concerns, writer provided pt with outpatient referrals per request. Safety plan done with pt. Scanned into chart.

## 2021-03-16 NOTE — ED PROVIDER NOTES
Patient Seen in: [facility] AND [facility] Emergency Department      History   Patient presents with:  Eval-P  Anxiety/Panic attack    Stated Complaint:     HPI/Subjective:   HPI    21-year-old male with history of bipolar disorder, ADHD, hypertension, here For for abdominal pain. Genitourinary: Negative for dysuria. Musculoskeletal: Negative for back pain. Skin: Negative for rash. Neurological: Negative for dizziness. Psychiatric/Behavioral: Negative for self-injury and suicidal ideas.  The patient is n rate of 100 bpm.     My interpretation is   abnormal for rate   Normal for rhythm    Pulse Oximeter:  Pulse oximetry on room air is 96%, indicating adequate oxygenation.     PROCEDURES:  none    DIAGNOSTICS:   Labs:  Recent Results (from the past 24 hour(s) Monocyte Absolute 0.55 0.10 - 1.00 x10(3) uL    Eosinophil Absolute 0.06 0.00 - 0.70 x10(3) uL    Basophil Absolute 0.01 0.00 - 0.20 x10(3) uL    Immature Granulocyte Absolute 0.03 0.00 - 1.00 x10(3) uL    Neutrophil % 78.9 %    Lymphocyte % 14.5 %    Mono List

## 2021-03-16 NOTE — ED NOTES
Report received from Son, [facility]. Patient sitting up in chair watching TV awaiting breakfast tray. Patient appears anxious--ER-T at bedside for continued seclusion.

## 2021-03-16 NOTE — ED NOTES
Belongings given to public safety. Patient denies SI/HI states he is extremely anxious about being home alone. Patient's father out of town on Sunday and patient has been staying with his mother.  Patient states he can't stay with his mother anymore due to

## 2021-03-16 NOTE — ED PROVIDER NOTES
Signout from Dr. Bennett Bound pending possible psych transfer. Mom is concerned about patient safety. However at this time mom states she feels comfortable taking the patient home.   The patient was seen by the psychiatric liaison Lonnie Puentes as well as Shalom Celeste

## 2021-04-05 ENCOUNTER — IMMUNIZATION (OUTPATIENT)
Dept: LAB | Facility: HOSPITAL | Age: 31
End: 2021-04-05
Attending: HOSPITALIST
Payer: MEDICAID

## 2021-04-05 DIAGNOSIS — Z23 NEED FOR VACCINATION: Primary | ICD-10-CM

## 2021-04-05 PROCEDURE — 0011A SARSCOV2 VAC 100MCG/0.5ML IM: CPT

## 2021-04-12 ENCOUNTER — LAB ENCOUNTER (OUTPATIENT)
Dept: LAB | Age: 31
End: 2021-04-12
Attending: INTERNAL MEDICINE
Payer: MEDICAID

## 2021-04-12 DIAGNOSIS — Z01.818 PRE-OP TESTING: ICD-10-CM

## 2021-04-14 ENCOUNTER — TELEPHONE (OUTPATIENT)
Dept: GASTROENTEROLOGY | Facility: CLINIC | Age: 31
End: 2021-04-14

## 2021-04-14 DIAGNOSIS — K21.9 GASTROESOPHAGEAL REFLUX DISEASE, UNSPECIFIED WHETHER ESOPHAGITIS PRESENT: Primary | ICD-10-CM

## 2021-04-14 NOTE — TELEPHONE ENCOUNTER
pt. states that he is not feeling well, and his having symptoms, of body aches, chills, coughing and tiredness, should tomorrows proc be resched?

## 2021-04-14 NOTE — TELEPHONE ENCOUNTER
GI Schedulers-    Please re-schedule patient for EGD with possible dilation (refer to TE 03/08/2021), as patient is not feeling well enough to proceed with procedure tomorrow.      Removed procedure from [facility] appt desk and submitted surgical cancel request.

## 2021-05-03 ENCOUNTER — IMMUNIZATION (OUTPATIENT)
Dept: LAB | Facility: HOSPITAL | Age: 31
End: 2021-05-03
Attending: EMERGENCY MEDICINE
Payer: MEDICAID

## 2021-05-03 DIAGNOSIS — Z23 NEED FOR VACCINATION: Primary | ICD-10-CM

## 2021-05-03 PROCEDURE — 0012A SARSCOV2 VAC 100MCG/0.5ML IM: CPT

## 2021-05-28 RX ORDER — TIOTROPIUM BROMIDE INHALATION SPRAY 1.56 UG/1
2 SPRAY, METERED RESPIRATORY (INHALATION) DAILY
Qty: 1 EACH | Refills: 5 | Status: SHIPPED | OUTPATIENT
Start: 2021-05-28 | End: 2021-11-30

## 2021-06-04 NOTE — TELEPHONE ENCOUNTER
Spoke with patient today and he stated \"that he doesn't have time to schedule the procedure in the foreseeable future as he has a full time job.  He stated that he will call back to schedule the procedure if he has any time but unlikely\"     TE will be cl

## 2021-06-30 ENCOUNTER — HOSPITAL ENCOUNTER (OUTPATIENT)
Age: 31
Discharge: HOME | End: 2021-06-30
Payer: MEDICAID

## 2021-06-30 VITALS
OXYGEN SATURATION: 95 % | HEIGHT: 70 IN | WEIGHT: 315 LBS | TEMPERATURE: 98 F | SYSTOLIC BLOOD PRESSURE: 124 MMHG | RESPIRATION RATE: 16 BRPM | HEART RATE: 94 BPM | BODY MASS INDEX: 45.1 KG/M2 | DIASTOLIC BLOOD PRESSURE: 67 MMHG

## 2021-06-30 DIAGNOSIS — R11.2 NAUSEA VOMITING AND DIARRHEA: Primary | ICD-10-CM

## 2021-06-30 DIAGNOSIS — R19.7 NAUSEA VOMITING AND DIARRHEA: Primary | ICD-10-CM

## 2021-06-30 LAB
POCT INFLUENZA A: NEGATIVE
POCT INFLUENZA B: NEGATIVE

## 2021-06-30 PROCEDURE — 99213 OFFICE O/P EST LOW 20 MIN: CPT | Performed by: NURSE PRACTITIONER

## 2021-06-30 PROCEDURE — 87502 INFLUENZA DNA AMP PROBE: CPT | Performed by: NURSE PRACTITIONER

## 2021-06-30 NOTE — ED PROVIDER NOTES
Patient Seen in: [facility]      History   Patient presents with:  Vomiting    Stated Complaint: COUGH NAUSEA VOMITING    HPI/Subjective:   Radha Carroll is a 31year-old male who presents to the Immediate Care complaining of nausea, vomitin °C)   Temp src Temporal   SpO2 95 %   O2 Device        Current:/67   Pulse 94   Temp 97.9 °F (36.6 °C) (Temporal)   Resp 16   Ht 177.8 cm (5' 10\")   Wt (!) 168.7 kg   SpO2 95%   BMI 53.38 kg/m²         Physical Exam  Vitals and nursing note reviewed Judgment normal.           ED Course     Labs Reviewed   POCT FLU TEST     Results for orders placed or performed in visit on 04/12/21   SARS-COV-2 BY PCR (KRISTI)    Collection Time: 04/12/21  1:01 PM    Specimen: Nares;  Other   Result Value Ref Range

## 2021-07-12 ENCOUNTER — OFFICE VISIT (OUTPATIENT)
Dept: FAMILY MEDICINE CLINIC | Facility: CLINIC | Age: 31
End: 2021-07-12
Payer: MEDICAID

## 2021-07-12 VITALS
HEIGHT: 70 IN | HEART RATE: 94 BPM | SYSTOLIC BLOOD PRESSURE: 125 MMHG | DIASTOLIC BLOOD PRESSURE: 81 MMHG | BODY MASS INDEX: 45.1 KG/M2 | WEIGHT: 315 LBS

## 2021-07-12 DIAGNOSIS — M77.11 LATERAL EPICONDYLITIS OF RIGHT ELBOW: Primary | ICD-10-CM

## 2021-07-12 PROCEDURE — 3008F BODY MASS INDEX DOCD: CPT | Performed by: FAMILY MEDICINE

## 2021-07-12 PROCEDURE — 3079F DIAST BP 80-89 MM HG: CPT | Performed by: FAMILY MEDICINE

## 2021-07-12 PROCEDURE — 99214 OFFICE O/P EST MOD 30 MIN: CPT | Performed by: FAMILY MEDICINE

## 2021-07-12 PROCEDURE — 3074F SYST BP LT 130 MM HG: CPT | Performed by: FAMILY MEDICINE

## 2021-07-12 RX ORDER — NAPROXEN 500 MG/1
500 TABLET ORAL 2 TIMES DAILY
Qty: 60 TABLET | Refills: 3 | Status: SHIPPED | OUTPATIENT
Start: 2021-07-12 | End: 2022-07-07

## 2021-07-12 NOTE — PROGRESS NOTES
HPI/Subjective:   Patient ID: Orlando Mejia is a 31year old male.     HPI  Patient presents with:  Elbow Pain: RT elbow pain radiating to wrist,  started  2 days ago, using ibphrophen, no sleep     History/Other:   Review of Systems   Constitutional: Norma Heaton Inhalation Aerosol Powder, Breath Activated Inhale 1 puff into the lungs daily. 1 each 5   • ergocalciferol 60534 units Oral Cap once a week.    0   • Lurasidone HCl (LATUDA) 20 MG Oral Tab Take 1 tablet (20 mg total) by mouth daily with dinner.  30 tablet

## 2021-07-20 ENCOUNTER — NURSE TRIAGE (OUTPATIENT)
Dept: FAMILY MEDICINE CLINIC | Facility: CLINIC | Age: 31
End: 2021-07-20

## 2021-07-20 ENCOUNTER — PATIENT MESSAGE (OUTPATIENT)
Dept: FAMILY MEDICINE CLINIC | Facility: CLINIC | Age: 31
End: 2021-07-20

## 2021-07-20 NOTE — TELEPHONE ENCOUNTER
Action Requested: Summary for Provider     []  Critical Lab, Recommendations Needed  [] Need Additional Advice  []   FYI    []   Need Orders  [] Need Medications Sent to Pharmacy  []  Other     SUMMARY:   Please see [email or web address] message below.   The patient woul

## 2021-07-20 NOTE — TELEPHONE ENCOUNTER
From: Sharmaine Telles  To: Mabel Velázquez DO  Sent: 7/20/2021 1:03 AM CDT  Subject: Visit Follow-up Question    Dr. Cruzito Fernandez,    I had a question. Is there anything you can give me for pain that'd be less rough on my stomach than 500mg of Naproxen?  I ask b

## 2021-07-20 NOTE — TELEPHONE ENCOUNTER
Please call patient regarding [employer] message copied here since mentions stomach bleed. [employer] message response sent in original MyChart encounter, per department process. ----- [facility] from [facility]riam [facility] sent at 7/20/2021  1:03 AM CDT -----  Regarding

## 2021-07-20 NOTE — TELEPHONE ENCOUNTER
Patient calling to give an update for pcp. states he has been to the bathroom several times and had no blood. Thinks he \"knicked\" him self and blood fell into the toilet. Patient is asking for a \"softer pain medicine\".

## 2021-07-21 NOTE — ED PROVIDER NOTES
Patient Seen in: [facility]      History   Patient presents with:  Fatigue    Stated Complaint: trouble sleeping x 3 days    HPI/Subjective:   Raji Telles 69-year-old male presenting to the immediate care complaining of difficulty sleeping Device None (Room air)       Current:/60   Pulse 80   Temp 97.9 °F (36.6 °C) (Temporal)   Resp 20   Ht 177.8 cm (5' 10\")   Wt (!) 170.6 kg   SpO2 99%   BMI 53.95 kg/m²         Physical Exam  Vitals and nursing note reviewed.    Constitutional: afebrile, nontoxic in appearance without signs of clinical deterioration.  Patient has no evidence of any emergent medical condition at this time which would warrant further evaluation or admission to the hospital. Patient will be discharged home with outpa

## 2021-08-02 ENCOUNTER — NURSE TRIAGE (OUTPATIENT)
Dept: FAMILY MEDICINE CLINIC | Facility: CLINIC | Age: 31
End: 2021-08-02

## 2021-08-02 NOTE — TELEPHONE ENCOUNTER
Action Requested: Summary for Provider     []  Critical Lab, Recommendations Needed  [x] Need Additional Advice  []   FYI    []   Need Orders  [] Need Medications Sent to Pharmacy  []  Other     SUMMARY: Dr Malinda Ashton: Please advise what OTC products you jaswinder

## 2021-08-02 NOTE — TELEPHONE ENCOUNTER
Spoke with patient, (  verified ) informed of 's  instructions below    Patient verbalizes understanding and agrees.

## 2021-08-05 RX ORDER — VALSARTAN 80 MG/1
80 TABLET ORAL
Qty: 90 TABLET | Refills: 1 | Status: SHIPPED | OUTPATIENT
Start: 2021-08-05 | End: 2022-01-31

## 2021-08-05 RX ORDER — FLUTICASONE PROPIONATE 50 MCG
SPRAY, SUSPENSION (ML) NASAL
Qty: 16 G | Refills: 5 | Status: SHIPPED | OUTPATIENT
Start: 2021-08-05 | End: 2022-01-31

## 2021-08-05 NOTE — TELEPHONE ENCOUNTER
Refill passed per [facility], [facility] protocol. Requested Prescriptions   Pending Prescriptions Disp Refills    Fluticasone Propionate 50 MCG/ACT Nasal Suspension 16 g 5     Sig: USE 2 SPRAY(S) IN EACH NOSTRIL ONCE DAILY FOR 30 DAYS        Allergy Medication Protocol Passed - 8/5/2021  2:48 AM        Passed - Appoinment in past 12 or next 3 months               Recent Outpatient Visits              3 weeks ago Lateral epicondylitis of right elbow    [facility], [facility], [address] [facility], [address] [place] [address], DO    Office Visit    5 months ago Gastroesophageal reflux disease without esophagitis    [facility], [facility], [address] [facility], KJ Zuleta MD    Office Visit    5 months ago Foot callus    [facility], [address], [address], Earshaista Daughters, DPM    Office Visit    6 months ago RUQ pain    [facility], [address] [facility], Irvinekarley Carbone,     Office Visit    7 months ago Routine general medical examination at a health care facility    [facility], [facility], [address] [place] [address] Visit            Future Appointments         Provider Department Appt Notes    In 4 days Rolin Mcardle, [address] in [place]?   [address]    In 6 days Rolin Mcardle, OT [employer] in [facility]     In 1 week Rolin Mcardle, [address] in [facility]     In 2 weeks Rolin Mcardle, [address] in [facility]     In 3 weeks Rolin Mcardle, [address] in [facility]     In 3 weeks Rolin Mcardle, [address] in [facility]     In 1 month Rolin Mcardle, [address] in [facility]     In 1 month Rolin Mcardle, [address] in [facility]     In 1 month Rolin Mcardle, [address] in [facility]

## 2021-08-05 NOTE — TELEPHONE ENCOUNTER
Refill passed per [place] [facility] [place], [facility] protocol. Requested Prescriptions   Pending Prescriptions Disp Refills    valsartan 80 MG Oral Tab 30 tablet 5     Sig: Take 1 tablet (80 mg total) by mouth once daily.         Hypertensive Medications Protocol Passed - 8/

## 2021-08-09 ENCOUNTER — OFFICE VISIT (OUTPATIENT)
Dept: PHYSICAL THERAPY | Age: 31
End: 2021-08-09
Attending: FAMILY MEDICINE
Payer: MEDICAID

## 2021-08-09 ENCOUNTER — TELEPHONE (OUTPATIENT)
Dept: FAMILY MEDICINE CLINIC | Facility: CLINIC | Age: 31
End: 2021-08-09

## 2021-08-09 ENCOUNTER — APPOINTMENT (OUTPATIENT)
Dept: PHYSICAL THERAPY | Age: 31
End: 2021-08-09
Attending: FAMILY MEDICINE
Payer: MEDICAID

## 2021-08-09 DIAGNOSIS — M77.11 LATERAL EPICONDYLITIS OF RIGHT ELBOW: ICD-10-CM

## 2021-08-09 DIAGNOSIS — M77.11 LATERAL EPICONDYLITIS OF RIGHT ELBOW: Primary | ICD-10-CM

## 2021-08-09 PROCEDURE — 97110 THERAPEUTIC EXERCISES: CPT | Performed by: OCCUPATIONAL THERAPIST

## 2021-08-09 PROCEDURE — 97165 OT EVAL LOW COMPLEX 30 MIN: CPT | Performed by: OCCUPATIONAL THERAPIST

## 2021-08-09 NOTE — TELEPHONE ENCOUNTER
Jonelle/Lombard [phone] states that the patient has an appointment today at 6:30 and they would like the order to be changed from PT to OT.

## 2021-08-09 NOTE — TELEPHONE ENCOUNTER
Britt Mooney from rehab in [address] [place] [address] need new occupational order for [facility]. 11. Has evaluation tonight.  Please advise

## 2021-08-09 NOTE — PROGRESS NOTES
OCCUPATIONAL THERAPY UPPER EXTREMITY EVALUATION   Referring Physician: Dr. Adriana Her  Diagnosis: Lateral epicondylitis of right elbow (M77.11)      Date of onset: jan 2021 Date of Service: 8/9/2021       PATIENT SUMMARY   Winnie Doyle is a 31year old y/o pronation 90    Forearm supination 85               MANUAL MUSCLE TESTING:     Strength (lbs) Right            Trial          1           2            3          Average Left               Trial           1           2            3          Average   : (avg) In order to increase ease with opening jarlids and bottle caps. Frequency / Duration: Patient will be seen for 2 x/week for 4-6 weeks visits over a 90 day period.  Treatment will include: Manual Therapy, Soft tissue mobilization, AROM, PROM, Streng

## 2021-08-11 ENCOUNTER — APPOINTMENT (OUTPATIENT)
Dept: PHYSICAL THERAPY | Age: 31
End: 2021-08-11
Attending: FAMILY MEDICINE
Payer: MEDICAID

## 2021-08-11 ENCOUNTER — TELEPHONE (OUTPATIENT)
Dept: PHYSICAL THERAPY | Facility: HOSPITAL | Age: 31
End: 2021-08-11

## 2021-08-16 ENCOUNTER — APPOINTMENT (OUTPATIENT)
Dept: PHYSICAL THERAPY | Age: 31
End: 2021-08-16
Attending: FAMILY MEDICINE
Payer: MEDICAID

## 2021-08-16 ENCOUNTER — OFFICE VISIT (OUTPATIENT)
Dept: PHYSICAL THERAPY | Age: 31
End: 2021-08-16
Attending: FAMILY MEDICINE
Payer: MEDICAID

## 2021-08-16 PROCEDURE — 97140 MANUAL THERAPY 1/> REGIONS: CPT | Performed by: OCCUPATIONAL THERAPIST

## 2021-08-16 PROCEDURE — 97110 THERAPEUTIC EXERCISES: CPT | Performed by: OCCUPATIONAL THERAPIST

## 2021-08-16 NOTE — PROGRESS NOTES
Dx: Lateral epicondylitis of right elbow (M77.11)            Authorized # of Visits/Insurance:  [facility]- 8 visits       Next MD visit: none scheduled  Fall Risk: standard         Precautions: n/a           Medication Changes since last visit?: No  Sub at worst to 0-1/10 in order to return to ADL's with greater ease. 3. AROM into wrist flexion with the elbow extended will increase to  60 degrees in order to facilitate ease with cleaning tasks at home.   4.  strength in the provocative position (elbow

## 2021-08-18 ENCOUNTER — TELEPHONE (OUTPATIENT)
Dept: OCCUPATIONAL MEDICINE | Age: 31
End: 2021-08-18

## 2021-08-18 ENCOUNTER — APPOINTMENT (OUTPATIENT)
Dept: PHYSICAL THERAPY | Age: 31
End: 2021-08-18
Attending: FAMILY MEDICINE
Payer: MEDICAID

## 2021-08-23 ENCOUNTER — APPOINTMENT (OUTPATIENT)
Dept: PHYSICAL THERAPY | Age: 31
End: 2021-08-23
Attending: FAMILY MEDICINE
Payer: MEDICAID

## 2021-08-23 ENCOUNTER — OFFICE VISIT (OUTPATIENT)
Dept: PHYSICAL THERAPY | Age: 31
End: 2021-08-23
Attending: FAMILY MEDICINE
Payer: MEDICAID

## 2021-08-23 PROCEDURE — 97110 THERAPEUTIC EXERCISES: CPT | Performed by: OCCUPATIONAL THERAPIST

## 2021-08-23 PROCEDURE — 97140 MANUAL THERAPY 1/> REGIONS: CPT | Performed by: OCCUPATIONAL THERAPIST

## 2021-08-23 NOTE — PROGRESS NOTES
Dx: Lateral epicondylitis of right elbow (M77.11)            Authorized # of Visits/Insurance:  [facility]- 8 visits       Next MD visit: none scheduled  Fall Risk: standard         Precautions: n/a           Medication Changes since last visit?: No  Sub in OT.  2. Pt complaints of pain will decrease at worst to 0-1/10 in order to return to ADL's with greater ease. 3. AROM into wrist flexion with the elbow extended will increase to  60 degrees in order to facilitate ease with cleaning tasks at home.   4. G

## 2021-08-26 ENCOUNTER — APPOINTMENT (OUTPATIENT)
Dept: PHYSICAL THERAPY | Age: 31
End: 2021-08-26
Attending: FAMILY MEDICINE
Payer: MEDICAID

## 2021-08-30 ENCOUNTER — TELEPHONE (OUTPATIENT)
Dept: PHYSICAL THERAPY | Facility: HOSPITAL | Age: 31
End: 2021-08-30

## 2021-08-30 ENCOUNTER — HOSPITAL ENCOUNTER (OUTPATIENT)
Age: 31
Discharge: HOME | End: 2021-08-30
Payer: MEDICAID

## 2021-08-30 ENCOUNTER — APPOINTMENT (OUTPATIENT)
Dept: PHYSICAL THERAPY | Age: 31
End: 2021-08-30
Attending: FAMILY MEDICINE
Payer: MEDICAID

## 2021-08-30 VITALS
OXYGEN SATURATION: 96 % | RESPIRATION RATE: 18 BRPM | HEART RATE: 89 BPM | HEIGHT: 71 IN | TEMPERATURE: 97 F | SYSTOLIC BLOOD PRESSURE: 142 MMHG | DIASTOLIC BLOOD PRESSURE: 64 MMHG | WEIGHT: 315 LBS | BODY MASS INDEX: 44.1 KG/M2

## 2021-08-30 DIAGNOSIS — R09.81 NASAL CONGESTION: Primary | ICD-10-CM

## 2021-08-30 PROCEDURE — 99213 OFFICE O/P EST LOW 20 MIN: CPT | Performed by: NURSE PRACTITIONER

## 2021-08-31 LAB — SARS-COV-2 RNA RESP QL NAA+PROBE: NOT DETECTED

## 2021-09-01 ENCOUNTER — APPOINTMENT (OUTPATIENT)
Dept: PHYSICAL THERAPY | Age: 31
End: 2021-09-01
Attending: FAMILY MEDICINE
Payer: MEDICAID

## 2021-09-01 ENCOUNTER — HOSPITAL ENCOUNTER (EMERGENCY)
Facility: HOSPITAL | Age: 31
Discharge: HOME | End: 2021-09-01
Attending: EMERGENCY MEDICINE
Payer: MEDICAID

## 2021-09-01 ENCOUNTER — APPOINTMENT (OUTPATIENT)
Dept: GENERAL RADIOLOGY | Facility: HOSPITAL | Age: 31
End: 2021-09-01
Attending: EMERGENCY MEDICINE
Payer: MEDICAID

## 2021-09-01 VITALS
BODY MASS INDEX: 45.1 KG/M2 | WEIGHT: 315 LBS | RESPIRATION RATE: 14 BRPM | HEART RATE: 83 BPM | TEMPERATURE: 98 F | OXYGEN SATURATION: 95 % | HEIGHT: 70 IN | SYSTOLIC BLOOD PRESSURE: 125 MMHG | DIASTOLIC BLOOD PRESSURE: 73 MMHG

## 2021-09-01 DIAGNOSIS — J45.21 EXACERBATION OF INTERMITTENT ASTHMA, UNSPECIFIED ASTHMA SEVERITY: Primary | ICD-10-CM

## 2021-09-01 PROCEDURE — 99283 EMERGENCY DEPT VISIT LOW MDM: CPT

## 2021-09-01 PROCEDURE — 71045 X-RAY EXAM CHEST 1 VIEW: CPT | Performed by: EMERGENCY MEDICINE

## 2021-09-01 RX ORDER — PREDNISONE 20 MG/1
10 TABLET ORAL DAILY
Qty: 3 TABLET | Refills: 0 | Status: SHIPPED | OUTPATIENT
Start: 2021-09-01 | End: 2021-09-06

## 2021-09-01 RX ORDER — PREDNISONE 20 MG/1
10 TABLET ORAL ONCE
Status: COMPLETED | OUTPATIENT
Start: 2021-09-01 | End: 2021-09-01

## 2021-09-01 NOTE — ED PROVIDER NOTES
Patient Seen in: [facility] AND [facility] Emergency Department      History   Patient presents with:  Difficulty Breathing    Stated Complaint: asthma attack    HPI/Subjective:   HPI    Pt is 32 yo M who p/w difficulty breathing in middle of night.  No relief wi CTAB, no wheezes or retractions  Extremities: FROM of all extremities, no cyanosis/clubbing/edema  Neuro: CN intact, normal speech, 5/5 motor strength in all extremities, no focal deficits  SKIN: warm, dry, no rashes        ED Course   Labs Reviewed - No d

## 2021-09-01 NOTE — ED PROVIDER NOTES
Patient signed out to me from previous medical team pending chest x-ray. Patient is a 79-year-old male history of asthma presenting with shortness of breath, diagnosed with mild asthma exacerbation.     XR CHEST AP PORTABLE  (CPT=71045)    Result Date:

## 2021-09-01 NOTE — ED INITIAL ASSESSMENT (HPI)
Pt was trying to sleep and felt unable to breathe, pt took 3 puffs of his rescue inhaler. Davin Bryon back asleep but was woken up again with difficulty breathing.  Drive himself in to be checked  out

## 2021-09-02 ENCOUNTER — APPOINTMENT (OUTPATIENT)
Dept: PHYSICAL THERAPY | Age: 31
End: 2021-09-02
Attending: FAMILY MEDICINE
Payer: MEDICAID

## 2021-09-02 NOTE — ED PROVIDER NOTES
Patient Seen in: [facility]      History   Patient presents with:  Fatigue  Nausea/Vomiting/Diarrhea    Stated Complaint: covid test/fatigue/vomitting/diarrhea/chills/cough    HPI/Subjective:   HPI    32 yo male arrives to the ic with request 96 %   O2 Device None (Room air)       Current:/64   Pulse 89   Temp 96.6 °F (35.9 °C) (Temporal)   Resp 18   Ht 180.3 cm (5' 11\")   Wt (!) 168.7 kg   SpO2 96%   BMI 51.88 kg/m²         Physical Exam  Constitutional:       Appearance: He is well-dev and or OTC drug management.      I spent a total of 17 minutes during chart review, obtaining history, performing a physical exam, bedside monitoring of interventions, collecting and independently interpreting tests, discussion with consultants, patient com

## 2021-09-07 ENCOUNTER — APPOINTMENT (OUTPATIENT)
Dept: PHYSICAL THERAPY | Age: 31
End: 2021-09-07
Attending: FAMILY MEDICINE
Payer: MEDICAID

## 2021-09-08 ENCOUNTER — OFFICE VISIT (OUTPATIENT)
Dept: PHYSICAL THERAPY | Age: 31
End: 2021-09-08
Attending: FAMILY MEDICINE
Payer: MEDICAID

## 2021-09-08 PROCEDURE — 97110 THERAPEUTIC EXERCISES: CPT | Performed by: OCCUPATIONAL THERAPIST

## 2021-09-08 PROCEDURE — 97140 MANUAL THERAPY 1/> REGIONS: CPT | Performed by: OCCUPATIONAL THERAPIST

## 2021-09-08 NOTE — PROGRESS NOTES
Dx: Lateral epicondylitis of right elbow (M77.11)            Authorized # of Visits/Insurance:  [facility]- 8 visits       Next MD visit: none scheduled  Fall Risk: standard         Precautions: n/a           Medication Changes since last visit?: No  Sub which he demonstrates understanding of. Plan: Continue therapy 2x/wk to increase function for ADL's. Charges: 1 MT, 2 TE       Total Timed Treatment: 45 min  Total Treatment Time: 45 min    Goals     • Therapy Goals      1.   Pt will be independent

## 2021-09-09 ENCOUNTER — APPOINTMENT (OUTPATIENT)
Dept: PHYSICAL THERAPY | Age: 31
End: 2021-09-09
Attending: FAMILY MEDICINE
Payer: MEDICAID

## 2021-09-10 RX ORDER — OMEPRAZOLE 40 MG/1
40 CAPSULE, DELAYED RELEASE ORAL DAILY
Qty: 30 CAPSULE | Refills: 11 | Status: SHIPPED | OUTPATIENT
Start: 2021-09-10

## 2021-09-10 NOTE — TELEPHONE ENCOUNTER
Requested Prescriptions     Pending Prescriptions Disp Refills   • Omeprazole 40 MG Oral Capsule Delayed Release 30 capsule 11     Sig: Take 1 capsule (40 mg total) by mouth daily.  Take 1 capsule by mouth daily before breakfast.   Lr: 09/24/2020

## 2021-09-13 ENCOUNTER — TELEPHONE (OUTPATIENT)
Dept: PHYSICAL THERAPY | Facility: HOSPITAL | Age: 31
End: 2021-09-13

## 2021-09-13 ENCOUNTER — APPOINTMENT (OUTPATIENT)
Dept: PHYSICAL THERAPY | Age: 31
End: 2021-09-13
Attending: FAMILY MEDICINE
Payer: MEDICAID

## 2021-09-15 ENCOUNTER — OFFICE VISIT (OUTPATIENT)
Dept: PHYSICAL THERAPY | Age: 31
End: 2021-09-15
Attending: FAMILY MEDICINE
Payer: MEDICAID

## 2021-09-15 PROCEDURE — 97110 THERAPEUTIC EXERCISES: CPT | Performed by: OCCUPATIONAL THERAPIST

## 2021-09-15 PROCEDURE — 97140 MANUAL THERAPY 1/> REGIONS: CPT | Performed by: OCCUPATIONAL THERAPIST

## 2021-09-15 NOTE — PROGRESS NOTES
Dx: Lateral epicondylitis of right elbow (M77.11)            Authorized # of Visits/Insurance:  [facility]- 8 visits       Next MD visit: none scheduled  Fall Risk: standard         Precautions: n/a           Medication Changes since last visit?: No  Sub Issued RTC exercises for home in order to progress with proximal strengthening which he demonstrated understanding of. Plan: Continue therapy 2x/wk to increase function for ADL's.     Charges: 1 MT, 2 TE       Total Timed Treatment: 45 min  Total Carey

## 2021-10-04 ENCOUNTER — TELEPHONE (OUTPATIENT)
Dept: FAMILY MEDICINE CLINIC | Facility: CLINIC | Age: 31
End: 2021-10-04

## 2021-10-04 ENCOUNTER — HOSPITAL ENCOUNTER (OUTPATIENT)
Age: 31
Discharge: HOME | End: 2021-10-04
Payer: MEDICAID

## 2021-10-04 VITALS
TEMPERATURE: 97 F | OXYGEN SATURATION: 97 % | DIASTOLIC BLOOD PRESSURE: 80 MMHG | HEART RATE: 92 BPM | SYSTOLIC BLOOD PRESSURE: 122 MMHG | RESPIRATION RATE: 20 BRPM

## 2021-10-04 DIAGNOSIS — G47.09 OTHER INSOMNIA: Primary | ICD-10-CM

## 2021-10-04 PROCEDURE — 99213 OFFICE O/P EST LOW 20 MIN: CPT | Performed by: NURSE PRACTITIONER

## 2021-10-04 RX ORDER — LORAZEPAM 1 MG/1
1 TABLET ORAL DAILY PRN
Qty: 5 TABLET | Refills: 0 | Status: SHIPPED | OUTPATIENT
Start: 2021-10-04

## 2021-10-04 NOTE — TELEPHONE ENCOUNTER
On call  Patient reported having significant anxiety, patient has history of bipolar disorder with associated generalized anxiety disorder, he does follow with psychiatry and is taking Seroquel and gabapentin which per patient are usually very good to cont

## 2021-10-06 NOTE — ED PROVIDER NOTES
Patient Seen in: [facility]      History   Patient presents with:  Medical Question    Stated Complaint: insomnia; nausea; anxious    Subjective:   HPI    28 yo male arrives to the ic with co insomnia for several days and increased anxiety, d above.    Physical Exam     ED Triage Vitals [10/04/21 1816]   /80   Pulse 92   Resp 20   Temp 97.3 °F (36.3 °C)   Temp src Temporal   SpO2 97 %   O2 Device None (Room air)       Current:/80   Pulse 92   Temp 97.3 °F (36.3 °C) (Temporal)   Resp their doctor as instructed. The patient verbalized understanding of the discharge instructions and plan, also including, if needed, prescription drug management and or OTC drug management.      I spent a total of 11 minutes during chart review, obtaining hi

## 2021-10-21 ENCOUNTER — TELEPHONE (OUTPATIENT)
Dept: PULMONOLOGY | Facility: CLINIC | Age: 31
End: 2021-10-21

## 2021-10-21 NOTE — TELEPHONE ENCOUNTER
Current Outpatient Medications   Medication Sig Dispense Refill   • Fluticasone Furoate-Vilanterol (BREO ELLIPTA) 200-25 MCG/INH Inhalation Aerosol Powder, Breath Activated Inhale 1 puff into the lungs daily.  1 each 5     Per pharmacy a Prior Auth is requi

## 2021-10-26 RX ORDER — BUDESONIDE AND FORMOTEROL FUMARATE DIHYDRATE 160; 4.5 UG/1; UG/1
2 AEROSOL RESPIRATORY (INHALATION) 2 TIMES DAILY
Qty: 1 EACH | Refills: 5 | Status: SHIPPED | OUTPATIENT
Start: 2021-10-26 | End: 2021-11-11 | Stop reason: ALTCHOICE

## 2021-10-26 NOTE — TELEPHONE ENCOUNTER
Spoke with pharmacy and they received prescription and confirmed that Rx is covered. They will notify patient.

## 2021-11-05 ENCOUNTER — PATIENT MESSAGE (OUTPATIENT)
Dept: PULMONOLOGY | Facility: CLINIC | Age: 31
End: 2021-11-05

## 2021-11-05 ENCOUNTER — HOSPITAL ENCOUNTER (EMERGENCY)
Facility: HOSPITAL | Age: 31
Discharge: HOME | End: 2021-11-05
Attending: EMERGENCY MEDICINE
Payer: MEDICAID

## 2021-11-05 VITALS
OXYGEN SATURATION: 96 % | RESPIRATION RATE: 22 BRPM | DIASTOLIC BLOOD PRESSURE: 92 MMHG | TEMPERATURE: 98 F | WEIGHT: 315 LBS | SYSTOLIC BLOOD PRESSURE: 155 MMHG | BODY MASS INDEX: 53 KG/M2 | HEART RATE: 103 BPM

## 2021-11-05 DIAGNOSIS — Z76.0 ENCOUNTER FOR MEDICATION REFILL: Primary | ICD-10-CM

## 2021-11-05 PROCEDURE — 99283 EMERGENCY DEPT VISIT LOW MDM: CPT

## 2021-11-05 NOTE — TELEPHONE ENCOUNTER
From: Marcos Telles  To: Noe Rock DO  Sent: 11/5/2021 6:08 AM CDT  Subject: Medication Problem    Hello.  I just now noticed my BREO was replaced with Symbicort (it was filled a few days ago but I hadn't needed to look to see what I had gotten from

## 2021-11-05 NOTE — ED PROVIDER NOTES
Patient Seen in: [facility] AND [facility] Emergency Department    History   Patient presents with:  Medication Request    Stated Complaint: medication refill/anxiety     HPI    28 yo M with PMH bipolar, ADHD, HTN, asthma presenting for evaluation of anxiety relat 10 MG Oral Tab,  Take 1 tablet (10 mg total) by mouth daily. TAKE 1 TABLET BY MOUTH ONCE DAILY FOR ALLERGY SYMPTOMS   QUEtiapine Fumarate 100 MG Oral Tab,  Take 100 mg by mouth nightly.    Simethicone (GAS-X OR),     SF 1.1 % Dental Gel,  BRUSH ON TEETH IN Nontender. Musculoskeletal: No gross deformity. Neurological: Alert. Skin: Skin is warm. Psychiatric: Cooperative. Nursing note and vitals reviewed.         ED Course   Labs Reviewed - No data to display         MDM     DIFFERENTIAL DIAGNOSIS: After

## 2021-11-11 ENCOUNTER — PATIENT MESSAGE (OUTPATIENT)
Dept: PULMONOLOGY | Facility: CLINIC | Age: 31
End: 2021-11-11

## 2021-11-11 ENCOUNTER — HOSPITAL ENCOUNTER (OUTPATIENT)
Age: 31
Discharge: HOME | End: 2021-11-11
Payer: MEDICAID

## 2021-11-11 ENCOUNTER — TELEPHONE (OUTPATIENT)
Dept: PULMONOLOGY | Facility: CLINIC | Age: 31
End: 2021-11-11

## 2021-11-11 VITALS
RESPIRATION RATE: 18 BRPM | TEMPERATURE: 97 F | HEART RATE: 91 BPM | OXYGEN SATURATION: 95 % | BODY MASS INDEX: 44.1 KG/M2 | WEIGHT: 315 LBS | HEIGHT: 71 IN | DIASTOLIC BLOOD PRESSURE: 81 MMHG | SYSTOLIC BLOOD PRESSURE: 111 MMHG

## 2021-11-11 DIAGNOSIS — R11.10 VOMITING: Primary | ICD-10-CM

## 2021-11-11 PROCEDURE — 99213 OFFICE O/P EST LOW 20 MIN: CPT | Performed by: NURSE PRACTITIONER

## 2021-11-11 PROCEDURE — U0002 COVID-19 LAB TEST NON-CDC: HCPCS | Performed by: NURSE PRACTITIONER

## 2021-11-11 NOTE — TELEPHONE ENCOUNTER
Spoke to Brooke who stated prior authorization is not needed. Spoke to [facility] pharmacist who stated [place] can be overridden and picked up today. Spoke to patient and notified him. He was pleased.  Symbicort discontinued per patient request.

## 2021-11-11 NOTE — ED INITIAL ASSESSMENT (HPI)
Patient last took Reglan at 1230 which he states helped his symptoms. He reports no vomiting episodes since.

## 2021-11-11 NOTE — TELEPHONE ENCOUNTER
----- Message from [facility].  Koki sent at 11/11/2021  2:55 AM CST -----  Regarding: Breo Refill  Since I needed to get the authorization first and was unable to use the prescription request I got from the ER I was wondering if it was possible for you to c

## 2021-11-11 NOTE — TELEPHONE ENCOUNTER
From: Apolinar Lundborg Heckert  To: Ninfa Bryant DO  Sent: 11/5/2021 6:08 AM CDT  Subject: Medication Problem    Hello.  I just now noticed my BREO was replaced with Symbicort (it was filled a few days ago but I hadn't needed to look to see what I had gotten from

## 2021-11-11 NOTE — ED PROVIDER NOTES
Patient Seen in: [facility]    History   Patient presents with:  Nausea/Vomiting/Diarrhea    Stated Complaint: nausea/vomitting/diarrhea/chills/sweats/    HPI    Pt here with c/o nausea and vomiting x 2 today 40min prior to arrival.   Pt repo dinner. cetirizine (EQ ALLERGY RELIEF, CETIRIZINE,) 10 MG Oral Tab,  Take 1 tablet (10 mg total) by mouth daily. TAKE 1 TABLET BY MOUTH ONCE DAILY FOR ALLERGY SYMPTOMS   QUEtiapine Fumarate 100 MG Oral Tab,  Take 100 mg by mouth nightly.    Simethicone (G Gastrointestinal: soft, non tender, no mass, normal bowel sounds, no swelling, no ecchymosis, no signs of injury or trauma  Neurological: II-XII grossly intact  no focal deficits  Skin: warm and dry, no rashes.   Musculoskeletal: neck is supple non tender

## 2021-11-11 NOTE — TELEPHONE ENCOUNTER
Dr. Leticia Soni would like Breo ordered again instead of Symbicort. Please review and sign pended order if agreeable.

## 2021-11-11 NOTE — TELEPHONE ENCOUNTER
From: Donte Telles  To:  Jordyn Tobar DO  Sent: 11/11/2021 2:55 AM CST  Subject: Breo Refill    Since I needed to get the authorization first and was unable to use the prescription request I got from the ER I was wondering if it was possible for you t

## 2021-11-30 RX ORDER — TIOTROPIUM BROMIDE INHALATION SPRAY 1.56 UG/1
2 SPRAY, METERED RESPIRATORY (INHALATION) DAILY
Qty: 1 EACH | Refills: 2 | Status: SHIPPED | OUTPATIENT
Start: 2021-11-30

## 2021-11-30 NOTE — TELEPHONE ENCOUNTER
Last office visit 09/24/20  Last refill: 05/28/21    Patient scheduled an appointment for 2/10/22  Patient would like a refill to hold him off until then.

## 2021-12-20 ENCOUNTER — OFFICE VISIT (OUTPATIENT)
Dept: FAMILY MEDICINE CLINIC | Facility: CLINIC | Age: 31
End: 2021-12-20
Payer: MEDICAID

## 2021-12-20 VITALS
HEART RATE: 94 BPM | DIASTOLIC BLOOD PRESSURE: 85 MMHG | BODY MASS INDEX: 44.1 KG/M2 | WEIGHT: 315 LBS | HEIGHT: 71 IN | SYSTOLIC BLOOD PRESSURE: 127 MMHG

## 2021-12-20 DIAGNOSIS — G47.9 SLEEP DISTURBANCE: Primary | ICD-10-CM

## 2021-12-20 PROCEDURE — 90686 IIV4 VACC NO PRSV 0.5 ML IM: CPT | Performed by: FAMILY MEDICINE

## 2021-12-20 PROCEDURE — 3074F SYST BP LT 130 MM HG: CPT | Performed by: FAMILY MEDICINE

## 2021-12-20 PROCEDURE — 90471 IMMUNIZATION ADMIN: CPT | Performed by: FAMILY MEDICINE

## 2021-12-20 PROCEDURE — 3079F DIAST BP 80-89 MM HG: CPT | Performed by: FAMILY MEDICINE

## 2021-12-20 PROCEDURE — 99213 OFFICE O/P EST LOW 20 MIN: CPT | Performed by: FAMILY MEDICINE

## 2021-12-20 PROCEDURE — 3008F BODY MASS INDEX DOCD: CPT | Performed by: FAMILY MEDICINE

## 2021-12-20 RX ORDER — ZOLPIDEM TARTRATE 10 MG/1
10 TABLET ORAL NIGHTLY PRN
Qty: 30 TABLET | Refills: 2 | Status: SHIPPED | OUTPATIENT
Start: 2021-12-20

## 2021-12-20 NOTE — PROGRESS NOTES
Subjective:   Patient ID: Kojo Ballard is a 27year old male.     HPI  Patient presents with:  Sleep Problem: sleeping poorly since friday of last week, was using benadryl recommened from psyciatrist now not helping     History/Other:   Review of Systems mouth daily with dinner.  30 tablet 0     Allergies:  Haldol [Haloperidol]    OTHER (SEE COMMENTS)    Comment:Cannot sleep, psychiatric problem  Mucinex Dm Maximum *    OTHER (SEE COMMENTS)    Comment:Stomach cramps  Zofran [Ondansetron]    OTHER (SEE COMME

## 2021-12-28 RX ORDER — CETIRIZINE HYDROCHLORIDE 10 MG/1
10 TABLET ORAL DAILY
Qty: 90 TABLET | Refills: 0 | Status: SHIPPED | OUTPATIENT
Start: 2021-12-28

## 2021-12-28 NOTE — TELEPHONE ENCOUNTER
Refill passed per [facility] protocol.   Refill Protocol Appointment Criteria  · Appointment scheduled in the past 12 months or in the next 3 months  Recent Outpatient Visits            1 week ago Sleep disturbance    [facility], [address]

## 2022-01-03 ENCOUNTER — TELEPHONE (OUTPATIENT)
Dept: FAMILY MEDICINE CLINIC | Facility: CLINIC | Age: 32
End: 2022-01-03

## 2022-01-03 NOTE — TELEPHONE ENCOUNTER
[employer] message sent to patient to schedule appointment. See plan of care for visit on 7/12/21    Recommend elbow strap. Start NSIAD and PT. See back if not resolved. No orders of the defined types were placed in this encounter.

## 2022-01-08 ENCOUNTER — IMMUNIZATION (OUTPATIENT)
Dept: LAB | Facility: HOSPITAL | Age: 32
End: 2022-01-08
Attending: EMERGENCY MEDICINE
Payer: MEDICAID

## 2022-01-08 DIAGNOSIS — Z23 NEED FOR VACCINATION: Primary | ICD-10-CM

## 2022-01-08 PROCEDURE — 0064A SARSCOV2 VAC 50MCG/0.25ML IM: CPT

## 2022-01-10 ENCOUNTER — OFFICE VISIT (OUTPATIENT)
Dept: FAMILY MEDICINE CLINIC | Facility: CLINIC | Age: 32
End: 2022-01-10
Payer: MEDICAID

## 2022-01-10 VITALS
SYSTOLIC BLOOD PRESSURE: 131 MMHG | HEART RATE: 111 BPM | BODY MASS INDEX: 44.1 KG/M2 | HEIGHT: 71 IN | DIASTOLIC BLOOD PRESSURE: 83 MMHG | WEIGHT: 315 LBS

## 2022-01-10 DIAGNOSIS — M25.562 ACUTE PAIN OF LEFT KNEE: ICD-10-CM

## 2022-01-10 DIAGNOSIS — M77.11 LATERAL EPICONDYLITIS OF RIGHT ELBOW: Primary | ICD-10-CM

## 2022-01-10 PROCEDURE — 3075F SYST BP GE 130 - 139MM HG: CPT | Performed by: FAMILY MEDICINE

## 2022-01-10 PROCEDURE — 3008F BODY MASS INDEX DOCD: CPT | Performed by: FAMILY MEDICINE

## 2022-01-10 PROCEDURE — 99213 OFFICE O/P EST LOW 20 MIN: CPT | Performed by: FAMILY MEDICINE

## 2022-01-10 PROCEDURE — 3079F DIAST BP 80-89 MM HG: CPT | Performed by: FAMILY MEDICINE

## 2022-01-10 NOTE — PROGRESS NOTES
Subjective:   Patient ID: Chayo Ayala is a 27year old male. HPI  Patient presents with:  Arm Pain: RT arm pain  left knee pain  No injury    History/Other:   Review of Systems   Constitutional: Negative. Musculoskeletal: Positive for arthralgias. COMMENTS)    Comment:Cannot sleep, psychiatric problem  Mucinex Dm Maximum *    OTHER (SEE COMMENTS)    Comment:Stomach cramps  Zofran [Ondansetron]    OTHER (SEE COMMENTS)    Comment:constipation    Objective:   Physical Exam  Vitals reviewed.    [facility]

## 2022-01-11 ENCOUNTER — HOSPITAL ENCOUNTER (OUTPATIENT)
Age: 32
Discharge: HOME | End: 2022-01-11
Payer: MEDICAID

## 2022-01-11 VITALS
DIASTOLIC BLOOD PRESSURE: 73 MMHG | RESPIRATION RATE: 19 BRPM | SYSTOLIC BLOOD PRESSURE: 136 MMHG | OXYGEN SATURATION: 95 % | TEMPERATURE: 97 F | HEART RATE: 108 BPM

## 2022-01-11 DIAGNOSIS — Z20.822 ENCOUNTER FOR LABORATORY TESTING FOR COVID-19 VIRUS: Primary | ICD-10-CM

## 2022-01-11 DIAGNOSIS — R11.10 VOMITING: ICD-10-CM

## 2022-01-11 LAB
POCT INFLUENZA A: NEGATIVE
POCT INFLUENZA B: NEGATIVE
SARS-COV-2 RNA RESP QL NAA+PROBE: NOT DETECTED

## 2022-01-11 PROCEDURE — 99213 OFFICE O/P EST LOW 20 MIN: CPT | Performed by: PHYSICIAN ASSISTANT

## 2022-01-11 PROCEDURE — 87502 INFLUENZA DNA AMP PROBE: CPT | Performed by: PHYSICIAN ASSISTANT

## 2022-01-11 PROCEDURE — U0002 COVID-19 LAB TEST NON-CDC: HCPCS | Performed by: PHYSICIAN ASSISTANT

## 2022-01-11 NOTE — ED INITIAL ASSESSMENT (HPI)
Pt woke up today and began vomiting. Pt states that he has body aches and a cough as well for the last 1.5 weeks. Pt is fully vaccinated and received his booster on Saturday. Pt denies nausea currently but states that his coworker has caught the flu.

## 2022-01-11 NOTE — TELEPHONE ENCOUNTER
Dr. Haja Call, patient is requesting refill for [facility],  Patient has an appointment scheduled for 2/10/22

## 2022-01-11 NOTE — ED PROVIDER NOTES
Patient Seen in: [facility]    History   Patient presents with:  Nausea/Vomiting/Diarrhea  Body ache and/or chills  Cough/URI    Stated Complaint: cough, vomitting, chills     HPI    Allyson Villasenor is a 27year old male who presents with nia breakfast.   Fluticasone Propionate 50 MCG/ACT Nasal Suspension,  USE 2 SPRAY(S) IN EACH NOSTRIL ONCE DAILY FOR 30 DAYS   valsartan 80 MG Oral Tab,  Take 1 tablet (80 mg total) by mouth once daily.    naproxen 500 MG Oral Tab,  Take 1 tablet (500 mg total) well-developed and well-nourished. No acute distress. Psychological: Alert, No abnormalities of mood, affect. Head: Normocephalic/atraumatic. Eyes: Pupils are equal round reactive to light. Conjunctiva are within normal limits.   ENT: Oropharynx is miranda for COVID-19 virus  (primary encounter diagnosis)  Vomiting    Disposition:  Discharge    Follow-up:  DO Jensen Whitley [facility] Steve  [phone]    Schedule an appointment as soon as possible for a visit in 2 days  For follow-up

## 2022-01-11 NOTE — TELEPHONE ENCOUNTER
CHRISTINA - pt already has appointment with Dr Austin Hwang on 2/10/2022 - patient requesting RN to call back to inform him when rx will be sent to pharm. Please call. Patient also requesting rx for Spiriva. Thank you.

## 2022-01-11 NOTE — TELEPHONE ENCOUNTER
Last office visit: 20  Last refill: 20   Medication Quantity Refills Start End   fluticasone furoate-vilanterol (BREO ELLIPTA) 200-25 MCG/INH Inhalation Aerosol Powder, Breath Activated () 28 each 0 2021   Sig: Himanshu Singh 1

## 2022-01-14 ENCOUNTER — HOSPITAL ENCOUNTER (EMERGENCY)
Facility: HOSPITAL | Age: 32
Discharge: HOME | End: 2022-01-14
Attending: EMERGENCY MEDICINE
Payer: MEDICAID

## 2022-01-14 VITALS
RESPIRATION RATE: 18 BRPM | WEIGHT: 315 LBS | SYSTOLIC BLOOD PRESSURE: 142 MMHG | HEIGHT: 70 IN | BODY MASS INDEX: 45.1 KG/M2 | TEMPERATURE: 97 F | DIASTOLIC BLOOD PRESSURE: 86 MMHG | HEART RATE: 86 BPM | OXYGEN SATURATION: 96 %

## 2022-01-14 DIAGNOSIS — R52 BODY ACHES: ICD-10-CM

## 2022-01-14 DIAGNOSIS — R11.0 NAUSEA: Primary | ICD-10-CM

## 2022-01-14 LAB
ANION GAP SERPL CALC-SCNC: 3 MMOL/L (ref 0–18)
BASOPHILS # BLD AUTO: 0.02 X10(3) UL (ref 0–0.2)
BASOPHILS NFR BLD AUTO: 0.2 %
BUN BLD-MCNC: 9 MG/DL (ref 7–18)
BUN/CREAT SERPL: 10.1 (ref 10–20)
CALCIUM BLD-MCNC: 9 MG/DL (ref 8.5–10.1)
CHLORIDE SERPL-SCNC: 103 MMOL/L (ref 98–112)
CO2 SERPL-SCNC: 32 MMOL/L (ref 21–32)
CREAT BLD-MCNC: 0.89 MG/DL
DEPRECATED RDW RBC AUTO: 47.1 FL (ref 35.1–46.3)
EOSINOPHIL # BLD AUTO: 0.24 X10(3) UL (ref 0–0.7)
EOSINOPHIL NFR BLD AUTO: 2.9 %
ERYTHROCYTE [DISTWIDTH] IN BLOOD BY AUTOMATED COUNT: 14.7 % (ref 11–15)
GLUCOSE BLD-MCNC: 91 MG/DL (ref 70–99)
HCT VFR BLD AUTO: 43 %
HGB BLD-MCNC: 13.7 G/DL
IMM GRANULOCYTES # BLD AUTO: 0.03 X10(3) UL (ref 0–1)
IMM GRANULOCYTES NFR BLD: 0.4 %
LYMPHOCYTES # BLD AUTO: 1.5 X10(3) UL (ref 1–4)
LYMPHOCYTES NFR BLD AUTO: 17.8 %
MCH RBC QN AUTO: 27.7 PG (ref 26–34)
MCHC RBC AUTO-ENTMCNC: 31.9 G/DL (ref 31–37)
MCV RBC AUTO: 86.9 FL
MONOCYTES # BLD AUTO: 0.53 X10(3) UL (ref 0.1–1)
MONOCYTES NFR BLD AUTO: 6.3 %
NEUTROPHILS # BLD AUTO: 6.1 X10 (3) UL (ref 1.5–7.7)
NEUTROPHILS # BLD AUTO: 6.1 X10(3) UL (ref 1.5–7.7)
NEUTROPHILS NFR BLD AUTO: 72.4 %
OSMOLALITY SERPL CALC.SUM OF ELEC: 284 MOSM/KG (ref 275–295)
PLATELET # BLD AUTO: 311 10(3)UL (ref 150–450)
POTASSIUM SERPL-SCNC: 3.8 MMOL/L (ref 3.5–5.1)
RBC # BLD AUTO: 4.95 X10(6)UL
SARS-COV-2 RNA RESP QL NAA+PROBE: NOT DETECTED
SODIUM SERPL-SCNC: 138 MMOL/L (ref 136–145)
WBC # BLD AUTO: 8.4 X10(3) UL (ref 4–11)

## 2022-01-14 PROCEDURE — 99284 EMERGENCY DEPT VISIT MOD MDM: CPT

## 2022-01-14 PROCEDURE — 85025 COMPLETE CBC W/AUTO DIFF WBC: CPT | Performed by: EMERGENCY MEDICINE

## 2022-01-14 PROCEDURE — 80048 BASIC METABOLIC PNL TOTAL CA: CPT | Performed by: EMERGENCY MEDICINE

## 2022-01-14 PROCEDURE — 96360 HYDRATION IV INFUSION INIT: CPT

## 2022-01-14 RX ORDER — LORAZEPAM 2 MG/ML
1 INJECTION INTRAMUSCULAR ONCE
Status: DISCONTINUED | OUTPATIENT
Start: 2022-01-14 | End: 2022-01-14

## 2022-01-14 RX ORDER — LORAZEPAM 1 MG/1
1 TABLET ORAL 2 TIMES DAILY PRN
Qty: 3 TABLET | Refills: 0 | Status: SHIPPED | OUTPATIENT
Start: 2022-01-14 | End: 2022-01-21

## 2022-01-14 NOTE — ED INITIAL ASSESSMENT (HPI)
Patient from home with nausea and fatigue since Monday when he was \"still recovering from my booster on Saturday. \" Denies pain, diarrhea but vomited one time earlier in the week.

## 2022-01-14 NOTE — ED PROVIDER NOTES
Patient Seen in: [facility] AND [facility] Emergency Department      History   No chief complaint on file.     Stated Complaint: nausea, fatigue, unable to sleep    Subjective:   HPI  43-year-old male with ADHD, asthma, hypertension, IBS, anxiety, presents for ev above.    Physical Exam     ED Triage Vitals [01/14/22 0951]   /90   Pulse 93   Resp 20   Temp 97.3 °F (36.3 °C)   Temp src Temporal   SpO2 96 %   O2 Device None (Room air)       Current:/86   Pulse 86   Temp 97.3 °F (36.3 °C) (Temporal)   Resp METABOLIC PANEL (8) - Normal   CBC WITH DIFFERENTIAL WITH PLATELET    Narrative: The following orders were created for panel order CBC With Differential With Platelet.   Procedure                               Abnormality         Status

## 2022-01-31 RX ORDER — FLUTICASONE PROPIONATE 50 MCG
SPRAY, SUSPENSION (ML) NASAL
Qty: 16 G | Refills: 5 | Status: SHIPPED | OUTPATIENT
Start: 2022-01-31 | End: 2022-07-13

## 2022-01-31 RX ORDER — VALSARTAN 80 MG/1
80 TABLET ORAL
Qty: 90 TABLET | Refills: 1 | Status: SHIPPED | OUTPATIENT
Start: 2022-01-31 | End: 2022-08-01

## 2022-02-01 NOTE — TELEPHONE ENCOUNTER
Refill passed per [facility] protocol. Requested Prescriptions   Pending Prescriptions Disp Refills    fluticasone propionate 50 MCG/ACT Nasal Suspension 16 g 5     Sig: USE 2 SPRAY(S) IN EACH NOSTRIL ONCE DAILY FOR 30 DAYS        Allergy Medication Protocol Passed - 1/31/2022 12:26 AM        Passed - Appoinment in past 12 or next 3 months           valsartan 80 MG Oral Tab 90 tablet 1     Sig: Take 1 tablet (80 mg total) by mouth once daily.         Hypertensive Medications Protocol Passed - 1/31/2022 12:26 AM        Passed - CMP or BMP in past 12 months        Passed - Appointment in past 6 or next 3 months        Passed - GFR Non- > 50     Lab Results   Component Value Date    GFRNAA 113 01/14/2022                      Recent Outpatient Visits              3 weeks ago Lateral epicondylitis of right elbow    [facility], [address] [place] [address] 25, DO    Office Visit    1 month ago Sleep disturbance    [facility], [address] [place] [address] 25, DO    Office Visit    4 months ago     [address] [place] [address] in [facility], [address] [place] [address]    4 months ago     [address] [place] [address] in [facility], [address] [place] [address]    5 months ago     [address] [place] [address] in [facility], [facility] [address] Visit           Future Appointments         Provider Department Appt Notes    In 1 week Amrita Rick [address]

## 2022-02-10 ENCOUNTER — OFFICE VISIT (OUTPATIENT)
Dept: PULMONOLOGY | Facility: CLINIC | Age: 32
End: 2022-02-10
Payer: MEDICAID

## 2022-02-10 VITALS
HEIGHT: 70 IN | OXYGEN SATURATION: 96 % | WEIGHT: 315 LBS | SYSTOLIC BLOOD PRESSURE: 136 MMHG | RESPIRATION RATE: 16 BRPM | DIASTOLIC BLOOD PRESSURE: 77 MMHG | BODY MASS INDEX: 45.1 KG/M2 | HEART RATE: 97 BPM

## 2022-02-10 DIAGNOSIS — J45.20 MILD INTERMITTENT EXTRINSIC ASTHMA WITHOUT COMPLICATION: Primary | ICD-10-CM

## 2022-02-10 PROCEDURE — 3075F SYST BP GE 130 - 139MM HG: CPT | Performed by: INTERNAL MEDICINE

## 2022-02-10 PROCEDURE — 99213 OFFICE O/P EST LOW 20 MIN: CPT | Performed by: INTERNAL MEDICINE

## 2022-02-10 PROCEDURE — 3078F DIAST BP <80 MM HG: CPT | Performed by: INTERNAL MEDICINE

## 2022-02-10 PROCEDURE — 3008F BODY MASS INDEX DOCD: CPT | Performed by: INTERNAL MEDICINE

## 2022-02-10 RX ORDER — TIOTROPIUM BROMIDE INHALATION SPRAY 1.56 UG/1
2 SPRAY, METERED RESPIRATORY (INHALATION) DAILY
Qty: 1 EACH | Refills: 11 | Status: SHIPPED | OUTPATIENT
Start: 2022-02-10

## 2022-02-10 NOTE — PROGRESS NOTES
Referring Physician  Owen Montero DO    History of Present Illness  Patient seen today for follow-up visit to pulmonary clinic. Asthma symptoms well controlled over the course last year. Denies significant exacerbations requiring hospitalization. No significant wheezing or cough. Compliant with Spiriva and Breo. Medications  fluticasone propionate 50 MCG/ACT Nasal Suspension, USE 2 SPRAY(S) IN EACH NOSTRIL ONCE DAILY FOR 30 DAYS, Disp: 16 g, Rfl: 5  valsartan 80 MG Oral Tab, Take 1 tablet (80 mg total) by mouth once daily. , Disp: 90 tablet, Rfl: 1  BREO ELLIPTA 200-25 MCG/INH Inhalation Aerosol Powder, Breath Activated, Inhale 1 puff by mouth once daily, Disp: 60 each, Rfl: 0  cetirizine (EQ ALLERGY RELIEF, CETIRIZINE,) 10 MG Oral Tab, Take 1 tablet (10 mg total) by mouth daily. TAKE 1 TABLET BY MOUTH ONCE DAILY FOR ALLERGY SYMPTOMS, Disp: 90 tablet, Rfl: 0  zolpidem 10 MG Oral Tab, Take 1 tablet (10 mg total) by mouth nightly as needed for Sleep., Disp: 30 tablet, Rfl: 2  Tiotropium Bromide Monohydrate (SPIRIVA RESPIMAT) 1.25 MCG/ACT Inhalation Aero Soln, Inhale 2 puffs into the lungs daily. , Disp: 1 each, Rfl: 2  LORazepam 1 MG Oral Tab, Take 1 tablet (1 mg total) by mouth daily as needed for Anxiety. , Disp: 5 tablet, Rfl: 0  Omeprazole 40 MG Oral Capsule Delayed Release, Take 1 capsule (40 mg total) by mouth daily. Take 1 capsule by mouth daily before breakfast., Disp: 30 capsule, Rfl: 11  naproxen 500 MG Oral Tab, Take 1 tablet (500 mg total) by mouth 2 (two) times daily. , Disp: 60 tablet, Rfl: 3  Lurasidone HCl (LATUDA) 20 MG Oral Tab, Take 1 tablet (20 mg total) by mouth daily with dinner., Disp: 30 tablet, Rfl: 0  QUEtiapine Fumarate 100 MG Oral Tab, Take 100 mg by mouth nightly., Disp: , Rfl:   Simethicone (GAS-X OR), , Disp: , Rfl:   SF 1.1 % Dental Gel, BRUSH ON TEETH IN EVENING BEFORE BED AND EXPETORATE.  DO NOT RINSE, Disp: , Rfl: 5  gabapentin 300 MG Oral Cap, TAKE 1 CAPSULE BY MOUTH TWICE DAILY, Disp: , Rfl: 0  Loperamide HCl (IMODIUM A-D OR), Take by mouth daily. , Disp: , Rfl:   ergocalciferol 50333 units Oral Cap, once a week.  , Disp: , Rfl: 0    No current facility-administered medications on file prior to visit. Allergies    Haldol [Haloperidol]    OTHER (SEE COMMENTS)    Comment:Cannot sleep, psychiatric problem  Mucinex Dm Maximum *    OTHER (SEE COMMENTS)    Comment:Stomach cramps  Zofran [Ondansetron]    OTHER (SEE COMMENTS)    Comment:constipation    Physical Exam  Constitutional: no acute distress  HEENT: PERRL  Cardio: RRR, S1 S2  Respiratory: clear to auscultation bilaterally, no wheezing, rales, rhonchi, crackles  GI: abdomen soft, non tender  Extremities: no clubbing, cyanosis, edema  Neurologic: no gross motor deficits  Skin: warm, dry  Lymphatic: no supraclavicular lymphadenopathy     Assessment  1. Extrinsic asthma  2. Possible BERNARDO  3.  Obesity      Plan  -Patient seen today for follow-up visit for management of asthma. Asthma symptoms well controlled at this time. Advised patient to continue Spiriva and Breo which I have placed new prescription. Avoid known triggers.     Kelli Engel,   Pulmonary Medicine  [facility]

## 2022-02-17 ENCOUNTER — PATIENT MESSAGE (OUTPATIENT)
Dept: PULMONOLOGY | Facility: CLINIC | Age: 32
End: 2022-02-17

## 2022-02-18 ENCOUNTER — OFFICE VISIT (OUTPATIENT)
Dept: FAMILY MEDICINE CLINIC | Facility: CLINIC | Age: 32
End: 2022-02-18
Payer: MEDICAID

## 2022-02-18 VITALS
BODY MASS INDEX: 45.1 KG/M2 | SYSTOLIC BLOOD PRESSURE: 133 MMHG | DIASTOLIC BLOOD PRESSURE: 79 MMHG | HEIGHT: 70 IN | WEIGHT: 315 LBS | HEART RATE: 92 BPM

## 2022-02-18 DIAGNOSIS — J45.40 MODERATE PERSISTENT ASTHMA WITHOUT COMPLICATION: Primary | ICD-10-CM

## 2022-02-18 DIAGNOSIS — H61.23 BILATERAL IMPACTED CERUMEN: ICD-10-CM

## 2022-02-18 DIAGNOSIS — M79.672 BILATERAL FOOT PAIN: ICD-10-CM

## 2022-02-18 DIAGNOSIS — M79.671 BILATERAL FOOT PAIN: ICD-10-CM

## 2022-02-18 PROCEDURE — 3078F DIAST BP <80 MM HG: CPT | Performed by: FAMILY MEDICINE

## 2022-02-18 PROCEDURE — 3008F BODY MASS INDEX DOCD: CPT | Performed by: FAMILY MEDICINE

## 2022-02-18 PROCEDURE — 90715 TDAP VACCINE 7 YRS/> IM: CPT | Performed by: FAMILY MEDICINE

## 2022-02-18 PROCEDURE — 90471 IMMUNIZATION ADMIN: CPT | Performed by: FAMILY MEDICINE

## 2022-02-18 PROCEDURE — 3075F SYST BP GE 130 - 139MM HG: CPT | Performed by: FAMILY MEDICINE

## 2022-02-18 PROCEDURE — 99214 OFFICE O/P EST MOD 30 MIN: CPT | Performed by: FAMILY MEDICINE

## 2022-02-18 NOTE — TELEPHONE ENCOUNTER
From: Jerardo Telles  To: Nena Arciniega DO  Sent: 2/17/2022 6:27 PM CST  Subject: Rescue Inhaler    I need a refill of my rescue inhaler. I've been using it more often so I kind of just forgot that it's not listed in my list of medications. Its just the albuterol sulfate one.

## 2022-02-18 NOTE — TELEPHONE ENCOUNTER
Attempted to call patient to further triage. Left message to call back. [employer] message sent. Patient was just seen 2/10/22. Albuterol inhaler pended for signature. Dr. Clark please advise on rx.

## 2022-02-21 NOTE — TELEPHONE ENCOUNTER
please see [employer] message  2/18/22. Rescue inhaler is pending please sign off if agreeable.

## 2022-02-22 RX ORDER — ALBUTEROL SULFATE 90 UG/1
2 AEROSOL, METERED RESPIRATORY (INHALATION) 4 TIMES DAILY PRN
Qty: 1 EACH | Refills: 3 | Status: SHIPPED | OUTPATIENT
Start: 2022-02-22

## 2022-03-21 ENCOUNTER — TELEPHONE (OUTPATIENT)
Dept: FAMILY MEDICINE CLINIC | Facility: CLINIC | Age: 32
End: 2022-03-21

## 2022-03-22 RX ORDER — CETIRIZINE HYDROCHLORIDE 10 MG/1
10 TABLET ORAL DAILY
Qty: 90 TABLET | Refills: 1 | Status: SHIPPED | OUTPATIENT
Start: 2022-03-22

## 2022-03-23 NOTE — TELEPHONE ENCOUNTER
Refill passed per [facility] protocol   Requested Prescriptions   Pending Prescriptions Disp Refills    cetirizine (EQ ALLERGY RELIEF, CETIRIZINE,) 10 MG Oral Tab 90 tablet 0     Sig: Take 1 tablet (10 mg total) by mouth daily.  TAKE 1 TABLET BY MOUTH ONCE DAILY FOR ALLERGY SYMPTOMS        Allergy Medication Protocol Passed - 3/22/2022  5:43 PM        Passed - Appoinment in past 12 or next 3 months

## 2022-05-24 NOTE — TELEPHONE ENCOUNTER
Mattie for [facility] [employer] to check on status for authorization of approval for MRI brain. Approval was given with  Authorization # [id number] effective 08/21/19 to 10/05/19. Will call Pt. To inform. Pt. advised of approval. He will call to schedule appt. Atrial fibrillation Vascular stents/Clips

## 2022-07-05 ENCOUNTER — PATIENT MESSAGE (OUTPATIENT)
Dept: FAMILY MEDICINE CLINIC | Facility: CLINIC | Age: 32
End: 2022-07-05

## 2022-07-06 NOTE — TELEPHONE ENCOUNTER
From: Vanda Telles  To: David Downs DO  Sent: 7/5/2022 10:49 PM CDT  Subject: Vaccine Booster Question    Just wanted to check in and ask if I should get a second moderna covid-19 booster shot (4th shot in total when you count the first two shots+the first, after six months, booster)? I didn't know what the rules were for getting another booster after six months, or even if I had to.

## 2022-07-15 RX ORDER — FLUTICASONE PROPIONATE 50 MCG
SPRAY, SUSPENSION (ML) NASAL
Qty: 16 G | Refills: 5 | Status: SHIPPED | OUTPATIENT
Start: 2022-07-15

## 2022-07-21 ENCOUNTER — TELEPHONE (OUTPATIENT)
Dept: GASTROENTEROLOGY | Facility: CLINIC | Age: 32
End: 2022-07-21

## 2022-07-21 NOTE — TELEPHONE ENCOUNTER
Patient has not been seen since 03/08/2021. If still continuing PPI therapy would warrant clinic evaluation for annual assessment and/or r/s egd as previously recommended.

## 2022-08-03 RX ORDER — VALSARTAN 80 MG/1
80 TABLET ORAL
Qty: 90 TABLET | Refills: 0 | Status: SHIPPED | OUTPATIENT
Start: 2022-08-03 | End: 2022-09-08

## 2022-08-03 NOTE — TELEPHONE ENCOUNTER
90 day refill given for Dr. Diana Jules  Please call patient to schedule follow-up with Dr. Diana Jules for further refills.

## 2022-08-03 NOTE — TELEPHONE ENCOUNTER
1st attempt - [employer] message sent for patient to contact the office to schedule an appointment; see notes below.

## 2022-08-06 ENCOUNTER — HOSPITAL ENCOUNTER (EMERGENCY)
Facility: HOSPITAL | Age: 32
Discharge: HOME | End: 2022-08-07
Attending: EMERGENCY MEDICINE
Payer: MEDICAID

## 2022-08-06 ENCOUNTER — APPOINTMENT (OUTPATIENT)
Dept: GENERAL RADIOLOGY | Facility: HOSPITAL | Age: 32
End: 2022-08-06
Attending: EMERGENCY MEDICINE
Payer: MEDICAID

## 2022-08-06 DIAGNOSIS — M54.16 LUMBAR RADICULOPATHY: Primary | ICD-10-CM

## 2022-08-06 PROCEDURE — 99284 EMERGENCY DEPT VISIT MOD MDM: CPT

## 2022-08-06 PROCEDURE — 96372 THER/PROPH/DIAG INJ SC/IM: CPT

## 2022-08-06 PROCEDURE — 72100 X-RAY EXAM L-S SPINE 2/3 VWS: CPT | Performed by: EMERGENCY MEDICINE

## 2022-08-06 RX ORDER — ORPHENADRINE CITRATE 30 MG/ML
30 INJECTION INTRAMUSCULAR; INTRAVENOUS ONCE
Status: COMPLETED | OUTPATIENT
Start: 2022-08-06 | End: 2022-08-07

## 2022-08-06 RX ORDER — KETOROLAC TROMETHAMINE 30 MG/ML
30 INJECTION, SOLUTION INTRAMUSCULAR; INTRAVENOUS ONCE
Status: COMPLETED | OUTPATIENT
Start: 2022-08-06 | End: 2022-08-07

## 2022-08-07 VITALS
TEMPERATURE: 98 F | BODY MASS INDEX: 45.1 KG/M2 | OXYGEN SATURATION: 97 % | WEIGHT: 315 LBS | SYSTOLIC BLOOD PRESSURE: 152 MMHG | HEART RATE: 76 BPM | RESPIRATION RATE: 16 BRPM | DIASTOLIC BLOOD PRESSURE: 105 MMHG | HEIGHT: 70 IN

## 2022-08-07 RX ORDER — PREDNISONE 20 MG/1
40 TABLET ORAL DAILY
Qty: 8 TABLET | Refills: 0 | Status: SHIPPED | OUTPATIENT
Start: 2022-08-07 | End: 2022-08-07

## 2022-08-07 RX ORDER — ORPHENADRINE CITRATE 100 MG/1
100 TABLET, EXTENDED RELEASE ORAL 2 TIMES DAILY
Qty: 20 EACH | Refills: 0 | Status: SHIPPED | OUTPATIENT
Start: 2022-08-07 | End: 2022-08-17

## 2022-08-07 RX ORDER — KETOROLAC TROMETHAMINE 10 MG/1
10 TABLET, FILM COATED ORAL EVERY 6 HOURS PRN
Qty: 20 TABLET | Refills: 0 | Status: SHIPPED | OUTPATIENT
Start: 2022-08-07 | End: 2022-08-12

## 2022-08-07 RX ORDER — PREDNISONE 10 MG/1
10 TABLET ORAL DAILY
Qty: 4 TABLET | Refills: 0 | Status: SHIPPED | OUTPATIENT
Start: 2022-08-07 | End: 2022-08-11

## 2022-08-07 NOTE — ED QUICK NOTES
Pt a/ox4, respirations unlabored, speech full/clear, gait steady, no acute distress. All ED orders completed. Pt instructed to return to ED for any new/severe s/s or as directed by provider, and to see PMD/specialist ASAP or as directed by provider. Pt reports 12hrs since last BP meds, denies CP, SOB, dizziness.

## 2022-08-07 NOTE — ED INITIAL ASSESSMENT (HPI)
Pt Arrives via EMS with c/o lower back pain after bending to put dog down on the groud this evening.  Pt reports that pain is radiating down bilateral legs (R>L)

## 2022-08-22 ENCOUNTER — TELEPHONE (OUTPATIENT)
Dept: PHYSICAL MEDICINE AND REHAB | Facility: CLINIC | Age: 32
End: 2022-08-22

## 2022-08-22 NOTE — TELEPHONE ENCOUNTER
SEAMUSTCB      PSR- Please call patient to make an in-office appointment with me within 7 days.  [employer]

## 2022-09-08 ENCOUNTER — OFFICE VISIT (OUTPATIENT)
Dept: FAMILY MEDICINE CLINIC | Facility: CLINIC | Age: 32
End: 2022-09-08
Payer: MEDICAID

## 2022-09-08 VITALS
HEIGHT: 70 IN | WEIGHT: 315 LBS | DIASTOLIC BLOOD PRESSURE: 81 MMHG | BODY MASS INDEX: 45.1 KG/M2 | SYSTOLIC BLOOD PRESSURE: 136 MMHG | HEART RATE: 94 BPM

## 2022-09-08 DIAGNOSIS — R79.89 LOW SERUM VITAMIN D: ICD-10-CM

## 2022-09-08 DIAGNOSIS — I10 ESSENTIAL HYPERTENSION: Primary | ICD-10-CM

## 2022-09-08 PROCEDURE — 99214 OFFICE O/P EST MOD 30 MIN: CPT | Performed by: FAMILY MEDICINE

## 2022-09-08 PROCEDURE — 3075F SYST BP GE 130 - 139MM HG: CPT | Performed by: FAMILY MEDICINE

## 2022-09-08 PROCEDURE — 3008F BODY MASS INDEX DOCD: CPT | Performed by: FAMILY MEDICINE

## 2022-09-08 PROCEDURE — 3079F DIAST BP 80-89 MM HG: CPT | Performed by: FAMILY MEDICINE

## 2022-09-08 RX ORDER — CETIRIZINE HYDROCHLORIDE 10 MG/1
10 TABLET ORAL DAILY
Qty: 90 TABLET | Refills: 2 | Status: SHIPPED | OUTPATIENT
Start: 2022-09-08

## 2022-09-08 RX ORDER — VALSARTAN 80 MG/1
80 TABLET ORAL
Qty: 90 TABLET | Refills: 2 | Status: SHIPPED | OUTPATIENT
Start: 2022-09-08

## 2022-09-10 ENCOUNTER — PATIENT MESSAGE (OUTPATIENT)
Facility: CLINIC | Age: 32
End: 2022-09-10

## 2022-09-10 RX ORDER — OMEPRAZOLE 40 MG/1
40 CAPSULE, DELAYED RELEASE ORAL DAILY
Qty: 30 CAPSULE | Refills: 11 | Status: CANCELLED | OUTPATIENT
Start: 2022-09-10

## 2022-09-12 ENCOUNTER — TELEPHONE (OUTPATIENT)
Dept: GASTROENTEROLOGY | Facility: CLINIC | Age: 32
End: 2022-09-12

## 2022-09-12 RX ORDER — OMEPRAZOLE 40 MG/1
40 CAPSULE, DELAYED RELEASE ORAL
Qty: 90 CAPSULE | Refills: 1 | Status: SHIPPED | OUTPATIENT
Start: 2022-09-12

## 2022-09-12 NOTE — TELEPHONE ENCOUNTER
Spoke with Ame Pacheco, pharmacist from [facility]. Patient has met quantity limit for the year. Max 120 tablets in 365 days met. Cost out-of-pocket: $ 14.00 for 30-day supply. Contacted plan Sondra Arana [address]. P: [phone] (ID #: [id number])    Spoke with Radha Mo () at [facility] prescription services. Verified patient name, , rx details. Able to submit quantity limit over the phone with representative marked as expedited review. Receive response within the next 24 hours.

## 2022-09-12 NOTE — TELEPHONE ENCOUNTER
From: Camilo Moody  To: Cyndy Bhagat MD  Sent: 9/10/2022 6:22 PM CDT  Subject: About the Refill Request I Sent    I put in a refill request for Omeprazole today. I know you want to see me, and I have an appointment for the end of October, but I'll run out of pills before then so I was hoping to get at least two month's worth of refills to hold over till then.

## 2022-09-16 NOTE — TELEPHONE ENCOUNTER
PA for omeprazole 40 mg/daily was denied for quantity limit override. Out-of-pocket $14 for 30-day supply. Frida Clas to ensure able to fill rx with above cost.     Left message to call back. Plan to await call back and/or follow up.

## 2022-09-16 NOTE — TELEPHONE ENCOUNTER
Teresa Crandall provided update via [employer]. Filled omeprazole rx at pharmacy. No further action required.

## 2022-09-25 ENCOUNTER — PATIENT MESSAGE (OUTPATIENT)
Dept: FAMILY MEDICINE CLINIC | Facility: CLINIC | Age: 32
End: 2022-09-25

## 2022-09-26 ENCOUNTER — OFFICE VISIT (OUTPATIENT)
Dept: FAMILY MEDICINE CLINIC | Facility: CLINIC | Age: 32
End: 2022-09-26

## 2022-09-26 VITALS
DIASTOLIC BLOOD PRESSURE: 85 MMHG | SYSTOLIC BLOOD PRESSURE: 135 MMHG | HEART RATE: 90 BPM | BODY MASS INDEX: 45.1 KG/M2 | HEIGHT: 70 IN | WEIGHT: 315 LBS

## 2022-09-26 DIAGNOSIS — H61.22 IMPACTED CERUMEN OF LEFT EAR: Primary | ICD-10-CM

## 2022-09-26 PROCEDURE — 3075F SYST BP GE 130 - 139MM HG: CPT | Performed by: FAMILY MEDICINE

## 2022-09-26 PROCEDURE — 99213 OFFICE O/P EST LOW 20 MIN: CPT | Performed by: FAMILY MEDICINE

## 2022-09-26 PROCEDURE — 3079F DIAST BP 80-89 MM HG: CPT | Performed by: FAMILY MEDICINE

## 2022-09-26 PROCEDURE — 3008F BODY MASS INDEX DOCD: CPT | Performed by: FAMILY MEDICINE

## 2022-09-29 ENCOUNTER — LAB ENCOUNTER (OUTPATIENT)
Dept: LAB | Age: 32
End: 2022-09-29
Attending: FAMILY MEDICINE
Payer: MEDICAID

## 2022-09-29 DIAGNOSIS — R79.89 LOW SERUM VITAMIN D: ICD-10-CM

## 2022-09-29 LAB
CHOLEST SERPL-MCNC: 163 MG/DL (ref ?–200)
FASTING PATIENT LIPID ANSWER: NO
HDLC SERPL-MCNC: 35 MG/DL (ref 40–59)
LDLC SERPL CALC-MCNC: 111 MG/DL (ref ?–100)
NONHDLC SERPL-MCNC: 128 MG/DL (ref ?–130)
TRIGL SERPL-MCNC: 91 MG/DL (ref 30–149)
VIT D+METAB SERPL-MCNC: 13.7 NG/ML (ref 30–100)
VLDLC SERPL CALC-MCNC: 16 MG/DL (ref 0–30)

## 2022-09-29 PROCEDURE — 82306 VITAMIN D 25 HYDROXY: CPT

## 2022-09-29 PROCEDURE — 80061 LIPID PANEL: CPT | Performed by: FAMILY MEDICINE

## 2022-09-29 PROCEDURE — 36415 COLL VENOUS BLD VENIPUNCTURE: CPT | Performed by: FAMILY MEDICINE

## 2022-09-30 ENCOUNTER — OFFICE VISIT (OUTPATIENT)
Dept: FAMILY MEDICINE CLINIC | Facility: CLINIC | Age: 32
End: 2022-09-30

## 2022-09-30 VITALS
HEIGHT: 70 IN | DIASTOLIC BLOOD PRESSURE: 75 MMHG | BODY MASS INDEX: 45.1 KG/M2 | SYSTOLIC BLOOD PRESSURE: 127 MMHG | OXYGEN SATURATION: 95 % | RESPIRATION RATE: 16 BRPM | WEIGHT: 315 LBS | HEART RATE: 104 BPM | TEMPERATURE: 98 F

## 2022-09-30 DIAGNOSIS — H61.23 BILATERAL IMPACTED CERUMEN: Primary | ICD-10-CM

## 2022-09-30 PROCEDURE — 3008F BODY MASS INDEX DOCD: CPT

## 2022-09-30 PROCEDURE — 3074F SYST BP LT 130 MM HG: CPT

## 2022-09-30 PROCEDURE — 99213 OFFICE O/P EST LOW 20 MIN: CPT

## 2022-09-30 PROCEDURE — 3078F DIAST BP <80 MM HG: CPT

## 2022-10-11 RX ORDER — ERGOCALCIFEROL 1.25 MG/1
50000 CAPSULE ORAL WEEKLY
Qty: 5 CAPSULE | Refills: 2 | Status: SHIPPED | OUTPATIENT
Start: 2022-10-11 | End: 2022-11-10

## 2022-10-31 ENCOUNTER — OFFICE VISIT (OUTPATIENT)
Facility: CLINIC | Age: 32
End: 2022-10-31
Payer: MEDICAID

## 2022-10-31 ENCOUNTER — TELEPHONE (OUTPATIENT)
Facility: CLINIC | Age: 32
End: 2022-10-31

## 2022-10-31 VITALS
DIASTOLIC BLOOD PRESSURE: 87 MMHG | SYSTOLIC BLOOD PRESSURE: 136 MMHG | WEIGHT: 315 LBS | BODY MASS INDEX: 45.1 KG/M2 | HEART RATE: 99 BPM | HEIGHT: 70 IN

## 2022-10-31 DIAGNOSIS — K21.00 GASTROESOPHAGEAL REFLUX DISEASE WITH ESOPHAGITIS, UNSPECIFIED WHETHER HEMORRHAGE: Primary | ICD-10-CM

## 2022-10-31 PROCEDURE — 3008F BODY MASS INDEX DOCD: CPT | Performed by: INTERNAL MEDICINE

## 2022-10-31 PROCEDURE — 3079F DIAST BP 80-89 MM HG: CPT | Performed by: INTERNAL MEDICINE

## 2022-10-31 PROCEDURE — 3075F SYST BP GE 130 - 139MM HG: CPT | Performed by: INTERNAL MEDICINE

## 2022-10-31 PROCEDURE — 99214 OFFICE O/P EST MOD 30 MIN: CPT | Performed by: INTERNAL MEDICINE

## 2022-10-31 RX ORDER — PREDNISONE 20 MG/1
20 TABLET ORAL DAILY
COMMUNITY
Start: 2022-08-07

## 2022-10-31 RX ORDER — OMEPRAZOLE 40 MG/1
40 CAPSULE, DELAYED RELEASE ORAL
Qty: 90 CAPSULE | Refills: 3 | Status: SHIPPED | OUTPATIENT
Start: 2022-10-31

## 2022-10-31 NOTE — TELEPHONE ENCOUNTER
Scheduled for: [place] [address] with possible dilation  Provider Name:  Vernon Jaffe  Date:  2/20/2023  Location:  [facility]  Sedation:  Mac  Time:  0730 Am (pt is aware to arrive at 0630 Am )   Prep: Egd -Npo after midnight Prep instructions were given to pt in the office, pt verbalized understanding. Meds/Allergies Reconciled?:  Physician reviewed     Diagnosis with codes: Elvis Lange with esophagitis -K21.00   Was patient informed to call insurance with codes (Y/N):  Yes, I confirmed Medicaid replacement  insurance with the patient. The patient also verbally understands to call his insurance to check for pre-cert, codes were given on prep instructions. Referral sent?:  Yes  [facility] or [address] notified?:  I sent an electronic request to Endo Scheduling and received a confirmation today. Medication Orders: This patient verbally confirmed that is taking this medication   Iron, blood thinners, BP meds- Valsartan to hold in th evening before or the morning of the procedure , and is not diabetic   Not latex allergy, Not PCN allergy and does not have a pacemaker Pt is aware to NOT take iron pills, herbal meds and diet supplements for 7 days before exam. Also to NOT take any form of alcohol, recreational drugs and any forms of ED meds 24 hours before exam.    Misc Orders:  Patient was informed that they will need a COVID 19 test prior to their procedure. Patient verbally understood & will await a phone call from [facility] to schedule.       Further instructions given by staff:

## 2022-10-31 NOTE — PATIENT INSTRUCTIONS
1. Nausea and vomiting, intractability of vomiting not specified, unspecified vomiting type, improved     2. Irritable bowel syndrome with diarrhea  controlled with tums, prilosec and reglan     3. IBS-D   Controlled with imodium  Does not want colonoscopy     4. Reflux   Having breakthrough with just tums takes 1-2 times, now improved and only as needed (4 months prior)  Was taking it daily before  Omeprazole     Recommend:  The pathophysiology of acid reflux was discussed. Discussed patient symptoms and triggers. Reviewed anti-reflux measures such as raising the head of the bed at night, avoiding tight clothing or belts, avoiding eating late at night and not lying down shortly after mealtime and achieving weight loss were discussed. Discussed patient should also avoid NSAID's, caffeine, peppermints, alcohol, tobacco and foods that incite symptoms   Pantoprazole before bedtime/dinner  Does not want colonoscopy given workup was done and age. No weight loss or blood in the stool. Afraid of the prep. Discussed EGD again and now ready (did postpone due to COVID)  Low FODMAP diet, not interested at this time  Reglan or metoclopramide is a medication used to treat gastroparesis or abnormal stomach emptying. The drug is effective in this regard and also helps with nausea and vomiting.  Is going   Given that patient has been stable for >3 years on regimen will not change at this time  Imodium daily    Recommend:  -Schedule EGD w/ possible biopsy/dilation w/ MAC for esophagitis, abdominal pain and reflux    ** If MAC @ [facility]/NE:    - NO alcohol, recreational drugs nor erectile dysfunction mediations 24 hours before procedure(s)   - NO herbal supplements or weight loss medications x 7 days prior to the procedure(s)    ** If MAC @ [facility] or  twilit - continue all medications as prescribed

## 2022-11-13 ENCOUNTER — APPOINTMENT (OUTPATIENT)
Dept: GENERAL RADIOLOGY | Age: 32
End: 2022-11-13
Attending: PHYSICIAN ASSISTANT
Payer: MEDICAID

## 2022-11-13 ENCOUNTER — HOSPITAL ENCOUNTER (EMERGENCY)
Facility: HOSPITAL | Age: 32
Discharge: HOME | End: 2022-11-13
Attending: EMERGENCY MEDICINE
Payer: MEDICAID

## 2022-11-13 ENCOUNTER — HOSPITAL ENCOUNTER (OUTPATIENT)
Age: 32
Discharge: HOME | End: 2022-11-13
Payer: MEDICAID

## 2022-11-13 VITALS
DIASTOLIC BLOOD PRESSURE: 89 MMHG | RESPIRATION RATE: 26 BRPM | TEMPERATURE: 99 F | HEART RATE: 112 BPM | OXYGEN SATURATION: 91 % | SYSTOLIC BLOOD PRESSURE: 119 MMHG

## 2022-11-13 VITALS
SYSTOLIC BLOOD PRESSURE: 128 MMHG | OXYGEN SATURATION: 96 % | DIASTOLIC BLOOD PRESSURE: 86 MMHG | HEIGHT: 71 IN | BODY MASS INDEX: 44.1 KG/M2 | TEMPERATURE: 98 F | HEART RATE: 77 BPM | WEIGHT: 315 LBS | RESPIRATION RATE: 16 BRPM

## 2022-11-13 DIAGNOSIS — J11.1 INFLUENZA: Primary | ICD-10-CM

## 2022-11-13 DIAGNOSIS — R06.02 SHORTNESS OF BREATH: Primary | ICD-10-CM

## 2022-11-13 DIAGNOSIS — J45.901 EXACERBATION OF ASTHMA, UNSPECIFIED ASTHMA SEVERITY, UNSPECIFIED WHETHER PERSISTENT: ICD-10-CM

## 2022-11-13 DIAGNOSIS — J45.21 MILD INTERMITTENT ASTHMA WITH EXACERBATION: ICD-10-CM

## 2022-11-13 LAB
ANION GAP SERPL CALC-SCNC: 4 MMOL/L (ref 0–18)
BASOPHILS # BLD AUTO: 0.01 X10(3) UL (ref 0–0.2)
BASOPHILS NFR BLD AUTO: 0.1 %
BUN BLD-MCNC: 6 MG/DL (ref 7–18)
BUN/CREAT SERPL: 5.8 (ref 10–20)
CALCIUM BLD-MCNC: 9.2 MG/DL (ref 8.5–10.1)
CHLORIDE SERPL-SCNC: 102 MMOL/L (ref 98–112)
CO2 SERPL-SCNC: 30 MMOL/L (ref 21–32)
CREAT BLD-MCNC: 1.04 MG/DL
DEPRECATED RDW RBC AUTO: 45.7 FL (ref 35.1–46.3)
EOSINOPHIL # BLD AUTO: 0.02 X10(3) UL (ref 0–0.7)
EOSINOPHIL NFR BLD AUTO: 0.3 %
ERYTHROCYTE [DISTWIDTH] IN BLOOD BY AUTOMATED COUNT: 15.1 % (ref 11–15)
FLUAV + FLUBV RNA SPEC NAA+PROBE: NEGATIVE
FLUAV + FLUBV RNA SPEC NAA+PROBE: POSITIVE
GFR SERPLBLD BASED ON 1.73 SQ M-ARVRAT: 97 ML/MIN/1.73M2 (ref 60–?)
GLUCOSE BLD-MCNC: 96 MG/DL (ref 70–99)
HCT VFR BLD AUTO: 43.4 %
HGB BLD-MCNC: 13.7 G/DL
IMM GRANULOCYTES # BLD AUTO: 0.01 X10(3) UL (ref 0–1)
IMM GRANULOCYTES NFR BLD: 0.1 %
LYMPHOCYTES # BLD AUTO: 0.48 X10(3) UL (ref 1–4)
LYMPHOCYTES NFR BLD AUTO: 6.5 %
MCH RBC QN AUTO: 26.4 PG (ref 26–34)
MCHC RBC AUTO-ENTMCNC: 31.6 G/DL (ref 31–37)
MCV RBC AUTO: 83.8 FL
MONOCYTES # BLD AUTO: 0.43 X10(3) UL (ref 0.1–1)
MONOCYTES NFR BLD AUTO: 5.9 %
NEUTROPHILS # BLD AUTO: 6.39 X10 (3) UL (ref 1.5–7.7)
NEUTROPHILS # BLD AUTO: 6.39 X10(3) UL (ref 1.5–7.7)
NEUTROPHILS NFR BLD AUTO: 87.1 %
OSMOLALITY SERPL CALC.SUM OF ELEC: 279 MOSM/KG (ref 275–295)
PLATELET # BLD AUTO: 304 10(3)UL (ref 150–450)
POTASSIUM SERPL-SCNC: 3.7 MMOL/L (ref 3.5–5.1)
RBC # BLD AUTO: 5.18 X10(6)UL
RSV RNA SPEC NAA+PROBE: NEGATIVE
SARS-COV-2 RNA RESP QL NAA+PROBE: NOT DETECTED
SARS-COV-2 RNA RESP QL NAA+PROBE: NOT DETECTED
SODIUM SERPL-SCNC: 136 MMOL/L (ref 136–145)
WBC # BLD AUTO: 7.3 X10(3) UL (ref 4–11)

## 2022-11-13 PROCEDURE — 99284 EMERGENCY DEPT VISIT MOD MDM: CPT

## 2022-11-13 PROCEDURE — U0002 COVID-19 LAB TEST NON-CDC: HCPCS | Performed by: PHYSICIAN ASSISTANT

## 2022-11-13 PROCEDURE — 96360 HYDRATION IV INFUSION INIT: CPT

## 2022-11-13 PROCEDURE — 99213 OFFICE O/P EST LOW 20 MIN: CPT | Performed by: PHYSICIAN ASSISTANT

## 2022-11-13 PROCEDURE — 94640 AIRWAY INHALATION TREATMENT: CPT | Performed by: PHYSICIAN ASSISTANT

## 2022-11-13 PROCEDURE — 94640 AIRWAY INHALATION TREATMENT: CPT

## 2022-11-13 PROCEDURE — 71046 X-RAY EXAM CHEST 2 VIEWS: CPT | Performed by: PHYSICIAN ASSISTANT

## 2022-11-13 PROCEDURE — 80048 BASIC METABOLIC PNL TOTAL CA: CPT | Performed by: EMERGENCY MEDICINE

## 2022-11-13 PROCEDURE — 0241U SARS-COV-2/FLU A AND B/RSV BY PCR (GENEXPERT): CPT | Performed by: EMERGENCY MEDICINE

## 2022-11-13 PROCEDURE — 85025 COMPLETE CBC W/AUTO DIFF WBC: CPT | Performed by: EMERGENCY MEDICINE

## 2022-11-13 RX ORDER — ALBUTEROL SULFATE 90 UG/1
2 AEROSOL, METERED RESPIRATORY (INHALATION) 4 TIMES DAILY PRN
Qty: 1 EACH | Refills: 3 | Status: SHIPPED | OUTPATIENT
Start: 2022-11-13 | End: 2022-11-13

## 2022-11-13 RX ORDER — IPRATROPIUM BROMIDE AND ALBUTEROL SULFATE 2.5; .5 MG/3ML; MG/3ML
3 SOLUTION RESPIRATORY (INHALATION) ONCE
Status: COMPLETED | OUTPATIENT
Start: 2022-11-13 | End: 2022-11-13

## 2022-11-13 RX ORDER — OSELTAMIVIR PHOSPHATE 75 MG/1
75 CAPSULE ORAL 2 TIMES DAILY
Qty: 10 CAPSULE | Refills: 0 | Status: SHIPPED | OUTPATIENT
Start: 2022-11-13 | End: 2022-11-18

## 2022-11-13 RX ORDER — PREDNISONE 10 MG/1
10 TABLET ORAL DAILY
Qty: 5 TABLET | Refills: 0 | Status: SHIPPED | OUTPATIENT
Start: 2022-11-13 | End: 2022-11-13

## 2022-11-13 RX ORDER — BENZONATATE 100 MG/1
100 CAPSULE ORAL 3 TIMES DAILY PRN
Qty: 21 CAPSULE | Refills: 0 | Status: SHIPPED | OUTPATIENT
Start: 2022-11-13 | End: 2022-11-20

## 2022-11-13 RX ORDER — PREDNISONE 10 MG/1
10 TABLET ORAL DAILY
Qty: 5 TABLET | Refills: 0 | Status: SHIPPED | OUTPATIENT
Start: 2022-11-13 | End: 2022-11-18

## 2022-11-13 RX ORDER — METOCLOPRAMIDE HYDROCHLORIDE 5 MG/ML
10 INJECTION INTRAMUSCULAR; INTRAVENOUS ONCE
Status: DISCONTINUED | OUTPATIENT
Start: 2022-11-13 | End: 2022-11-13

## 2022-11-13 RX ORDER — ALBUTEROL SULFATE 90 UG/1
2 AEROSOL, METERED RESPIRATORY (INHALATION) 4 TIMES DAILY PRN
Qty: 1 EACH | Refills: 3 | Status: SHIPPED | OUTPATIENT
Start: 2022-11-13

## 2022-11-13 RX ORDER — BENZONATATE 100 MG/1
100 CAPSULE ORAL 3 TIMES DAILY PRN
Qty: 21 CAPSULE | Refills: 0 | Status: SHIPPED | OUTPATIENT
Start: 2022-11-13 | End: 2022-11-13

## 2022-11-13 NOTE — DISCHARGE INSTRUCTIONS
Use inhaler every 2 puffs every 4 hrs for the next 3-4 days    If you continue to have wheezing, feel SOB or chest tightness- you need to be seen in the ER    Call your primary care doctor to follow up by the end of week

## 2022-11-13 NOTE — ED QUICK NOTES
Pt would prefer not to do an IV at this time unless MD finds medically necessary. Rhonchi noted upon auscultation. No insp/exp wheezing. Received duoneb in [address] [place] [address]. SpO2 97% upon arrival to room 31. Ambulated to room 31 with some DIE.

## 2022-11-13 NOTE — ED INITIAL ASSESSMENT (HPI)
Patient to ER from [address] [place] [address] with c/o congested cough, wheezing and difficulty breathing. Patient states his spo2 was as low as 88%. Patient did have a neb at [address] [place] [address]. History of asthma.

## 2022-11-14 NOTE — DISCHARGE INSTRUCTIONS
Take prednisone as previously prescribed. Take Tamiflu as prescribed. Use inhaler up to every 4 hours as needed. Follow-up with your primary physician. Return to the emergency department if increased difficulty breathing or other new symptoms develop.

## 2022-11-15 ENCOUNTER — PATIENT MESSAGE (OUTPATIENT)
Facility: CLINIC | Age: 32
End: 2022-11-15

## 2022-11-15 NOTE — TELEPHONE ENCOUNTER
From: Joelle Caputo  To: Mayo Fabry, MD  Sent: 11/15/2022 3:31 PM CST  Subject: Leopoldo Edeleazar haileemiranda it took so long to get back to you on the dosage info for Reglan so you could give me the 90 day refill. It took me a bit to find where the bag I put the bottle in was. I take the 10mg dosage version. I currently have the flu and am suffering from pretty consistent nausea so a prompt refill as soon as you're able would be deeply appreciated. Thank you.

## 2022-11-16 ENCOUNTER — TELEPHONE (OUTPATIENT)
Facility: CLINIC | Age: 32
End: 2022-11-16

## 2022-11-16 DIAGNOSIS — K21.00 GASTROESOPHAGEAL REFLUX DISEASE WITH ESOPHAGITIS, UNSPECIFIED WHETHER HEMORRHAGE: Primary | ICD-10-CM

## 2022-11-16 RX ORDER — METOCLOPRAMIDE 10 MG/1
10 TABLET ORAL EVERY 6 HOURS PRN
Qty: 90 TABLET | Refills: 0 | Status: SHIPPED | OUTPATIENT
Start: 2022-11-16

## 2022-11-16 NOTE — TELEPHONE ENCOUNTER
Patient contacted and states he has been taking Quetiapine for a long time and takes it nightly for sleep. Patient states after reading the interactions between Quetiapine and Reglan on WebMD, he does not want to take the Reglan. Message sent to Dr. Mya Nash.

## 2022-11-16 NOTE — TELEPHONE ENCOUNTER
Chandu Haynes will be on vacation 2/20/2023. Please assist with canceling and rescheduling procedure. Thank you!

## 2022-11-16 NOTE — TELEPHONE ENCOUNTER
Not sure if it is a new medication  Called patient and left message to call back  It is a high risk interaction but he said he takes it once every few months  Will await his call back to see if Quetiapine is a new medication

## 2022-11-16 NOTE — TELEPHONE ENCOUNTER
Ordered but please make sure patient reviews potential side effects with pharmacy  Takes rarely but should know risk.  Did discuss in office as well

## 2022-11-16 NOTE — TELEPHONE ENCOUNTER
Pt is returning call from Dr. Gabby Rodríguez.     He is also returning the call about his CLN being rescheduled

## 2022-11-17 ENCOUNTER — HOSPITAL ENCOUNTER (OUTPATIENT)
Age: 32
Discharge: HOME | End: 2022-11-17
Payer: MEDICAID

## 2022-11-17 ENCOUNTER — TELEPHONE (OUTPATIENT)
Dept: FAMILY MEDICINE CLINIC | Facility: CLINIC | Age: 32
End: 2022-11-17

## 2022-11-17 VITALS
BODY MASS INDEX: 54 KG/M2 | RESPIRATION RATE: 22 BRPM | DIASTOLIC BLOOD PRESSURE: 91 MMHG | HEART RATE: 98 BPM | OXYGEN SATURATION: 96 % | TEMPERATURE: 99 F | WEIGHT: 315 LBS | SYSTOLIC BLOOD PRESSURE: 155 MMHG

## 2022-11-17 DIAGNOSIS — R03.0 ELEVATED BLOOD PRESSURE READING: ICD-10-CM

## 2022-11-17 DIAGNOSIS — R60.0 SALIVARY GLAND SWELLING: Primary | ICD-10-CM

## 2022-11-17 RX ORDER — AMOXICILLIN AND CLAVULANATE POTASSIUM 875; 125 MG/1; MG/1
1 TABLET, FILM COATED ORAL 2 TIMES DAILY
Qty: 20 TABLET | Refills: 0 | Status: SHIPPED | OUTPATIENT
Start: 2022-11-17 | End: 2022-11-27

## 2022-11-17 RX ORDER — ACETAMINOPHEN 325 MG/1
650 TABLET ORAL
Qty: 40 TABLET | Refills: 0 | Status: SHIPPED | OUTPATIENT
Start: 2022-11-17

## 2022-11-17 NOTE — ED INITIAL ASSESSMENT (HPI)
Pt presents to the IC with c/o left sided facial swelling since this afternoon. Pt was dx with flu on Sunday.

## 2022-11-18 ENCOUNTER — OFFICE VISIT (OUTPATIENT)
Dept: FAMILY MEDICINE CLINIC | Facility: CLINIC | Age: 32
End: 2022-11-18
Payer: MEDICAID

## 2022-11-18 VITALS
SYSTOLIC BLOOD PRESSURE: 129 MMHG | DIASTOLIC BLOOD PRESSURE: 75 MMHG | HEIGHT: 71 IN | HEART RATE: 105 BPM | BODY MASS INDEX: 44.1 KG/M2 | WEIGHT: 315 LBS

## 2022-11-18 DIAGNOSIS — J45.40 MODERATE PERSISTENT ASTHMA WITHOUT COMPLICATION: ICD-10-CM

## 2022-11-18 DIAGNOSIS — J10.1 INFLUENZA A: Primary | ICD-10-CM

## 2022-11-18 DIAGNOSIS — K11.20 SIALOADENITIS: ICD-10-CM

## 2022-11-18 PROCEDURE — 99214 OFFICE O/P EST MOD 30 MIN: CPT | Performed by: FAMILY MEDICINE

## 2022-11-18 PROCEDURE — 3008F BODY MASS INDEX DOCD: CPT | Performed by: FAMILY MEDICINE

## 2022-11-18 PROCEDURE — 3078F DIAST BP <80 MM HG: CPT | Performed by: FAMILY MEDICINE

## 2022-11-18 PROCEDURE — 3074F SYST BP LT 130 MM HG: CPT | Performed by: FAMILY MEDICINE

## 2022-11-18 NOTE — DISCHARGE INSTRUCTIONS
Follow-up with primary care physician in 24-48 hours, call for appointment  You had elevated blood pressure today and you need to follow-up with your doctor for repeat blood pressure check  If possible check your blood pressure at home and keep a blood pressure log to bring to your physician  Return to immediate care or emergency department for any new or worsening symptoms

## 2022-12-02 ENCOUNTER — TELEPHONE (OUTPATIENT)
Dept: PULMONOLOGY | Facility: CLINIC | Age: 32
End: 2022-12-02

## 2022-12-05 RX ORDER — FLUTICASONE FUROATE AND VILANTEROL 200; 25 UG/1; UG/1
1 POWDER RESPIRATORY (INHALATION) DAILY
Qty: 1 EACH | Refills: 11 | Status: CANCELLED | OUTPATIENT
Start: 2022-12-05

## 2022-12-06 NOTE — TELEPHONE ENCOUNTER
Spoke with pharmacist at [facility] DR GILL ESTRADA, [address] or Symbicort are preferred inhalers. Dr. Teresa Jorge is not covered under patient's insurance. [address] or Symbicort are preferred.

## 2022-12-08 RX ORDER — BUDESONIDE AND FORMOTEROL FUMARATE DIHYDRATE 80; 4.5 UG/1; UG/1
2 AEROSOL RESPIRATORY (INHALATION) 2 TIMES DAILY
Qty: 1 EACH | Refills: 5 | Status: SHIPPED | OUTPATIENT
Start: 2022-12-08

## 2022-12-08 NOTE — TELEPHONE ENCOUNTER
Dr. Sonny Ronquillo, patient is requesting refill for Fluticasone furoate-vilanterol.      Last refill: 2/10/22  Last office visit: 2/10/22

## 2022-12-09 RX ORDER — FLUTICASONE FUROATE AND VILANTEROL 200; 25 UG/1; UG/1
POWDER RESPIRATORY (INHALATION)
Qty: 60 EACH | Refills: 0 | Status: SHIPPED | OUTPATIENT
Start: 2022-12-09

## 2022-12-11 ENCOUNTER — PATIENT MESSAGE (OUTPATIENT)
Dept: PULMONOLOGY | Facility: CLINIC | Age: 32
End: 2022-12-11

## 2022-12-13 NOTE — TELEPHONE ENCOUNTER
From: Yoon Telles  To: Sandrine Donald DO  Sent: 12/11/2022 7:55 PM CST  Subject: Symbicort    Cathy I just wanted to check in and see if the symbicort request was sent in to my pharmacy yet. Since I asked for that over the phone when I talked with a nurse at your office over [facility] to replace my Breo for the time being until my appointment in January where we can redo my medication exception for it so that my insurance covers the [facility]. I'm worried that I'll run out of my back up Symbicort from the last time this happened if the request hasn't been sent in yet for a refill. Please send me a message on my chart or leave me a voicemail on my phone in the morning to let me know what's going on with this whole situation. It would be greatly appreciated. Thank you.

## 2022-12-13 NOTE — TELEPHONE ENCOUNTER
Symbicort Rx was sent 12/8/22. Spoke with pharmacy and verified it is going through. They needed to change it to brand only.

## 2022-12-20 ENCOUNTER — PATIENT MESSAGE (OUTPATIENT)
Dept: PULMONOLOGY | Facility: CLINIC | Age: 32
End: 2022-12-20

## 2022-12-21 RX ORDER — BUDESONIDE AND FORMOTEROL FUMARATE DIHYDRATE 160; 4.5 UG/1; UG/1
2 AEROSOL RESPIRATORY (INHALATION) 2 TIMES DAILY
Qty: 10.2 G | Refills: 5 | Status: SHIPPED | OUTPATIENT
Start: 2022-12-21

## 2022-12-21 NOTE — TELEPHONE ENCOUNTER
LOV: 2/10/22  Last refill: 12/8/22 (wrong dose)    Dr. Acevedo Dad- patient states Symbicort dose is incorrect, requesting new prescription for Symbicort 160 dose. Please review and sign pending order if agreeable.

## 2022-12-23 ENCOUNTER — TELEPHONE (OUTPATIENT)
Dept: PULMONOLOGY | Facility: CLINIC | Age: 32
End: 2022-12-23

## 2022-12-27 NOTE — TELEPHONE ENCOUNTER
Spoke with [address] technician states patient picked up medication on 12/21/22. Nothing further needed from pulmo office at this time.

## 2023-01-19 ENCOUNTER — OFFICE VISIT (OUTPATIENT)
Dept: PULMONOLOGY | Facility: CLINIC | Age: 33
End: 2023-01-19

## 2023-01-19 VITALS — RESPIRATION RATE: 16 BRPM | BODY MASS INDEX: 44.1 KG/M2 | OXYGEN SATURATION: 94 % | WEIGHT: 315 LBS | HEIGHT: 71 IN

## 2023-01-19 DIAGNOSIS — J45.20 MILD INTERMITTENT ASTHMA WITHOUT COMPLICATION: Primary | ICD-10-CM

## 2023-01-19 PROCEDURE — 99214 OFFICE O/P EST MOD 30 MIN: CPT | Performed by: INTERNAL MEDICINE

## 2023-01-19 PROCEDURE — 3008F BODY MASS INDEX DOCD: CPT | Performed by: INTERNAL MEDICINE

## 2023-01-19 RX ORDER — FLUTICASONE FUROATE AND VILANTEROL 200; 25 UG/1; UG/1
1 POWDER RESPIRATORY (INHALATION) DAILY
Qty: 1 EACH | Refills: 5 | Status: SHIPPED | OUTPATIENT
Start: 2023-01-19

## 2023-01-19 NOTE — PROGRESS NOTES
Referring Physician  Vilma Muniz DO    History of Present Illness  Patient seen today for follow-up visit to pulmonary clinic. Asthma symptoms relatively well controlled over the course last year. Denies significant exacerbations requiring hospitalization. No significant wheezing or cough. Compliant with Spiriva and and currently on Symbicort. States symptoms on Symbicort are not as well-controlled as while on Breo. Medications  Budesonide-Formoterol Fumarate (SYMBICORT) 160-4.5 MCG/ACT Inhalation Aerosol, Inhale 2 puffs into the lungs 2 (two) times daily. , Disp: 10.2 g, Rfl: 5  Budesonide-Formoterol Fumarate (SYMBICORT) 80-4.5 MCG/ACT Inhalation Aerosol, Inhale 2 puffs into the lungs 2 (two) times daily. , Disp: 1 each, Rfl: 5  acetaminophen 325 MG Oral Tab, Take 2 tablets (650 mg total) by mouth every 4 to 6 hours as needed for Pain or Fever., Disp: 40 tablet, Rfl: 0  metoclopramide (REGLAN) 10 MG Oral Tab, Take 1 tablet (10 mg total) by mouth every 6 (six) hours as needed. , Disp: 90 tablet, Rfl: 0  albuterol 108 (90 Base) MCG/ACT Inhalation Aero Soln, Inhale 2 puffs into the lungs 4 (four) times daily as needed for Wheezing., Disp: 1 each, Rfl: 3  predniSONE 20 MG Oral Tab, Take 20 mg by mouth in the morning., Disp: , Rfl:   Cetirizine HCl 10 MG Oral Cap, Take 10 mg by mouth daily. , Disp: , Rfl:   Omeprazole 40 MG Oral Capsule Delayed Release, Take 1 capsule (40 mg total) by mouth every morning before breakfast., Disp: 90 capsule, Rfl: 3  valsartan 80 MG Oral Tab, Take 1 tablet (80 mg total) by mouth once daily. , Disp: 90 tablet, Rfl: 2  cetirizine (EQ ALLERGY RELIEF, CETIRIZINE,) 10 MG Oral Tab, Take 1 tablet (10 mg total) by mouth daily.  TAKE 1 TABLET BY MOUTH ONCE DAILY FOR ALLERGY SYMPTOMS, Disp: 90 tablet, Rfl: 2  fluticasone propionate 50 MCG/ACT Nasal Suspension, USE 2 SPRAY(S) IN EACH NOSTRIL ONCE DAILY FOR 30 DAYS, Disp: 16 g, Rfl: 5  Tiotropium Bromide Monohydrate (SPIRIVA RESPIMAT) 1.25 MCG/ACT Inhalation Aero Soln, Inhale 2 puffs into the lungs daily. , Disp: 1 each, Rfl: 11  fluticasone furoate-vilanterol (BREO ELLIPTA) 200-25 MCG/INH Inhalation Aerosol Powder, Breath Activated, Inhale 1 puff into the lungs daily. , Disp: 1 each, Rfl: 11  zolpidem 10 MG Oral Tab, Take 1 tablet (10 mg total) by mouth nightly as needed for Sleep., Disp: 30 tablet, Rfl: 2  Tiotropium Bromide Monohydrate (SPIRIVA RESPIMAT) 1.25 MCG/ACT Inhalation Aero Soln, Inhale 2 puffs into the lungs daily. , Disp: 1 each, Rfl: 2  LORazepam 1 MG Oral Tab, Take 1 tablet (1 mg total) by mouth daily as needed for Anxiety. , Disp: 5 tablet, Rfl: 0  QUEtiapine Fumarate 100 MG Oral Tab, Take 100 mg by mouth nightly., Disp: , Rfl:   Simethicone (GAS-X OR), , Disp: , Rfl:   SF 1.1 % Dental Gel, BRUSH ON TEETH IN EVENING BEFORE BED AND EXPETORATE. DO NOT RINSE, Disp: , Rfl: 5  gabapentin 300 MG Oral Cap, TAKE 1 CAPSULE BY MOUTH TWICE DAILY, Disp: , Rfl: 0  Loperamide HCl (IMODIUM A-D OR), Take by mouth daily. , Disp: , Rfl:   ergocalciferol 32264 units Oral Cap, once a week.  , Disp: , Rfl: 0    No current facility-administered medications on file prior to visit. Allergies    Haldol [Haloperidol]    OTHER (SEE COMMENTS)    Comment:Cannot sleep, psychiatric problem  Mucinex Dm Maximum *    OTHER (SEE COMMENTS)    Comment:Stomach cramps  Zofran [Ondansetron]    OTHER (SEE COMMENTS)    Comment:constipation    Physical Exam  Constitutional: no acute distress  HEENT: PERRL  Cardio: RRR, S1 S2  Respiratory: clear to auscultation bilaterally, no wheezing, rales, rhonchi, crackles  GI: abdomen soft, non tender  Extremities: no clubbing, cyanosis, edema  Neurologic: no gross motor deficits  Skin: warm, dry  Lymphatic: no supraclavicular lymphadenopathy     Assessment  1. Extrinsic asthma  2. Possible BERNARDO  3.  Obesity      Plan  -Patient seen today for follow-up visit for management of asthma. Asthma symptoms well controlled at this time. Advised patient to continue Spiriva and ICS/LABA. Symptoms not as optimally controlled while on Symbicort compared to [facility]. Requesting Breo which I have ordered for the patient since she has had a better response. I urged the patient to avoid known triggers.     Nora Wilson, DO  Pulmonary Medicine  [facility], [facility]

## 2023-01-23 ENCOUNTER — TELEPHONE (OUTPATIENT)
Dept: PULMONOLOGY | Facility: CLINIC | Age: 33
End: 2023-01-23

## 2023-01-23 NOTE — TELEPHONE ENCOUNTER
[address] is calling to offer different inhalers that are offered to substitute the Breo for coverage    ANIYAH SPARKS JR. [facility] and [facility]

## 2023-01-24 NOTE — TELEPHONE ENCOUNTER
We will need to get prior authorization because the patient was on Symbicort in the past and did not respond as well to Symbicort. I mentioned that in my note also.

## 2023-01-26 NOTE — TELEPHONE ENCOUNTER
Spoke to Christina Lynch team and PA will be faxed to [email or web address] for completion. Per PA team-Air[employer] is covered, IF Marv Jordan is denied.

## 2023-01-30 NOTE — TELEPHONE ENCOUNTER
PA completed and faxed. APPROVAL received-Authorized through 1/30/2024. Spoke to [employer] and informed them of approval and to re-process script.

## 2023-02-27 NOTE — TELEPHONE ENCOUNTER
LOV: 1/19/2023  Last refill: 2/10/22    Dr. Mayito Jorge review and sign pended prescription if agreeable.

## 2023-02-28 RX ORDER — TIOTROPIUM BROMIDE INHALATION SPRAY 1.56 UG/1
SPRAY, METERED RESPIRATORY (INHALATION)
Qty: 4 G | Refills: 2 | Status: SHIPPED | OUTPATIENT
Start: 2023-02-28

## 2023-03-03 ENCOUNTER — LAB ENCOUNTER (OUTPATIENT)
Dept: LAB | Age: 33
End: 2023-03-03
Attending: INTERNAL MEDICINE
Payer: MEDICAID

## 2023-03-03 DIAGNOSIS — Z01.818 PRE-OP TESTING: ICD-10-CM

## 2023-03-04 ENCOUNTER — PATIENT MESSAGE (OUTPATIENT)
Facility: CLINIC | Age: 33
End: 2023-03-04

## 2023-03-04 LAB — SARS-COV-2 RNA RESP QL NAA+PROBE: NOT DETECTED

## 2023-03-05 ENCOUNTER — TELEPHONE (OUTPATIENT)
Dept: GASTROENTEROLOGY | Facility: CLINIC | Age: 33
End: 2023-03-05

## 2023-03-05 NOTE — TELEPHONE ENCOUNTER
Pt is sick and would like to cancel is procedure with Dr. Martin Greene. Dr. Martin Greene and our staff will be informed so the procedure can be rescheduled.     Nely Lopez MD  [address] [facility]

## 2023-03-07 NOTE — TELEPHONE ENCOUNTER
From: Jayesh Renee  To: Sabrina Marino MD  Sent: 3/4/2023 6:39 PM CST  Subject: Possible Procedure Cancelation/Rescheduling    I wanted to give a quick heads up that I might need to reschedule my Monday appointment for an endoscopy. I've started to have some breathing trouble (obviously likely just an Asthma flare up due to the weather or the onset of a cold since I tested negative for Covid). Because I can't drink after Midnight on Sunday into Monday it might worsen because my breathing is heavily impacted by how dry my throat is. I don't know 100% for sure if I'll need to cancel or not but I wanted to send this heads up just in case since I don't know if scheduling is even open for calls during the weekend.

## 2023-03-17 NOTE — TELEPHONE ENCOUNTER
Spoke with Patient states he doesn't know what his schedule would be like he doesn't want to schedule right now he will call back when hes ready.     T/E closed

## 2023-03-28 RX ORDER — FLUTICASONE PROPIONATE 50 MCG
SPRAY, SUSPENSION (ML) NASAL
Qty: 16 G | Refills: 3 | Status: SHIPPED | OUTPATIENT
Start: 2023-03-28

## 2023-04-29 ENCOUNTER — PATIENT MESSAGE (OUTPATIENT)
Dept: FAMILY MEDICINE CLINIC | Facility: CLINIC | Age: 33
End: 2023-04-29

## 2023-05-01 NOTE — TELEPHONE ENCOUNTER
From: Mick Telles  To: Carola Crain DO  Sent: 4/29/2023 4:24 PM CDT  Subject: Covid Card    I've lost my covid vaccine card. Is there a way for me to get sent a new one ASAP?

## 2023-05-31 NOTE — ED QUICK NOTES
----- Message from Rosa Jay NP sent at 5/30/2023  9:40 PM CDT -----  Same meds triglycerides are up everything else is stable   Pt called via call light - notes he feels 'it's getting worse'. Spoke about repeat duoneb. Pt wants to know if that's what the doctor wants. MD notified.

## 2023-06-05 RX ORDER — FLUTICASONE PROPIONATE 50 MCG
SPRAY, SUSPENSION (ML) NASAL
Qty: 16 G | Refills: 3 | Status: SHIPPED | OUTPATIENT
Start: 2023-06-05

## 2023-06-05 NOTE — TELEPHONE ENCOUNTER
Refill passed per [facility], [facility] protocol.      Requested Prescriptions   Pending Prescriptions Disp Refills    fluticasone propionate 50 MCG/ACT Nasal Suspension 16 g 3     Sig: Use 2 spray(s) in each nostril once daily       Allergy Medication Protocol Passed - 6/5/2023  3:41 AM        Passed - In person appointment or virtual visit in the past 12 mos or appointment in next 3 mos     Recent Outpatient Visits              4 months ago Mild intermittent asthma without complication    [facility], [address] [place], [place]    Office Visit    6 months ago Influenza A    [address] [place], [place]    Office Visit    7 months ago Gastroesophageal reflux disease with esophagitis, unspecified whether hemorrhage    [facility], [address], KJ Zuleta MD    Office Visit    8 months ago Bilateral impacted cerumen    [address] [facility] Rachid Schuler, APRN    Office Visit    8 months ago Impacted cerumen of left ear    [address] [facility]     Office Visit                                 Recent Outpatient Visits              4 months ago Mild intermittent asthma without complication    [facility], [address] [place], [place]    Office Visit    6 months ago Influenza A    [address] [place], [place]    Office Visit    7 months ago Gastroesophageal reflux disease with esophagitis, unspecified whether hemorrhage    [facility], [address], KJ Zuleta MD    Office Visit    8 months ago Bilateral impacted cerumen    [address] [facility] Rachid Schuler, APRN    Office Visit    8 months ago Impacted cerumen of left ear    [facility], [address] [facility] Nigel Toribio, [place], [place]    Office Visit

## 2023-06-06 RX ORDER — TIOTROPIUM BROMIDE INHALATION SPRAY 1.56 UG/1
2 SPRAY, METERED RESPIRATORY (INHALATION) DAILY
Qty: 4 G | Refills: 5 | Status: SHIPPED | OUTPATIENT
Start: 2023-06-06

## 2023-08-01 RX ORDER — FLUTICASONE FUROATE AND VILANTEROL 200; 25 UG/1; UG/1
1 POWDER RESPIRATORY (INHALATION) DAILY
Qty: 1 EACH | Refills: 1 | Status: SHIPPED | OUTPATIENT
Start: 2023-08-01

## 2023-08-04 ENCOUNTER — HOSPITAL ENCOUNTER (OUTPATIENT)
Age: 33
Discharge: ANOTHER HOSPITAL | End: 2023-08-04
Payer: MEDICAID

## 2023-08-04 ENCOUNTER — HOSPITAL ENCOUNTER (EMERGENCY)
Facility: HOSPITAL | Age: 33
Discharge: HOME | End: 2023-08-04
Attending: EMERGENCY MEDICINE
Payer: MEDICAID

## 2023-08-04 VITALS
OXYGEN SATURATION: 97 % | RESPIRATION RATE: 18 BRPM | TEMPERATURE: 97 F | SYSTOLIC BLOOD PRESSURE: 144 MMHG | DIASTOLIC BLOOD PRESSURE: 74 MMHG | HEART RATE: 89 BPM

## 2023-08-04 VITALS
BODY MASS INDEX: 45.1 KG/M2 | WEIGHT: 315 LBS | DIASTOLIC BLOOD PRESSURE: 67 MMHG | SYSTOLIC BLOOD PRESSURE: 120 MMHG | RESPIRATION RATE: 24 BRPM | OXYGEN SATURATION: 95 % | HEART RATE: 100 BPM | TEMPERATURE: 97 F | HEIGHT: 70 IN

## 2023-08-04 DIAGNOSIS — R11.2 NAUSEA AND VOMITING, UNSPECIFIED VOMITING TYPE: Primary | ICD-10-CM

## 2023-08-04 DIAGNOSIS — R11.2 NAUSEA AND VOMITING IN ADULT: Primary | ICD-10-CM

## 2023-08-04 LAB
ALBUMIN SERPL-MCNC: 3.9 G/DL (ref 3.4–5)
ALBUMIN/GLOB SERPL: 0.8 {RATIO} (ref 1–2)
ALP LIVER SERPL-CCNC: 134 U/L
ALT SERPL-CCNC: 28 U/L
ANION GAP SERPL CALC-SCNC: 4 MMOL/L (ref 0–18)
AST SERPL-CCNC: 20 U/L (ref 15–37)
BASOPHILS # BLD AUTO: 0.01 X10(3) UL (ref 0–0.2)
BASOPHILS NFR BLD AUTO: 0.1 %
BILIRUB SERPL-MCNC: 0.5 MG/DL (ref 0.1–2)
BUN BLD-MCNC: 8 MG/DL (ref 7–18)
BUN/CREAT SERPL: 7.8 (ref 10–20)
CALCIUM BLD-MCNC: 9.7 MG/DL (ref 8.5–10.1)
CHLORIDE SERPL-SCNC: 105 MMOL/L (ref 98–112)
CO2 SERPL-SCNC: 30 MMOL/L (ref 21–32)
CREAT BLD-MCNC: 1.02 MG/DL
DEPRECATED RDW RBC AUTO: 48.1 FL (ref 35.1–46.3)
EGFRCR SERPLBLD CKD-EPI 2021: 100 ML/MIN/1.73M2 (ref 60–?)
EOSINOPHIL # BLD AUTO: 0.01 X10(3) UL (ref 0–0.7)
EOSINOPHIL NFR BLD AUTO: 0.1 %
ERYTHROCYTE [DISTWIDTH] IN BLOOD BY AUTOMATED COUNT: 15.9 % (ref 11–15)
GLOBULIN PLAS-MCNC: 4.7 G/DL (ref 2.8–4.4)
GLUCOSE BLD-MCNC: 108 MG/DL (ref 70–99)
HCT VFR BLD AUTO: 44 %
HGB BLD-MCNC: 14.2 G/DL
IMM GRANULOCYTES # BLD AUTO: 0.03 X10(3) UL (ref 0–1)
IMM GRANULOCYTES NFR BLD: 0.3 %
LIPASE SERPL-CCNC: 15 U/L (ref 13–75)
LYMPHOCYTES # BLD AUTO: 1.05 X10(3) UL (ref 1–4)
LYMPHOCYTES NFR BLD AUTO: 9.5 %
MCH RBC QN AUTO: 26.7 PG (ref 26–34)
MCHC RBC AUTO-ENTMCNC: 32.3 G/DL (ref 31–37)
MCV RBC AUTO: 82.7 FL
MONOCYTES # BLD AUTO: 0.44 X10(3) UL (ref 0.1–1)
MONOCYTES NFR BLD AUTO: 4 %
NEUTROPHILS # BLD AUTO: 9.51 X10 (3) UL (ref 1.5–7.7)
NEUTROPHILS # BLD AUTO: 9.51 X10(3) UL (ref 1.5–7.7)
NEUTROPHILS NFR BLD AUTO: 86 %
OSMOLALITY SERPL CALC.SUM OF ELEC: 287 MOSM/KG (ref 275–295)
PLATELET # BLD AUTO: 376 10(3)UL (ref 150–450)
POTASSIUM SERPL-SCNC: 3.9 MMOL/L (ref 3.5–5.1)
PROT SERPL-MCNC: 8.6 G/DL (ref 6.4–8.2)
RBC # BLD AUTO: 5.32 X10(6)UL
SODIUM SERPL-SCNC: 139 MMOL/L (ref 136–145)
WBC # BLD AUTO: 11.1 X10(3) UL (ref 4–11)

## 2023-08-04 PROCEDURE — 96374 THER/PROPH/DIAG INJ IV PUSH: CPT

## 2023-08-04 PROCEDURE — 99214 OFFICE O/P EST MOD 30 MIN: CPT | Performed by: NURSE PRACTITIONER

## 2023-08-04 PROCEDURE — 85025 COMPLETE CBC W/AUTO DIFF WBC: CPT | Performed by: EMERGENCY MEDICINE

## 2023-08-04 PROCEDURE — 99284 EMERGENCY DEPT VISIT MOD MDM: CPT

## 2023-08-04 PROCEDURE — 96361 HYDRATE IV INFUSION ADD-ON: CPT

## 2023-08-04 PROCEDURE — 80053 COMPREHEN METABOLIC PANEL: CPT | Performed by: EMERGENCY MEDICINE

## 2023-08-04 PROCEDURE — 83690 ASSAY OF LIPASE: CPT | Performed by: EMERGENCY MEDICINE

## 2023-08-04 RX ORDER — LORAZEPAM 2 MG/ML
0.5 INJECTION INTRAMUSCULAR ONCE
Status: COMPLETED | OUTPATIENT
Start: 2023-08-04 | End: 2023-08-04

## 2023-08-04 RX ORDER — MECLIZINE HYDROCHLORIDE 25 MG/1
25 TABLET ORAL ONCE
Status: DISCONTINUED | OUTPATIENT
Start: 2023-08-04 | End: 2023-08-04

## 2023-08-04 NOTE — ED INITIAL ASSESSMENT (HPI)
Pt reports vomiting and chills, which started this morning. \"I started throwing up this morning and it hasn't stopped, I can't keep liquids down and I can't keep solids down\".  Vomiting started 5 minutes after breakfast.    Cannot take zofran, reglan, and haldol

## 2023-08-04 NOTE — ED INITIAL ASSESSMENT (HPI)
Pt went to [address] [place] [address],[address] for n/v since breakfast today, sts he was sent to ER b/c IC did not have much tx for his n/v since he is allergic to zofran/reglan/haldol

## 2023-08-24 ENCOUNTER — OFFICE VISIT (OUTPATIENT)
Dept: PULMONOLOGY | Facility: CLINIC | Age: 33
End: 2023-08-24

## 2023-08-24 VITALS
DIASTOLIC BLOOD PRESSURE: 76 MMHG | OXYGEN SATURATION: 95 % | WEIGHT: 315 LBS | SYSTOLIC BLOOD PRESSURE: 128 MMHG | HEIGHT: 71 IN | BODY MASS INDEX: 44.1 KG/M2 | RESPIRATION RATE: 18 BRPM | HEART RATE: 102 BPM

## 2023-08-24 DIAGNOSIS — J45.20 MILD INTERMITTENT EXTRINSIC ASTHMA WITHOUT COMPLICATION: Primary | ICD-10-CM

## 2023-08-24 PROCEDURE — 3008F BODY MASS INDEX DOCD: CPT | Performed by: INTERNAL MEDICINE

## 2023-08-24 PROCEDURE — 3074F SYST BP LT 130 MM HG: CPT | Performed by: INTERNAL MEDICINE

## 2023-08-24 PROCEDURE — 3078F DIAST BP <80 MM HG: CPT | Performed by: INTERNAL MEDICINE

## 2023-08-24 PROCEDURE — 99213 OFFICE O/P EST LOW 20 MIN: CPT | Performed by: INTERNAL MEDICINE

## 2023-08-24 RX ORDER — FLUTICASONE FUROATE AND VILANTEROL 200; 25 UG/1; UG/1
1 POWDER RESPIRATORY (INHALATION) DAILY
Qty: 1 EACH | Refills: 7 | Status: SHIPPED | OUTPATIENT
Start: 2023-08-24

## 2023-08-24 RX ORDER — TIOTROPIUM BROMIDE INHALATION SPRAY 1.56 UG/1
2 SPRAY, METERED RESPIRATORY (INHALATION) DAILY
Qty: 1 EACH | Refills: 5 | Status: SHIPPED | OUTPATIENT
Start: 2023-08-24

## 2023-08-24 NOTE — PROGRESS NOTES
Referring Physician  Hannah Estrada DO    History of Present Illness  Patient seen today for follow-up visit to pulmonary clinic. Asthma symptoms relatively well controlled over the course last year. Denies significant exacerbations requiring hospitalization. No significant wheezing or cough. Compliant with Spiriva and and Breo. Medications  fluticasone furoate-vilanterol (BREO ELLIPTA) 200-25 MCG/ACT Inhalation Aerosol Powder, Breath Activated, Inhale 1 puff into the lungs daily. , Disp: 1 each, Rfl: 1  Tiotropium Bromide Monohydrate (SPIRIVA RESPIMAT) 1.25 MCG/ACT Inhalation Aero Soln, Use as directed 2 sprays in the mouth or throat daily. , Disp: 4 g, Rfl: 5  valsartan 80 MG Oral Tab, Take 1 tablet (80 mg total) by mouth once daily. , Disp: 90 tablet, Rfl: 1  cetirizine (EQ ALLERGY RELIEF, CETIRIZINE,) 10 MG Oral Tab, Take 1 tablet (10 mg total) by mouth daily. TAKE 1 TABLET BY MOUTH ONCE DAILY FOR ALLERGY SYMPTOMS, Disp: 90 tablet, Rfl: 1  fluticasone propionate 50 MCG/ACT Nasal Suspension, Use 2 spray(s) in each nostril once daily, Disp: 16 g, Rfl: 3  albuterol 108 (90 Base) MCG/ACT Inhalation Aero Soln, Inhale 2 puffs into the lungs 4 (four) times daily as needed for Wheezing., Disp: 1 each, Rfl: 3  Omeprazole 40 MG Oral Capsule Delayed Release, Take 1 capsule (40 mg total) by mouth every morning before breakfast., Disp: 90 capsule, Rfl: 3  QUEtiapine Fumarate 100 MG Oral Tab, Take 1 tablet (100 mg total) by mouth nightly., Disp: , Rfl:   Simethicone (GAS-X OR), , Disp: , Rfl:   SF 1.1 % Dental Gel, BRUSH ON TEETH IN EVENING BEFORE BED AND EXPETORATE. DO NOT RINSE, Disp: , Rfl: 5  gabapentin 300 MG Oral Cap, TAKE 1 CAPSULE BY MOUTH TWICE DAILY, Disp: , Rfl: 0  Loperamide HCl (IMODIUM A-D OR), Take by mouth daily. , Disp: , Rfl:   ergocalciferol 57612 units Oral Cap, once a week.  , Disp: , Rfl: 0  metoclopramide (REGLAN) 10 MG Oral Tab, Take 1 tablet (10 mg total) by mouth every 6 (six) hours as needed. , Disp: 90 tablet, Rfl: 0  predniSONE 20 MG Oral Tab, Take 1 tablet (20 mg total) by mouth daily. prn (Patient not taking: Reported on 8/24/2023), Disp: , Rfl:     No current facility-administered medications on file prior to visit. Allergies    Haldol [Haloperidol]    OTHER (SEE COMMENTS)    Comment:Cannot sleep, psychiatric problem  Mucinex Dm Maximum *    OTHER (SEE COMMENTS)    Comment:Stomach cramps  Reglan [Metoclopram*    OTHER (SEE COMMENTS)    Comment:Per doctor, patient cannot take. Pt is on             seroquel, and it is worsening his tremors  Zofran [Ondansetron]    OTHER (SEE COMMENTS)    Comment:constipation    Physical Exam  Constitutional: no acute distress  HEENT: PERRL  Cardio: RRR, S1 S2  Respiratory: clear to auscultation bilaterally, no wheezing, rales, rhonchi, crackles  GI: abdomen soft, non tender  Extremities: no clubbing, cyanosis, edema  Neurologic: no gross motor deficits  Skin: warm, dry  Lymphatic: no supraclavicular lymphadenopathy     Assessment  1. Extrinsic asthma  2. Possible BERNARDO  3.  Obesity      Plan  -Patient seen today for follow-up visit for management of asthma. Asthma symptoms well controlled at this time. Advised patient to continue Spiriva and Breo. Urged avoidance of known triggers.     Ahmet Feliciano,   Pulmonary Medicine  [facility], [facility]

## 2023-11-20 RX ORDER — OMEPRAZOLE 40 MG/1
40 CAPSULE, DELAYED RELEASE ORAL
Qty: 90 CAPSULE | Refills: 3 | Status: SHIPPED | OUTPATIENT
Start: 2023-11-20

## 2023-11-20 NOTE — TELEPHONE ENCOUNTER
Requested Prescriptions     Pending Prescriptions Disp Refills    Omeprazole 40 MG Oral Capsule Delayed Release 90 capsule 3     Sig: Take 1 capsule (40 mg total) by mouth every morning before breakfast.        LOV   10/31/2022    LR  10/31/2022    Routed to Office on call     Sb      Patient is requesting a 30 day supply with several refills and not a 90 day refill with a few refills

## 2023-12-07 ENCOUNTER — NURSE TRIAGE (OUTPATIENT)
Dept: FAMILY MEDICINE CLINIC | Facility: CLINIC | Age: 33
End: 2023-12-07

## 2023-12-11 RX ORDER — FLUTICASONE PROPIONATE 50 MCG
SPRAY, SUSPENSION (ML) NASAL
Qty: 16 G | Refills: 3 | OUTPATIENT
Start: 2023-12-11

## 2023-12-11 NOTE — TELEPHONE ENCOUNTER
Please review; protocol failed.   Message sent for patient to make an appointment.     Requested Prescriptions   Pending Prescriptions Disp Refills    fluticasone propionate 50 MCG/ACT Nasal Suspension 16 g 3     Sig: Use 2 spray(s) in each nostril once daily       Allergy Medication Protocol Failed - 12/7/2023  4:14 PM        Failed - In person appointment or virtual visit in the past 12 mos or appointment in next 3 mos     Recent Outpatient Visits              3 months ago Mild intermittent extrinsic asthma without complication    [facility]    Office [facility]    10 months ago Mild intermittent asthma without complication    [facility] [place]    [facility]    1 [facility]    [facility]    1 year ago Gastroesophageal reflux disease with esophagitis, unspecified whether hemorrhage    [facility]    [facility]    1 year ago Bilateral impacted cerumen    [facility] Dafne Marcial APRN    Office Visit                         Recent Outpatient Visits              3 months ago Mild intermittent extrinsic asthma without complication    [facility] Ajit Zhang, DO    Office Visit    10 months ago Mild intermittent asthma without complication    [facility]    Office Visit    1 year ago Influenza A    [facility]    [facility] Visit    1 year ago Gastroesophageal reflux disease with esophagitis, unspecified whether harriett    [facility] Gabbi Michelle MD    Office Visit    1  year ago Bilateral impacted amelia [facility], [address], [place] Dafne Marcial APRN    Office Visit

## 2023-12-11 NOTE — TELEPHONE ENCOUNTER
Refill refused  Please call patient to schedule follow-up with Dr. Corrales for further refills.

## 2023-12-18 NOTE — TELEPHONE ENCOUNTER
Action Requested: Summary for Provider     []  Critical Lab, Recommendations Needed  [] Need Additional Advice  [x]   FYI    []   Need Orders  [] Need Medications Sent to Pharmacy  []  Other     SUMMARY: Declines sooner appointment for leg swelling.     Reason for call: Refill Request and leg swelling  Onset: Leg swelling for past few months.     Patient called office. Date of birth and full name both confirmed.   Returning our call.   Advised appointment needed for fluticasone refill.  And also mentioned leg swelling.   Triaged per protocol and advised care advice per protocol.   More details regarding Symptoms under Additional Documentation > Protocols Used.     Evaluation advised today. - declines does not want appt this week.   Agreeable to 12/28/23 appointment. .     Advised to monitor symptoms.  RN advised if symptoms get severely worse, patient should seek care at Emergency Room or Immediate Care.  RN also informed patient to seek immediate medical attention at ER if patient experiences severe/worsening symptoms, shortness of breath, chest pain, or severe pain.  Patient verbalizes understanding and is agreeable to instructions.     Future Appointments   Date Time Provider Department Center   12/28/2023 11:45 AM Jovon Corrales DO [facility]         Reason for Disposition   MODERATE swelling of both ankles (e.g., swelling extends up to the knees) AND new-onset or worsening    Protocols used: Leg Swelling and Edema-A-[place]

## 2024-01-02 NOTE — TELEPHONE ENCOUNTER
Patient contacts clinic regarding cetrizine refill.  State he had to reschedule his appointment due to multiple scheduling conflicts.  He is currently scheduled for follow up on 01/23 and reports he will keep this appointment.  States he will suffer severe symptoms if he does not get his refill, requesting 1 month supply only.  Please advise on refill request.  Routed to provider and pod mate due to needing the rx today.

## 2024-01-03 RX ORDER — CETIRIZINE HYDROCHLORIDE 10 MG/1
10 TABLET ORAL DAILY
Qty: 30 TABLET | Refills: 0 | Status: SHIPPED | OUTPATIENT
Start: 2024-01-03

## 2024-01-03 RX ORDER — CETIRIZINE HYDROCHLORIDE 10 MG/1
10 TABLET ORAL DAILY
Qty: 30 TABLET | Refills: 0 | OUTPATIENT
Start: 2024-01-03

## 2024-01-03 NOTE — TELEPHONE ENCOUNTER
Refill passed per [facility] protocol.  Requested Prescriptions   Pending Prescriptions Disp Refills    EQ ALLERGY RELIEF, CETIRIZINE, 10 MG Oral Tab [Pharmacy Med Name: EQ Allergy Relief (Cetirizine) 10 MG Oral Tablet] 90 tablet 0     Sig: TAKE 1 TABLET BY MOUTH ONCE DAILY FOR ALLERGIES       Allergy Medication Protocol Passed - 1/2/2024  6:21 PM        Passed - In person appointment or virtual visit in the past 12 mos or appointment in next 3 mos     Recent Outpatient Visits              4 months ago Mild intermittent extrinsic asthma without complication    [facility]    Office Visit    11 months ago Mild intermittent asthma without complication    [facility]    [facility]    1 year ago Influenza A    [facility] Jovon Corrales,     Office Visit    1 year ago Gastroesophageal reflux disease with esophagitis, unspecified whether hemorrhage    [facility] Gabbi Duvall MD    Office Visit    1 year ago Bilateral impacted cerumen    [facility] Dafne Marcial APRN    Office Visit          Future Appointments         Provider Department Appt Notes    In 2 weeks Jovon Corrales DO [facility] leg swelling, fluticasone refill, declines sooner appt                  Future Appointments         Provider Department Appt Notes    In 2 weeks Jovon Corrales DO [facility] leg swelling, fluticasone refill, declines sooner appt          Recent Outpatient Visits              4 months ago Mild intermittent extrinsic asthma without complication    [facility] Ajit Urbina, DO    Office Visit    11 months ago  Mild intermittent asthma without complication    [facility] [place] Ajit Urbina, DO    Office Visit    1 year ago Influenza A    [facility], [address], [place] Jovon Corrales,     Office Visit    1 year ago Gastroesophageal reflux disease with esophagitis, unspecified whether hemorrhage    [facility]    Office Visit    1 year ago Bilateral impacted cerumen    [facility] [place] Dfane Marcial APRN    Office Visit

## 2024-01-04 ENCOUNTER — TELEPHONE (OUTPATIENT)
Dept: PULMONOLOGY | Facility: CLINIC | Age: 34
End: 2024-01-04

## 2024-01-04 NOTE — TELEPHONE ENCOUNTER
Fax from [facility] requesting updated pa for     fluticasone furoate-vilanterol (BREO ELLIPTA) 200-25 MCG/ACT Inhalation Aerosol Powder, Breath Activated, Inhale 1 puff into the lungs daily. Rinse your mouth with water without swallowing after using BREO to help reduce your chance of getting thrush., Disp: 1 each, Rfl: 7    KEY: [id number]

## 2024-01-05 ENCOUNTER — TELEPHONE (OUTPATIENT)
Dept: FAMILY MEDICINE CLINIC | Facility: CLINIC | Age: 34
End: 2024-01-05

## 2024-01-05 NOTE — TELEPHONE ENCOUNTER
Spoke with patient regarding Seroquel medication. Advised to check with pharmacy later today, and to call the psychiatrist office to make an appt. Patient verbalized understanding.       Spoke with Jessie at psychiatrist office ([phone]). Jessie advised message from RN stated his provider was out of the office, but a few pills were going to be sent to pharmacy. In addition, patient was due for a follow up. This RN asked for the nurse to give patient a call with an update regarding the medication. Advised this RN would call patient as well with an update.         Patient states he has been trying to contact his psychiatrist office for a refill on the Seroquel 100mg with no success. Patient states he is currently out of the medication and is unsure what to do.  Advised this RN would try to attempt to make contact with psychiatrist office for an update on his medication. Patient verbalized understanding.

## 2024-01-09 NOTE — TELEPHONE ENCOUNTER
Prior authorization approved, per Cover My Meds:    Outcome  Approved today  The case has been Approved from [id number]  Authorization Expiration Date: 1/30/2025

## 2024-01-23 ENCOUNTER — OFFICE VISIT (OUTPATIENT)
Dept: FAMILY MEDICINE CLINIC | Facility: CLINIC | Age: 34
End: 2024-01-23

## 2024-01-23 VITALS
OXYGEN SATURATION: 95 % | HEART RATE: 102 BPM | DIASTOLIC BLOOD PRESSURE: 81 MMHG | BODY MASS INDEX: 44.1 KG/M2 | SYSTOLIC BLOOD PRESSURE: 139 MMHG | HEIGHT: 71 IN | WEIGHT: 315 LBS

## 2024-01-23 DIAGNOSIS — I10 ESSENTIAL HYPERTENSION: Primary | ICD-10-CM

## 2024-01-23 DIAGNOSIS — J45.40 MODERATE PERSISTENT ASTHMA WITHOUT COMPLICATION: ICD-10-CM

## 2024-01-23 DIAGNOSIS — R79.89 LOW SERUM VITAMIN D: ICD-10-CM

## 2024-01-23 DIAGNOSIS — E66.01 CLASS 3 SEVERE OBESITY DUE TO EXCESS CALORIES WITHOUT SERIOUS COMORBIDITY WITH BODY MASS INDEX (BMI) OF 50.0 TO 59.9 IN ADULT (HCC): ICD-10-CM

## 2024-01-23 DIAGNOSIS — R60.0 BILATERAL LEG EDEMA: ICD-10-CM

## 2024-01-23 PROBLEM — E66.813 CLASS 3 SEVERE OBESITY WITH BODY MASS INDEX (BMI) OF 50.0 TO 59.9 IN ADULT (HCC): Status: ACTIVE | Noted: 2024-01-23

## 2024-01-23 PROBLEM — E66.813 CLASS 3 SEVERE OBESITY WITH BODY MASS INDEX (BMI) OF 50.0 TO 59.9 IN ADULT: Status: ACTIVE | Noted: 2024-01-23

## 2024-01-23 PROCEDURE — 3075F SYST BP GE 130 - 139MM HG: CPT | Performed by: FAMILY MEDICINE

## 2024-01-23 PROCEDURE — 99214 OFFICE O/P EST MOD 30 MIN: CPT | Performed by: FAMILY MEDICINE

## 2024-01-23 PROCEDURE — 3008F BODY MASS INDEX DOCD: CPT | Performed by: FAMILY MEDICINE

## 2024-01-23 PROCEDURE — 3079F DIAST BP 80-89 MM HG: CPT | Performed by: FAMILY MEDICINE

## 2024-01-23 RX ORDER — VALSARTAN 80 MG/1
80 TABLET ORAL
Qty: 90 TABLET | Refills: 3 | Status: SHIPPED | OUTPATIENT
Start: 2024-01-23

## 2024-01-23 RX ORDER — FLUTICASONE PROPIONATE 50 MCG
SPRAY, SUSPENSION (ML) NASAL
Qty: 16 G | Refills: 3 | Status: SHIPPED | OUTPATIENT
Start: 2024-01-23

## 2024-01-23 RX ORDER — CETIRIZINE HYDROCHLORIDE 10 MG/1
10 TABLET ORAL DAILY
Qty: 30 TABLET | Refills: 11 | Status: SHIPPED | OUTPATIENT
Start: 2024-01-23

## 2024-01-23 NOTE — PROGRESS NOTES
Subjective:   Dallin Telles is a 33 year old male who presents for Leg Swelling (Swelling in feet. Left foot is more swollen ) and Medication Request       History/Other:    Chief Complaint Reviewed and Verified  Nursing Notes Reviewed and   Verified  Tobacco Reviewed  Allergies Reviewed  Medications Reviewed    Problem List Reviewed         Tobacco:  He has never smoked tobacco.    Current Outpatient Medications   Medication Sig Dispense Refill    valsartan 80 MG Oral Tab Take 1 tablet (80 mg total) by mouth once daily. 90 tablet 3    fluticasone propionate 50 MCG/ACT Nasal Suspension Use 2 spray(s) in each nostril once daily 16 g 3    cetirizine (EQ ALLERGY RELIEF, CETIRIZINE,) 10 MG Oral Tab Take 1 tablet (10 mg total) by mouth daily. 30 tablet 11    Omeprazole 40 MG Oral Capsule Delayed Release Take 1 capsule (40 mg total) by mouth every morning before breakfast. 90 capsule 3    Tiotropium Bromide Monohydrate (SPIRIVA RESPIMAT) 1.25 MCG/ACT Inhalation Aero Soln Inhale 2 puffs into the lungs daily. 1 each 5    fluticasone furoate-vilanterol (BREO ELLIPTA) 200-25 MCG/ACT Inhalation Aerosol Powder, Breath Activated Inhale 1 puff into the lungs daily. 1 each 1    Tiotropium Bromide Monohydrate (SPIRIVA RESPIMAT) 1.25 MCG/ACT Inhalation Aero Soln Use as directed 2 sprays in the mouth or throat daily. 4 g 5    albuterol 108 (90 Base) MCG/ACT Inhalation Aero Soln Inhale 2 puffs into the lungs 4 (four) times daily as needed for Wheezing. 1 each 3    QUEtiapine Fumarate 100 MG Oral Tab Take 1 tablet (100 mg total) by mouth nightly.      Simethicone (GAS-X OR)       SF 1.1 % Dental Gel BRUSH ON TEETH IN EVENING BEFORE BED AND EXPETORATE. DO NOT RINSE  5    gabapentin 300 MG Oral Cap TAKE 1 CAPSULE BY MOUTH TWICE DAILY  0    Loperamide HCl (IMODIUM A-D OR) Take by mouth daily.      ergocalciferol 35407 units Oral Cap once a week.    0         Review of Systems:  Review of Systems   Constitutional: Negative.     Respiratory: Negative.     Cardiovascular:  Positive for leg swelling.   Gastrointestinal: Negative.    Genitourinary: Negative.    Musculoskeletal:  Positive for arthralgias and back pain.   Neurological: Negative.          Objective:   /81   Pulse 102   Ht 5' 11\" (1.803 m)   Wt (!) 417 lb (189.1 kg)   SpO2 95%   BMI 58.16 kg/m²  Estimated body mass index is 58.16 kg/m² as calculated from the following:    Height as of this encounter: 5' 11\" (1.803 m).    Weight as of this encounter: 417 lb (189.1 kg).  Physical Exam  Vitals reviewed.   Constitutional:       Appearance: Normal appearance.   Cardiovascular:      Rate and Rhythm: Normal rate and regular rhythm.      Pulses: Normal pulses.      Heart sounds: Normal heart sounds.   Pulmonary:      Effort: Pulmonary effort is normal.      Breath sounds: Normal breath sounds.   Abdominal:      Palpations: Abdomen is soft.   Musculoskeletal:      Right lower leg: Edema present.      Left lower leg: Edema present.   Neurological:      Mental Status: He is alert.           Assessment & Plan:   1. Essential hypertension (Primary)  -     CBC With Differential With Platelet  -     Comp Metabolic Panel (14)  -     Lipid Panel  -     Hemoglobin A1C  2. Low serum vitamin D  -     Vitamin D; Future; Expected date: 01/23/2024  3. Moderate persistent asthma without complication  4. Bilateral leg edema  5. Class 3 severe obesity due to excess calories without serious comorbidity with body mass index (BMI) of 50.0 to 59.9 in adult (HCC)  Other orders  -     Valsartan; Take 1 tablet (80 mg total) by mouth once daily.  Dispense: 90 tablet; Refill: 3  -     Fluticasone Propionate; Use 2 spray(s) in each nostril once daily  Dispense: 16 g; Refill: 3  -     Cetirizine HCl; Take 1 tablet (10 mg total) by mouth daily.  Dispense: 30 tablet; Refill: 11  Refilled medication.  Discussed exercise and diet to loose weight. Obtain labs over due.  Swelling likely due to his weight gain.        No follow-ups on file.    Jovon Corrales DO, 1/23/2024, 4:36 PM

## 2024-03-11 ENCOUNTER — PATIENT MESSAGE (OUTPATIENT)
Dept: FAMILY MEDICINE CLINIC | Facility: CLINIC | Age: 34
End: 2024-03-11

## 2024-03-11 NOTE — TELEPHONE ENCOUNTER
Dr Urbina- please sign pended order for tiotropium, if agreeable    LOV: 8/24/23 Last refill: 8/24/23

## 2024-03-11 NOTE — TELEPHONE ENCOUNTER
From: Dallin Telles  To: Jovon Corrales  Sent: 3/11/2024 1:48 AM CDT  Subject: Sorry+Flu Shot Question    Sorry its taking so longer to get my tests done. I've been alternating between being sick and busy. I'm going to try and get in some time this week. Again I apologize for the massive delay.    When I do come in though I was wondering if I can get my flu shot done at the same time or if I need to make an actual appointment for that?

## 2024-03-12 RX ORDER — TIOTROPIUM BROMIDE INHALATION SPRAY 1.56 UG/1
2 SPRAY, METERED RESPIRATORY (INHALATION) DAILY
Qty: 1 EACH | Refills: 5 | Status: SHIPPED | OUTPATIENT
Start: 2024-03-12

## 2024-03-13 ENCOUNTER — LAB ENCOUNTER (OUTPATIENT)
Dept: LAB | Age: 34
End: 2024-03-13
Attending: FAMILY MEDICINE
Payer: MEDICAID

## 2024-03-13 DIAGNOSIS — R79.89 LOW SERUM VITAMIN D: ICD-10-CM

## 2024-03-13 LAB
ALBUMIN SERPL-MCNC: 4.5 G/DL (ref 3.2–4.8)
ALBUMIN/GLOB SERPL: 1.5 {RATIO} (ref 1–2)
ALP LIVER SERPL-CCNC: 122 U/L
ALT SERPL-CCNC: 21 U/L
ANION GAP SERPL CALC-SCNC: <0 MMOL/L (ref 0–18)
AST SERPL-CCNC: 17 U/L (ref ?–34)
BASOPHILS # BLD AUTO: 0.02 X10(3) UL (ref 0–0.2)
BASOPHILS NFR BLD AUTO: 0.2 %
BILIRUB SERPL-MCNC: 0.5 MG/DL (ref 0.3–1.2)
BUN BLD-MCNC: 8 MG/DL (ref 9–23)
BUN/CREAT SERPL: 9.1 (ref 10–20)
CALCIUM BLD-MCNC: 9.9 MG/DL (ref 8.7–10.4)
CHLORIDE SERPL-SCNC: 107 MMOL/L (ref 98–112)
CHOLEST SERPL-MCNC: 155 MG/DL (ref ?–200)
CO2 SERPL-SCNC: 31 MMOL/L (ref 21–32)
CREAT BLD-MCNC: 0.88 MG/DL
DEPRECATED RDW RBC AUTO: 48.7 FL (ref 35.1–46.3)
EGFRCR SERPLBLD CKD-EPI 2021: 116 ML/MIN/1.73M2 (ref 60–?)
EOSINOPHIL # BLD AUTO: 0.08 X10(3) UL (ref 0–0.7)
EOSINOPHIL NFR BLD AUTO: 0.9 %
ERYTHROCYTE [DISTWIDTH] IN BLOOD BY AUTOMATED COUNT: 16 % (ref 11–15)
EST. AVERAGE GLUCOSE BLD GHB EST-MCNC: 120 MG/DL (ref 68–126)
FASTING PATIENT LIPID ANSWER: NO
FASTING STATUS PATIENT QL REPORTED: NO
GLOBULIN PLAS-MCNC: 3 G/DL (ref 2.8–4.4)
GLUCOSE BLD-MCNC: 102 MG/DL (ref 70–99)
HBA1C MFR BLD: 5.8 % (ref ?–5.7)
HCT VFR BLD AUTO: 43.3 %
HDLC SERPL-MCNC: 29 MG/DL (ref 40–59)
HGB BLD-MCNC: 13.6 G/DL
IMM GRANULOCYTES # BLD AUTO: 0.02 X10(3) UL (ref 0–1)
IMM GRANULOCYTES NFR BLD: 0.2 %
LDLC SERPL CALC-MCNC: 105 MG/DL (ref ?–100)
LYMPHOCYTES # BLD AUTO: 1.73 X10(3) UL (ref 1–4)
LYMPHOCYTES NFR BLD AUTO: 19 %
MCH RBC QN AUTO: 26.2 PG (ref 26–34)
MCHC RBC AUTO-ENTMCNC: 31.4 G/DL (ref 31–37)
MCV RBC AUTO: 83.4 FL
MONOCYTES # BLD AUTO: 0.49 X10(3) UL (ref 0.1–1)
MONOCYTES NFR BLD AUTO: 5.4 %
NEUTROPHILS # BLD AUTO: 6.77 X10 (3) UL (ref 1.5–7.7)
NEUTROPHILS # BLD AUTO: 6.77 X10(3) UL (ref 1.5–7.7)
NEUTROPHILS NFR BLD AUTO: 74.3 %
NONHDLC SERPL-MCNC: 126 MG/DL (ref ?–130)
OSMOLALITY SERPL CALC.SUM OF ELEC: 281 MOSM/KG (ref 275–295)
PLATELET # BLD AUTO: 350 10(3)UL (ref 150–450)
POTASSIUM SERPL-SCNC: 3.8 MMOL/L (ref 3.5–5.1)
PROT SERPL-MCNC: 7.5 G/DL (ref 5.7–8.2)
RBC # BLD AUTO: 5.19 X10(6)UL
SODIUM SERPL-SCNC: 136 MMOL/L (ref 136–145)
TRIGL SERPL-MCNC: 116 MG/DL (ref 30–149)
VIT D+METAB SERPL-MCNC: 12.5 NG/ML (ref 30–100)
VLDLC SERPL CALC-MCNC: 20 MG/DL (ref 0–30)
WBC # BLD AUTO: 9.1 X10(3) UL (ref 4–11)

## 2024-03-13 PROCEDURE — 80053 COMPREHEN METABOLIC PANEL: CPT | Performed by: FAMILY MEDICINE

## 2024-03-13 PROCEDURE — 36415 COLL VENOUS BLD VENIPUNCTURE: CPT

## 2024-03-13 PROCEDURE — 85025 COMPLETE CBC W/AUTO DIFF WBC: CPT | Performed by: FAMILY MEDICINE

## 2024-03-13 PROCEDURE — 83036 HEMOGLOBIN GLYCOSYLATED A1C: CPT | Performed by: FAMILY MEDICINE

## 2024-03-13 PROCEDURE — 82306 VITAMIN D 25 HYDROXY: CPT

## 2024-03-13 PROCEDURE — 80061 LIPID PANEL: CPT | Performed by: FAMILY MEDICINE

## 2024-03-25 RX ORDER — ERGOCALCIFEROL 1.25 MG/1
50000 CAPSULE ORAL WEEKLY
Qty: 4 CAPSULE | Refills: 5 | Status: SHIPPED | OUTPATIENT
Start: 2024-03-25 | End: 2024-09-09

## 2024-06-03 NOTE — TELEPHONE ENCOUNTER
Dr Urbina- please sign pended order for Kalyan Fleming, if agreeable    Last office visit: 8/24/23 Last refill: 1/23/24

## 2024-06-04 RX ORDER — FLUTICASONE FUROATE AND VILANTEROL 200; 25 UG/1; UG/1
1 POWDER RESPIRATORY (INHALATION) DAILY
Qty: 1 EACH | Refills: 1 | Status: SHIPPED | OUTPATIENT
Start: 2024-06-04

## 2024-06-05 ENCOUNTER — PATIENT MESSAGE (OUTPATIENT)
Dept: PULMONOLOGY | Facility: CLINIC | Age: 34
End: 2024-06-05

## 2024-06-06 NOTE — TELEPHONE ENCOUNTER
From: Dallin Telles  To: Ajit Urbina  Sent: 6/5/2024 5:40 PM CDT  Subject: BREO Refills    Hello, I noticed when I put in the refill request for my BREO that you only put a single refill on it (well technically two because of the refill for this month and then another for July). I assume this was to encourage me to schedule an appoint with you. I have done so now but its not going to be until September 10th. I'm going to need two more refills sent in for the BREO to cover August and the beginning of September. I hope that's ok.    Thank you.

## 2024-07-23 RX ORDER — FLUTICASONE FUROATE AND VILANTEROL 200; 25 UG/1; UG/1
1 POWDER RESPIRATORY (INHALATION) DAILY
Qty: 1 EACH | Refills: 3 | Status: SHIPPED | OUTPATIENT
Start: 2024-07-23

## 2024-07-23 NOTE — TELEPHONE ENCOUNTER
Dr. Urbina- please sign pended order for Breo, if agreeable    Last office visit: 8/24/2023 Follow up: 9/10/2024 Last refill: 6/4/2024

## 2024-07-31 ENCOUNTER — HOSPITAL ENCOUNTER (OUTPATIENT)
Age: 34
Discharge: HOME | End: 2024-07-31
Payer: MEDICAID

## 2024-07-31 VITALS
TEMPERATURE: 98 F | SYSTOLIC BLOOD PRESSURE: 156 MMHG | HEART RATE: 88 BPM | DIASTOLIC BLOOD PRESSURE: 83 MMHG | RESPIRATION RATE: 24 BRPM | OXYGEN SATURATION: 94 %

## 2024-07-31 DIAGNOSIS — H61.23 BILATERAL IMPACTED CERUMEN: Primary | ICD-10-CM

## 2024-07-31 PROCEDURE — 99213 OFFICE O/P EST LOW 20 MIN: CPT | Performed by: PHYSICIAN ASSISTANT

## 2024-08-01 NOTE — ED PROVIDER NOTES
Patient Seen in: [facility]      History     Chief Complaint   Patient presents with    Ear Problem     Stated Complaint: left ear clogged    Subjective:   HPI    Patient is a 33-year-old male that presents to immediate care due to bilateral muffled hearing.  Patient believes that he has cerumen impaction.  States that he needs his ears irrigated every year.  Denies ear pain pressure dizziness.  Has been using hydrogen peroxide.    Objective:   Past Medical History:    ADHD (attention deficit hyperactivity disorder)    Asthma ([facility])    Attention deficit hyperactivity disorder (ADHD)    Bipolar 2 disorder (HCC)    COPD (chronic obstructive pulmonary disease) (HCC)    Essential hypertension    High blood pressure    IBS (irritable bowel syndrome)    Irritable bowel syndrome with diarrhea    Pneumonia due to organism              Past Surgical History:   Procedure Laterality Date    Egd  03/2020    Tonsillectomy                  Social History     Socioeconomic History    Marital status: Single   Tobacco Use    Smoking status: Never     Passive exposure: Current    Smokeless tobacco: Never   Vaping Use    Vaping status: Never Used   Substance and Sexual Activity    Alcohol use: Never    Drug use: Never   Other Topics Concern    Caffeine Concern Yes     Comment: soda occasionally    Exercise No   Social History Narrative    The patient does not use an assistive device..      The patient does live in a home with stairs.              Review of Systems    Positive for stated Chief Complaint: Ear Problem    Other systems are as noted in HPI.  Constitutional and vital signs reviewed.      All other systems reviewed and negative except as noted above.    Physical Exam     ED Triage Vitals [07/31/24 1827]   /83   Pulse 106   Resp 24   Temp 97.7 °F (36.5 °C)   Temp src Temporal   SpO2 92 %   O2 Device None (Room air)       Current Vitals:   Vital Signs  BP: 156/83  Pulse: 88  Resp: 24  Temp: 97.7 °F (36.5  °C)  Temp src: Temporal    Oxygen Therapy  SpO2: 94 %  O2 Device: None (Room air)            Physical Exam    Vital signs reviewed. Nursing note reviewed.  Constitutional: Well-developed. Well-nourished. In no acute distress  HENT: Mucous membranes moist.  Bilateral cerumen impaction  EYES: No scleral icterus or conjunctival injection.  NECK: Full ROM. Supple.   CARDIAC: Normal rate. Normal S1/ S2. 2+ distal pulses. No edema  PULM/CHEST: Clear to auscultation bilaterally. No wheezes  Extremities: Full ROM  NEURO: Awake, alert, following commands, moving extremities, answering questions.   SKIN: Warm and dry. No rash or lesions.  PSYCH: Normal judgment. Normal affect.             MDM      Patient is a 33-year-old male with past medical history of asthma hypertension that presents to immediate care due to bilateral muffled hearing.  Patient arrives with stable vitals.  Physical exam showing bilateral cerumen impaction.  Bilateral ears irrigated.  After reevaluation TMs intact bilaterally.  Unlikely otitis externa or otitis media.  Encouraged to continue Debrox over-the-counter as needed.  Return protocols discussed.  History given by patient                                   Medical Decision Making      Disposition and Plan     Clinical Impression:  1. Bilateral impacted cerumen         Disposition:  Discharge  7/31/2024  7:32 pm    Follow-up:  No follow-up provider specified.        Medications Prescribed:  Current Discharge Medication List

## 2024-09-10 ENCOUNTER — OFFICE VISIT (OUTPATIENT)
Dept: PULMONOLOGY | Facility: CLINIC | Age: 34
End: 2024-09-10

## 2024-09-10 VITALS
WEIGHT: 315 LBS | OXYGEN SATURATION: 94 % | DIASTOLIC BLOOD PRESSURE: 92 MMHG | SYSTOLIC BLOOD PRESSURE: 140 MMHG | HEART RATE: 106 BPM | HEIGHT: 70 IN | RESPIRATION RATE: 16 BRPM | BODY MASS INDEX: 45.1 KG/M2

## 2024-09-10 DIAGNOSIS — J45.20 MILD INTERMITTENT ASTHMA WITHOUT COMPLICATION (HCC): Primary | ICD-10-CM

## 2024-09-10 PROCEDURE — 99213 OFFICE O/P EST LOW 20 MIN: CPT | Performed by: INTERNAL MEDICINE

## 2024-09-10 RX ORDER — TIOTROPIUM BROMIDE INHALATION SPRAY 1.56 UG/1
2 SPRAY, METERED RESPIRATORY (INHALATION) DAILY
Qty: 1 EACH | Refills: 11 | Status: SHIPPED | OUTPATIENT
Start: 2024-09-10

## 2024-09-10 RX ORDER — BUDESONIDE AND FORMOTEROL FUMARATE DIHYDRATE 160; 4.5 UG/1; UG/1
2 AEROSOL RESPIRATORY (INHALATION) 2 TIMES DAILY
Qty: 1 EACH | Refills: 11 | Status: SHIPPED | OUTPATIENT
Start: 2024-09-10

## 2024-09-10 NOTE — PROGRESS NOTES
Referring Physician  Jovon Corrales DO    History of Present Illness  Patient seen today for follow-up visit to pulmonary clinic.  Asthma symptoms relatively well controlled over the course last year.  Denies significant exacerbations requiring hospitalization.  No significant wheezing or cough.  Compliant with Spiriva and and Symbicort    Medications  Current Outpatient Medications on File Prior to Visit   Medication Sig Dispense Refill    fluticasone furoate-vilanterol (BREO ELLIPTA) 200-25 MCG/ACT Inhalation Aerosol Powder, Breath Activated Inhale 1 puff into the lungs daily. 1 each 3    Tiotropium Bromide Monohydrate (SPIRIVA RESPIMAT) 1.25 MCG/ACT Inhalation Aero Soln Inhale 2 puffs into the lungs daily. 1 each 5    valsartan 80 MG Oral Tab Take 1 tablet (80 mg total) by mouth once daily. 90 tablet 3    fluticasone propionate 50 MCG/ACT Nasal Suspension Use 2 spray(s) in each nostril once daily 16 g 3    cetirizine (EQ ALLERGY RELIEF, CETIRIZINE,) 10 MG Oral Tab Take 1 tablet (10 mg total) by mouth daily. 30 tablet 11    Omeprazole 40 MG Oral Capsule Delayed Release Take 1 capsule (40 mg total) by mouth every morning before breakfast. 90 capsule 3    albuterol 108 (90 Base) MCG/ACT Inhalation Aero Soln Inhale 2 puffs into the lungs 4 (four) times daily as needed for Wheezing. 1 each 3    QUEtiapine Fumarate 100 MG Oral Tab Take 1 tablet (100 mg total) by mouth nightly.      Simethicone (GAS-X OR)       SF 1.1 % Dental Gel BRUSH ON TEETH IN EVENING BEFORE BED AND EXPETORATE. DO NOT RINSE  5    gabapentin 300 MG Oral Cap TAKE 1 CAPSULE BY MOUTH TWICE DAILY  0    Loperamide HCl (IMODIUM A-D OR) Take by mouth daily.      ergocalciferol 60806 units Oral Cap once a week.    0     No current facility-administered medications on file prior to visit.       Allergies  Allergies   Allergen Reactions    Haldol [Haloperidol] OTHER (SEE COMMENTS)     Cannot sleep, psychiatric problem    Mucinex Dm Maximum Strength  [Dextromethorphan-Guaifenesin] OTHER (SEE COMMENTS)     Stomach cramps    Reglan [Metoclopramide] OTHER (SEE COMMENTS)     Per doctor, patient cannot take. Pt is on seroquel, and it is worsening his tremors    Zofran [Ondansetron] OTHER (SEE COMMENTS)     constipation       Physical Exam  Constitutional: no acute distress  HEENT: PERRL  Cardio: RRR, S1 S2  Respiratory: clear to auscultation bilaterally, no wheezing, rales, rhonchi, crackles  GI: abdomen soft, non tender  Extremities: no clubbing, cyanosis, edema  Neurologic: no gross motor deficits  Skin: warm, dry  Lymphatic: no supraclavicular lymphadenopathy     Assessment  1.  Extrinsic asthma  2.  Possible BERNARDO  3.  Obesity      Plan  -Patient seen today for follow-up visit for management of asthma.  Asthma symptoms well controlled at this time.  Advised patient to continue Spiriva and Symbicort.  Urged avoidance of known triggers.  -Patient prefers to hold off polysomnography at this time.    Ajit Urbina,   Pulmonary Medicine

## 2024-09-13 ENCOUNTER — PATIENT MESSAGE (OUTPATIENT)
Dept: PULMONOLOGY | Facility: CLINIC | Age: 34
End: 2024-09-13

## 2024-09-13 ENCOUNTER — TELEPHONE (OUTPATIENT)
Dept: PULMONOLOGY | Facility: CLINIC | Age: 34
End: 2024-09-13

## 2024-09-13 NOTE — TELEPHONE ENCOUNTER
Left voicemail for patient to call office back to let us know if he prefers Breo or Symbicort.    Per pharmacy patient just picked up refill of Breo

## 2024-09-13 NOTE — TELEPHONE ENCOUNTER
He does not be on duplicate therapy can we confirm the patient's and if he prefers Symbicort or Breo whichever one he prefers I will place prescription for that

## 2024-09-13 NOTE — TELEPHONE ENCOUNTER
[facility] pharmacy in [place] called to inform Dr. Urbina that the insurance rejected the prescription due to the patient being on \"duplicate therapy.\"    The pharmacist was given the # for the [facility] therapy help desk [phone].        Budesonide-Formoterol Fumarate (SYMBICORT) 160-4.5 MCG/ACT Inhalation Aerosol, Inhale 2 puffs into the lungs 2 (two) times daily., Disp: 1 each, Rfl: 11

## 2024-09-14 ENCOUNTER — HOSPITAL ENCOUNTER (EMERGENCY)
Facility: HOSPITAL | Age: 34
Discharge: HOME | End: 2024-09-14
Payer: MEDICAID

## 2024-09-14 ENCOUNTER — APPOINTMENT (OUTPATIENT)
Dept: ULTRASOUND IMAGING | Facility: HOSPITAL | Age: 34
End: 2024-09-14
Attending: NURSE PRACTITIONER
Payer: MEDICAID

## 2024-09-14 VITALS
RESPIRATION RATE: 16 BRPM | DIASTOLIC BLOOD PRESSURE: 93 MMHG | HEIGHT: 70.2 IN | BODY MASS INDEX: 45.1 KG/M2 | SYSTOLIC BLOOD PRESSURE: 137 MMHG | OXYGEN SATURATION: 95 % | HEART RATE: 99 BPM | WEIGHT: 315 LBS | TEMPERATURE: 98 F

## 2024-09-14 DIAGNOSIS — N45.1 EPIDIDYMITIS: Primary | ICD-10-CM

## 2024-09-14 LAB
BILIRUB UR QL: NEGATIVE
CLARITY UR: CLEAR
COLOR UR: COLORLESS
GLUCOSE UR-MCNC: NORMAL MG/DL
HGB UR QL STRIP.AUTO: NEGATIVE
KETONES UR-MCNC: NEGATIVE MG/DL
LEUKOCYTE ESTERASE UR QL STRIP.AUTO: NEGATIVE
NITRITE UR QL STRIP.AUTO: NEGATIVE
PH UR: 7.5 [PH] (ref 5–8)
PROT UR-MCNC: NEGATIVE MG/DL
SP GR UR STRIP: <1.005 (ref 1–1.03)
UROBILINOGEN UR STRIP-ACNC: NORMAL

## 2024-09-14 PROCEDURE — 87591 N.GONORRHOEAE DNA AMP PROB: CPT | Performed by: NURSE PRACTITIONER

## 2024-09-14 PROCEDURE — 93975 VASCULAR STUDY: CPT | Performed by: NURSE PRACTITIONER

## 2024-09-14 PROCEDURE — 99284 EMERGENCY DEPT VISIT MOD MDM: CPT

## 2024-09-14 PROCEDURE — 76870 US EXAM SCROTUM: CPT | Performed by: NURSE PRACTITIONER

## 2024-09-14 PROCEDURE — 87491 CHLMYD TRACH DNA AMP PROBE: CPT | Performed by: NURSE PRACTITIONER

## 2024-09-14 PROCEDURE — 81003 URINALYSIS AUTO W/O SCOPE: CPT

## 2024-09-14 RX ORDER — LEVOFLOXACIN 500 MG/1
500 TABLET, FILM COATED ORAL ONCE
Status: COMPLETED | OUTPATIENT
Start: 2024-09-14 | End: 2024-09-14

## 2024-09-14 RX ORDER — LEVOFLOXACIN 500 MG/1
500 TABLET, FILM COATED ORAL DAILY
Qty: 10 TABLET | Refills: 0 | Status: SHIPPED | OUTPATIENT
Start: 2024-09-14 | End: 2024-09-24

## 2024-09-14 NOTE — ED PROVIDER NOTES
Patient Seen in: [facility] Emergency Department      History     Chief Complaint   Patient presents with    Eval-G     Stated Complaint: Eval-G    Subjective:   33yom w hx of ADHD, asthma, bipolar, COPD, HTN reports to the ED w c/o left testicular pain. Sharp, resolved. On and off starting this AM. No urinary symptoms. Actually improved w urination. No trauma. No hx of surgery. No rashes. No sick contacts.             Objective:   Past Medical History:    ADHD (attention deficit hyperactivity disorder)    Asthma (HCC)    Attention deficit hyperactivity disorder (ADHD)    Bipolar 2 disorder (HCC)    COPD (chronic obstructive pulmonary disease) (HCC)    Essential hypertension    High blood pressure    IBS (irritable bowel syndrome)    Irritable bowel syndrome with diarrhea    Pneumonia due to organism              Past Surgical History:   Procedure Laterality Date    Egd  03/2020    Tonsillectomy                  Social History     Socioeconomic History    Marital status: Single   Tobacco Use    Smoking status: Never     Passive exposure: Current    Smokeless tobacco: Never   Vaping Use    Vaping status: Never Used   Substance and Sexual Activity    Alcohol use: Never    Drug use: Never   Other Topics Concern    Caffeine Concern Yes     Comment: soda occasionally    Exercise No   Social History Narrative    The patient does not use an assistive device..      The patient does live in a home with stairs.              Review of Systems   All other systems reviewed and are negative.      Positive for stated Chief Complaint: Eval-G    Other systems are as noted in HPI.  Constitutional and vital signs reviewed.      All other systems reviewed and negative except as noted above.    Physical Exam     ED Triage Vitals [09/14/24 1656]   /88   Pulse 117   Resp 16   Temp 98.2 °F (36.8 °C)   Temp src Oral   SpO2 97 %   O2 Device None (Room air)       Current Vitals:   Vital Signs  BP: 134/88  Pulse: 117  Resp:  16  Temp: 98.2 °F (36.8 °C)  Temp src: Oral    Oxygen Therapy  SpO2: 97 %  O2 Device: None (Room air)            Physical Exam  Vitals and nursing note reviewed.   Constitutional:       General: He is not in acute distress.     Appearance: He is well-developed.   HENT:      Head: Normocephalic and atraumatic.      Nose: Nose normal.      Mouth/Throat:      Mouth: Mucous membranes are moist.   Eyes:      Conjunctiva/sclera: Conjunctivae normal.      Pupils: Pupils are equal, round, and reactive to light.   Cardiovascular:      Rate and Rhythm: Normal rate and regular rhythm.      Heart sounds: Normal heart sounds.   Pulmonary:      Effort: Pulmonary effort is normal.      Breath sounds: Normal breath sounds.   Abdominal:      General: Bowel sounds are normal.      Palpations: Abdomen is soft.   Genitourinary:     Penis: Normal.       Testes: Normal.   Musculoskeletal:         General: No tenderness or deformity. Normal range of motion.      Cervical back: Normal range of motion and neck supple.   Skin:     General: Skin is warm and dry.      Capillary Refill: Capillary refill takes less than 2 seconds.      Findings: No rash.      Comments: Normal color   Neurological:      General: No focal deficit present.      Mental Status: He is alert and oriented to person, place, and time.      GCS: GCS eye subscore is 4. GCS verbal subscore is 5. GCS motor subscore is 6.      Cranial Nerves: No cranial nerve deficit.      Gait: Gait normal.             ED Course     Labs Reviewed   URINALYSIS WITH CULTURE REFLEX - Abnormal; Notable for the following components:       Result Value    Urine Color Colorless (*)     Spec Gravity <1.005 (*)     All other components within normal limits   CHLAMYDIA/GONOCOCCUS, BRETT        Impression  CONCLUSION:     Mild bilateral epididymitis.     1.5 cm complex right epididymal head cyst.     Normal arterial and venous flow to the bilateral testes.     Multiple other incidental findings as  described in the body of the report.           Dictated by (CST): Mich De La Cruz MD on 9/14/2024 at 7:54 PM      Finalized by (CST): Mich De La Cruz MD on 9/14/2024 at 7:56 PM                MDM                       Medical Decision Making  32yo/m w hx and exam as stated; testicular pain    Us with epididymitis    He is not concerned for std  No fever  No flank pain  No urinary symptoms    Plan  Culture urine  Levaquin  Close fu            Amount and/or Complexity of Data Reviewed  Labs:  Decision-making details documented in ED Course.  Radiology:  Decision-making details documented in ED Course.    Risk  OTC drugs.  Prescription drug management.        Disposition and Plan     Clinical Impression:  1. Epididymitis         Disposition:  Discharge  9/14/2024  8:08 pm    Follow-up:  Star Guzman MD  [address] [place] [id number]  [phone]    Follow up in 2 day(s)            Medications Prescribed:  Current Discharge Medication List        START taking these medications    Details   levoFLOXacin 500 MG Oral Tab Take 1 tablet (500 mg total) by mouth daily for 10 days.  Qty: 10 tablet, Refills: 0

## 2024-09-14 NOTE — ED INITIAL ASSESSMENT (HPI)
Pt ambulatory through triage c/o testicle pain radiating up to L groin starting ~0200 this am. Resolved upon urination. Woke this am w/ pain w/ no relief. Denies urinary issues.

## 2024-09-15 NOTE — ED QUICK NOTES
Pt requesting to verify antibiotic prescribed at time of discharge will not interfere or cause reaction with his home medications he currently is taking. ED pharmacist at bedside and addressed pt's concerns and questions regarding his medications and antibiotic prescription. Pt verbalized understanding.       Written discharge instructions regarding epididymitis pain were discussed with pt including rx meds, f/u w/ pcp and return to er for worsening conditions. Pt verbalized understanding

## 2024-09-16 LAB
C TRACH DNA SPEC QL NAA+PROBE: NEGATIVE
N GONORRHOEA DNA SPEC QL NAA+PROBE: NEGATIVE

## 2024-09-16 NOTE — TELEPHONE ENCOUNTER
From: Dallin Telles  To: Ajit Urbina  Sent: 9/13/2024 4:55 PM CDT  Subject: Prescription Mixup    I got a voicemail today saying I had Symbicort filled instead of BREO. I need BREO and Spiriva filled with an insurance exception on the BREO so that my insurance will cover it. Thank you.

## 2024-09-16 NOTE — TELEPHONE ENCOUNTER
Spoke to pharmacy, Symbicort prescription cancelled. Per pharmacy, no authorization needed for Breo. [employer] message sent to patient

## 2024-09-16 NOTE — TELEPHONE ENCOUNTER
Dallin Price Pulmo Clinical Staff (supporting Ajit Urbina DO)3 days ago     RH  I got a voicemail today saying I had Symbicort filled instead of BREO. I need BREO and Spiriva filled with an insurance exception on the BREO so that my insurance will cover it. Thank you.

## 2024-09-17 ENCOUNTER — TELEPHONE (OUTPATIENT)
Dept: PULMONOLOGY | Facility: CLINIC | Age: 34
End: 2024-09-17

## 2024-09-18 RX ORDER — FLUTICASONE PROPIONATE 50 MCG
SPRAY, SUSPENSION (ML) NASAL
Qty: 48 G | Refills: 3 | Status: SHIPPED | OUTPATIENT
Start: 2024-09-18

## 2024-09-18 NOTE — TELEPHONE ENCOUNTER
Refill passes per [facility] protocol.    Future Appointments   Date Time Provider Department Center   10/10/2024  4:45 PM Jovon Corrales DO [facility]     Last office visit: 1/23/2024    Requested Prescriptions   Pending Prescriptions Disp Refills    fluticasone propionate 50 MCG/ACT Nasal Suspension 16 g 3     Sig: Use 2 spray(s) in each nostril once daily       Allergy Medication Protocol Passed - 9/13/2024  4:56 PM        Passed - In person appointment or virtual visit in the past 12 mos or appointment in next 3 mos     Recent Outpatient Visits              1 week ago Mild intermittent asthma without complication (HCC)    [facility] [place]    Office Visit    7 months ago Essential hypertension    [facility], [address], [place],     Office Visit    1 year ago Mild intermittent extrinsic asthma without complication (HCC)    [facility] [place],     Office Visit    1 year ago Mild intermittent asthma without complication (HCC)    [facility], [place]    Office Visit    1 year ago Influenza A    [facility], [place] Jovon Corrales, DO    Office Visit          Future Appointments         Provider Department Appt Notes    In 3 weeks Jovon Corrales DO [facility] I have concerning marks on my legs.                         Future Appointments         Provider Department Appt Notes    In 3 weeks Jovon Corrales DO [facility] I have concerning marks on my legs.          Recent Outpatient Visits              1 week ago Mild intermittent asthma without complication (HCC)    [facility], [place] [place]     Office Visit    7 months ago Essential hypertension    [facility], [address], Jovon Woodruff,     Office Visit    1 year ago Mild intermittent extrinsic asthma without complication ([facility])    [facility], [facility], Ajit Zhang, DO    Office Visit    1 year ago Mild intermittent asthma without complication ([facility])    [facility], [facility], Ajit Zhang, DO    Office Visit    1 year ago Influenza A    [facility], [address], Jovon Woodruff, DO    Office Visit

## 2024-10-31 ENCOUNTER — OFFICE VISIT (OUTPATIENT)
Dept: FAMILY MEDICINE CLINIC | Facility: CLINIC | Age: 34
End: 2024-10-31
Payer: MEDICAID

## 2024-10-31 VITALS
BODY MASS INDEX: 45.1 KG/M2 | SYSTOLIC BLOOD PRESSURE: 132 MMHG | HEART RATE: 105 BPM | OXYGEN SATURATION: 93 % | WEIGHT: 315 LBS | DIASTOLIC BLOOD PRESSURE: 79 MMHG | HEIGHT: 70 IN

## 2024-10-31 DIAGNOSIS — L98.9 SKIN LESION OF LEFT LEG: Primary | ICD-10-CM

## 2024-10-31 PROCEDURE — 99213 OFFICE O/P EST LOW 20 MIN: CPT | Performed by: FAMILY MEDICINE

## 2024-10-31 NOTE — PROGRESS NOTES
Subjective:   Dallin Telles is a 34 year old male who presents for Bump (Bumps on left leg he's had for years and new one just came in)       History/Other:    Chief Complaint Reviewed and Verified  Nursing Notes Reviewed and   Verified  Tobacco Reviewed  Allergies Reviewed  Medications Reviewed    Problem List Reviewed  Medical History Reviewed  Surgical History   Reviewed  Family History Reviewed  Social History Reviewed         Tobacco:  He has never smoked tobacco.    Current Outpatient Medications   Medication Sig Dispense Refill    fluticasone propionate 50 MCG/ACT Nasal Suspension Use 2 spray(s) in each nostril once daily 48 g 3    Budesonide-Formoterol Fumarate (SYMBICORT) 160-4.5 MCG/ACT Inhalation Aerosol Inhale 2 puffs into the lungs 2 (two) times daily. 1 each 11    Tiotropium Bromide Monohydrate (SPIRIVA RESPIMAT) 1.25 MCG/ACT Inhalation Aero Soln Inhale 2 puffs into the lungs daily. 1 each 11    fluticasone furoate-vilanterol (BREO ELLIPTA) 200-25 MCG/ACT Inhalation Aerosol Powder, Breath Activated Inhale 1 puff into the lungs daily. 1 each 3    valsartan 80 MG Oral Tab Take 1 tablet (80 mg total) by mouth once daily. 90 tablet 3    cetirizine (EQ ALLERGY RELIEF, CETIRIZINE,) 10 MG Oral Tab Take 1 tablet (10 mg total) by mouth daily. 30 tablet 11    Omeprazole 40 MG Oral Capsule Delayed Release Take 1 capsule (40 mg total) by mouth every morning before breakfast. 90 capsule 3    albuterol 108 (90 Base) MCG/ACT Inhalation Aero Soln Inhale 2 puffs into the lungs 4 (four) times daily as needed for Wheezing. 1 each 3    QUEtiapine Fumarate 100 MG Oral Tab Take 1 tablet (100 mg total) by mouth nightly.      Simethicone (GAS-X OR)       gabapentin 300 MG Oral Cap TAKE 1 CAPSULE BY MOUTH TWICE DAILY  0    Loperamide HCl (IMODIUM A-D OR) Take by mouth daily.      Tiotropium Bromide Monohydrate (SPIRIVA RESPIMAT) 1.25 MCG/ACT Inhalation Aero Soln Inhale 2 puffs into the lungs daily. 1 each 5     ergocalciferol 96977 units Oral Cap once a week.   (Patient not taking: Reported on 10/31/2024)  0         Review of Systems:  Review of Systems   Constitutional: Negative.    Skin:  Positive for color change.        Nodule left lower leg         Objective:   /79 (BP Location: Right arm, Patient Position: Sitting, Cuff Size: large)   Pulse 105   Ht 5' 10\" (1.778 m)   Wt (!) 416 lb (188.7 kg)   SpO2 93%   BMI 59.69 kg/m²  Estimated body mass index is 59.69 kg/m² as calculated from the following:    Height as of this encounter: 5' 10\" (1.778 m).    Weight as of this encounter: 416 lb (188.7 kg).  Physical Exam  Skin:     Comments: Left anterior ankle area one cm raised, hyperpigmented nodular papule. Similar smaller one near.  Non tender.            Assessment & Plan:   1. Skin lesion of left leg (Primary)  -     DERM - INTERNAL    Likely a fibroma.  Not sure why this other satelite lesion appears.  See derm.  Non tender.     No follow-ups on file.    Jovon Corrales DO, 10/31/2024, 9:35 AM

## 2024-11-25 NOTE — TELEPHONE ENCOUNTER
Dr. Urbina- please sign pended order for Breo, if agreeable    Last office visit: 9/10/2024  Last refill: 7/23/2024

## 2024-11-26 RX ORDER — FLUTICASONE FUROATE AND VILANTEROL 200; 25 UG/1; UG/1
1 POWDER RESPIRATORY (INHALATION) DAILY
Qty: 1 EACH | Refills: 3 | Status: SHIPPED | OUTPATIENT
Start: 2024-11-26

## 2024-11-27 RX ORDER — OMEPRAZOLE 40 MG/1
40 CAPSULE, DELAYED RELEASE ORAL
Qty: 90 CAPSULE | Refills: 3 | Status: SHIPPED | OUTPATIENT
Start: 2024-11-27

## 2024-12-23 ENCOUNTER — TELEPHONE (OUTPATIENT)
Dept: PULMONOLOGY | Facility: CLINIC | Age: 34
End: 2024-12-23

## 2024-12-23 NOTE — TELEPHONE ENCOUNTER
PA is about to  for     Medication Quantity Refills Start End   fluticasone furoate-vilanterol (BREO ELLIPTA) 200-25 MCG/ACT Inhalation Aerosol Powder, Breath Activated 1 each 3 2024 --   Sig:   Inhale 1 puff into the lungs daily.     Key:[id number]  [employer]

## 2025-02-06 RX ORDER — CETIRIZINE HYDROCHLORIDE 10 MG/1
10 TABLET ORAL DAILY
Qty: 90 TABLET | Refills: 3 | Status: SHIPPED | OUTPATIENT
Start: 2025-02-06

## 2025-02-06 RX ORDER — VALSARTAN 80 MG/1
80 TABLET ORAL
Qty: 90 TABLET | Refills: 3 | Status: SHIPPED | OUTPATIENT
Start: 2025-02-06

## 2025-02-07 NOTE — TELEPHONE ENCOUNTER
Refill passed per [facility] protocol.    Requested Prescriptions   Pending Prescriptions Disp Refills    valsartan 80 MG Oral Tab 90 tablet 3     Sig: Take 1 tablet (80 mg total) by mouth once daily.       Hypertension Medications Protocol Passed - 2/6/2025  8:34 PM        Passed - CMP or BMP in past 12 months        Passed - Last BP reading less than 140/90     BP Readings from Last 1 Encounters:   10/31/24 132/79               Passed - In person appointment or virtual visit in the past 12 mos or appointment in next 3 mos     Recent Outpatient Visits              3 months ago Skin lesion of left leg    [facility], [address], Jovon Woodruff, DO    Office Visit    4 months ago Mild intermittent asthma without complication (HCC)    [facility], [facility], [place]    Office Visit    1 year ago Essential hypertension    [facility], [address], [place]    Office Visit    1 year ago Mild intermittent extrinsic asthma without complication (HCC)    [facility], [facility], [place]    Office Visit    2 years ago Mild intermittent asthma without complication (HCC)    [facility], [facility], [place]    Office Visit                      Passed - EGFRCR or GFRNAA > 50     GFR Evaluation  EGFRCR: 116 , resulted on 3/13/2024          Passed - Medication is active on med list      Refill passed per [facility] protocol.      cetirizine (EQ ALLERGY RELIEF, CETIRIZINE,) 10 MG Oral Tab 30 tablet 11     Sig: Take 1 tablet (10 mg total) by mouth daily.       Allergy Medication Protocol Passed - 2/6/2025  8:34 PM        Passed - In person appointment or virtual visit in the past 12 mos or appointment in next 3 mos     Recent Outpatient Visits              3 months ago Skin lesion of left leg    [place]  [facility], [address], Jovon Woodruff, DO    Office Visit    4 months ago Mild intermittent asthma without complication (HCC)    [facility], [facility], Ajit Zhang, DO    Office Visit    1 year ago Essential hypertension    [facility], [address], Jovon Woodruff, DO    Office Visit    1 year ago Mild intermittent extrinsic asthma without complication (HCC)    [facility], [facility], Ajit Zhang, DO    Office Visit    2 years ago Mild intermittent asthma without complication (HCC)    [facility], [facility], Ajit Zhang, DO    Office Visit                      Passed - Medication is active on med list

## 2025-03-24 NOTE — TELEPHONE ENCOUNTER
Dr Urbina- please sign pended order for Breo Ellipta 200-25, if agreeable    Last office visit: 9/10/24  Last refill: 11/26/24

## 2025-03-26 RX ORDER — FLUTICASONE FUROATE AND VILANTEROL 200; 25 UG/1; UG/1
1 POWDER RESPIRATORY (INHALATION) DAILY
Qty: 1 EACH | Refills: 3 | Status: SHIPPED | OUTPATIENT
Start: 2025-03-26

## 2025-05-22 ENCOUNTER — TELEPHONE (OUTPATIENT)
Dept: FAMILY MEDICINE CLINIC | Facility: CLINIC | Age: 35
End: 2025-05-22

## 2025-05-22 DIAGNOSIS — I10 ESSENTIAL HYPERTENSION: Primary | ICD-10-CM

## 2025-05-22 NOTE — TELEPHONE ENCOUNTER
Patient called to request blood work, patient states a Dr. Urbina is requesting it be done by the patient.       Please call for any questions. Patient would like to be notified once order is ready also.

## 2025-05-23 NOTE — TELEPHONE ENCOUNTER
Please see patient message requesting labs.  Patient scheduled for overdue annual follow up 6/16/25.  Please advise if labs can be completed prior to visit or at time of visit.

## 2025-05-29 ENCOUNTER — OFFICE VISIT (OUTPATIENT)
Dept: FAMILY MEDICINE CLINIC | Facility: CLINIC | Age: 35
End: 2025-05-29
Payer: MEDICAID

## 2025-05-29 VITALS
OXYGEN SATURATION: 91 % | BODY MASS INDEX: 45.1 KG/M2 | RESPIRATION RATE: 22 BRPM | HEART RATE: 109 BPM | WEIGHT: 315 LBS | DIASTOLIC BLOOD PRESSURE: 88 MMHG | HEIGHT: 70 IN | SYSTOLIC BLOOD PRESSURE: 138 MMHG

## 2025-05-29 DIAGNOSIS — K21.9 GASTROESOPHAGEAL REFLUX DISEASE WITHOUT ESOPHAGITIS: ICD-10-CM

## 2025-05-29 DIAGNOSIS — E66.813 CLASS 3 SEVERE OBESITY WITH BODY MASS INDEX (BMI) OF 50.0 TO 59.9 IN ADULT: ICD-10-CM

## 2025-05-29 DIAGNOSIS — I10 ESSENTIAL HYPERTENSION: ICD-10-CM

## 2025-05-29 DIAGNOSIS — Z00.00 ROUTINE GENERAL MEDICAL EXAMINATION AT A HEALTH CARE FACILITY: Primary | ICD-10-CM

## 2025-05-29 DIAGNOSIS — F41.1 GENERALIZED ANXIETY DISORDER: ICD-10-CM

## 2025-05-29 PROBLEM — R45.851 SUICIDAL IDEATION: Status: RESOLVED | Noted: 2021-03-16 | Resolved: 2025-05-29

## 2025-05-29 PROCEDURE — 99395 PREV VISIT EST AGE 18-39: CPT | Performed by: FAMILY MEDICINE

## 2025-05-29 NOTE — PROGRESS NOTES
Subjective:   Dallin Telles is a 34 year old male who presents for Physical       History/Other:    Chief Complaint Reviewed and Verified  No Further Nursing Notes to   Review  Allergies Reviewed  Medications Reviewed  Problem List Reviewed           Tobacco:       Current Medications[1]      Review of Systems:  Review of Systems   Constitutional: Negative.    HENT: Negative.     Eyes: Negative.    Respiratory: Negative.     Cardiovascular: Negative.    Gastrointestinal: Negative.    Endocrine: Negative for polydipsia, polyphagia and polyuria.   Genitourinary: Negative.    Musculoskeletal: Negative.    Skin: Negative.         No mole changes   Allergic/Immunologic: Negative for environmental allergies.   Neurological:  Negative for dizziness, weakness, numbness and headaches.   Hematological: Negative.    Psychiatric/Behavioral:  Negative for sleep disturbance.         No anxiety or depressed feelings         Objective:   /88   Pulse 109   Resp 22   Ht 5' 10\" (1.778 m)   Wt (!) 421 lb (191 kg)   SpO2 91%   BMI 60.41 kg/m²  Estimated body mass index is 60.41 kg/m² as calculated from the following:    Height as of this encounter: 5' 10\" (1.778 m).    Weight as of this encounter: 421 lb (191 kg).  Physical Exam  Vitals reviewed.   Constitutional:       Appearance: Normal appearance. He is well-developed. He is obese.   HENT:      Head: Normocephalic.      Right Ear: Tympanic membrane, ear canal and external ear normal.      Left Ear: Tympanic membrane, ear canal and external ear normal.      Nose: Nose normal.   Eyes:      General: Lids are normal.      Conjunctiva/sclera: Conjunctivae normal.      Pupils: Pupils are equal, round, and reactive to light.      Funduscopic exam:     Right eye: No hemorrhage or papilledema.         Left eye: No hemorrhage or papilledema.   Neck:      Vascular: Normal carotid pulses. No JVD.      Trachea: Trachea normal.   Cardiovascular:      Rate and Rhythm: Regular  rhythm.      Pulses:           Carotid pulses are 2+ on the right side and 2+ on the left side.       Radial pulses are 2+ on the right side and 2+ on the left side.      Heart sounds: Normal heart sounds.   Pulmonary:      Breath sounds: Normal breath sounds.   Abdominal:      Tenderness: There is no abdominal tenderness.   Musculoskeletal:      Cervical back: Normal, normal range of motion and neck supple.      Thoracic back: Normal.      Lumbar back: Normal.      Right lower leg: Edema present.      Left lower leg: Edema present.   Lymphadenopathy:      Cervical: No cervical adenopathy.   Skin:     Comments: No suspicious lesions waist up exam   Neurological:      General: No focal deficit present.      Mental Status: He is alert and oriented to person, place, and time.      Sensory: No sensory deficit.      Deep Tendon Reflexes: Reflexes are normal and symmetric.   Psychiatric:         Mood and Affect: Mood normal. Mood is not anxious or depressed.           Assessment & Plan:   1. Routine general medical examination at a health care facility (Primary)  2. Essential hypertension  3. Class 3 severe obesity with body mass index (BMI) of 50.0 to 59.9 in adult  4. Gastroesophageal reflux disease without esophagitis  5. Generalized anxiety disorder    Labs ordered.  Sees psychiatrist for medications.  Fairly good control of BP but could be better - weight loss and more physical activity .  Monitor BP in six months.        No follow-ups on file.    Jovon Corrales DO, 5/29/2025, 5:16 PM        [1]   Current Outpatient Medications   Medication Sig Dispense Refill    fluticasone furoate-vilanterol (BREO ELLIPTA) 200-25 MCG/ACT Inhalation Aerosol Powder, Breath Activated Inhale 1 puff into the lungs daily. 1 each 3    valsartan 80 MG Oral Tab Take 1 tablet (80 mg total) by mouth once daily. 90 tablet 3    cetirizine (EQ ALLERGY RELIEF, CETIRIZINE,) 10 MG Oral Tab Take 1 tablet (10 mg total) by mouth daily. 90 tablet 3     Omeprazole 40 MG Oral Capsule Delayed Release Take 1 capsule (40 mg total) by mouth every morning before breakfast. 90 capsule 3    fluticasone propionate 50 MCG/ACT Nasal Suspension Use 2 spray(s) in each nostril once daily 48 g 3    Tiotropium Bromide Monohydrate (SPIRIVA RESPIMAT) 1.25 MCG/ACT Inhalation Aero Soln Inhale 2 puffs into the lungs daily. 1 each 11    albuterol 108 (90 Base) MCG/ACT Inhalation Aero Soln Inhale 2 puffs into the lungs 4 (four) times daily as needed for Wheezing. 1 each 3    QUEtiapine Fumarate 100 MG Oral Tab Take 1 tablet (100 mg total) by mouth nightly.      Simethicone (GAS-X OR)       gabapentin 300 MG Oral Cap TAKE 1 CAPSULE BY MOUTH TWICE DAILY  0    Loperamide HCl (IMODIUM A-D OR) Take by mouth daily.

## 2025-05-30 ENCOUNTER — LAB ENCOUNTER (OUTPATIENT)
Dept: LAB | Age: 35
End: 2025-05-30
Attending: FAMILY MEDICINE
Payer: MEDICAID

## 2025-05-30 LAB
ALBUMIN SERPL-MCNC: 4.5 G/DL (ref 3.2–4.8)
ALBUMIN/GLOB SERPL: 1.6 {RATIO} (ref 1–2)
ALP LIVER SERPL-CCNC: 112 U/L (ref 45–117)
ALT SERPL-CCNC: 18 U/L (ref 10–49)
ANION GAP SERPL CALC-SCNC: 7 MMOL/L (ref 0–18)
AST SERPL-CCNC: 19 U/L (ref ?–34)
BASOPHILS # BLD AUTO: 0.02 X10(3) UL (ref 0–0.2)
BASOPHILS NFR BLD AUTO: 0.2 %
BILIRUB SERPL-MCNC: 0.4 MG/DL (ref 0.3–1.2)
BUN BLD-MCNC: 7 MG/DL (ref 9–23)
BUN/CREAT SERPL: 7.3 (ref 10–20)
CALCIUM BLD-MCNC: 9.5 MG/DL (ref 8.7–10.4)
CHLORIDE SERPL-SCNC: 101 MMOL/L (ref 98–112)
CHOLEST SERPL-MCNC: 146 MG/DL (ref ?–200)
CO2 SERPL-SCNC: 32 MMOL/L (ref 21–32)
CREAT BLD-MCNC: 0.96 MG/DL (ref 0.7–1.3)
DEPRECATED RDW RBC AUTO: 47.9 FL (ref 35.1–46.3)
EGFRCR SERPLBLD CKD-EPI 2021: 106 ML/MIN/1.73M2 (ref 60–?)
EOSINOPHIL # BLD AUTO: 0.23 X10(3) UL (ref 0–0.7)
EOSINOPHIL NFR BLD AUTO: 2.5 %
ERYTHROCYTE [DISTWIDTH] IN BLOOD BY AUTOMATED COUNT: 16 % (ref 11–15)
EST. AVERAGE GLUCOSE BLD GHB EST-MCNC: 120 MG/DL (ref 68–126)
FASTING PATIENT LIPID ANSWER: YES
FASTING STATUS PATIENT QL REPORTED: YES
GLOBULIN PLAS-MCNC: 2.8 G/DL (ref 2–3.5)
GLUCOSE BLD-MCNC: 95 MG/DL (ref 70–99)
HBA1C MFR BLD: 5.8 % (ref ?–5.7)
HCT VFR BLD AUTO: 40.9 % (ref 39–53)
HDLC SERPL-MCNC: 31 MG/DL (ref 40–59)
HGB BLD-MCNC: 12.8 G/DL (ref 13–17.5)
IMM GRANULOCYTES # BLD AUTO: 0.03 X10(3) UL (ref 0–1)
IMM GRANULOCYTES NFR BLD: 0.3 %
LDLC SERPL CALC-MCNC: 93 MG/DL (ref ?–100)
LYMPHOCYTES # BLD AUTO: 1.52 X10(3) UL (ref 1–4)
LYMPHOCYTES NFR BLD AUTO: 16.4 %
MCH RBC QN AUTO: 25.7 PG (ref 26–34)
MCHC RBC AUTO-ENTMCNC: 31.3 G/DL (ref 31–37)
MCV RBC AUTO: 82 FL (ref 80–100)
MONOCYTES # BLD AUTO: 0.59 X10(3) UL (ref 0.1–1)
MONOCYTES NFR BLD AUTO: 6.4 %
NEUTROPHILS # BLD AUTO: 6.9 X10 (3) UL (ref 1.5–7.7)
NEUTROPHILS # BLD AUTO: 6.9 X10(3) UL (ref 1.5–7.7)
NEUTROPHILS NFR BLD AUTO: 74.2 %
NONHDLC SERPL-MCNC: 115 MG/DL (ref ?–130)
OSMOLALITY SERPL CALC.SUM OF ELEC: 288 MOSM/KG (ref 275–295)
PLATELET # BLD AUTO: 337 10(3)UL (ref 150–450)
POTASSIUM SERPL-SCNC: 3.9 MMOL/L (ref 3.5–5.1)
PROT SERPL-MCNC: 7.3 G/DL (ref 5.7–8.2)
RBC # BLD AUTO: 4.99 X10(6)UL (ref 4.3–5.7)
SODIUM SERPL-SCNC: 140 MMOL/L (ref 136–145)
TRIGL SERPL-MCNC: 121 MG/DL (ref 30–149)
VLDLC SERPL CALC-MCNC: 20 MG/DL (ref 0–30)
WBC # BLD AUTO: 9.3 X10(3) UL (ref 4–11)

## 2025-05-30 PROCEDURE — 80053 COMPREHEN METABOLIC PANEL: CPT | Performed by: FAMILY MEDICINE

## 2025-05-30 PROCEDURE — 85025 COMPLETE CBC W/AUTO DIFF WBC: CPT | Performed by: FAMILY MEDICINE

## 2025-05-30 PROCEDURE — 80061 LIPID PANEL: CPT | Performed by: FAMILY MEDICINE

## 2025-05-30 PROCEDURE — 36415 COLL VENOUS BLD VENIPUNCTURE: CPT | Performed by: FAMILY MEDICINE

## 2025-05-30 PROCEDURE — 83036 HEMOGLOBIN GLYCOSYLATED A1C: CPT | Performed by: FAMILY MEDICINE

## 2025-06-02 ENCOUNTER — APPOINTMENT (OUTPATIENT)
Dept: GENERAL RADIOLOGY | Age: 35
End: 2025-06-02
Attending: PHYSICIAN ASSISTANT
Payer: MEDICAID

## 2025-06-02 ENCOUNTER — HOSPITAL ENCOUNTER (OUTPATIENT)
Age: 35
Discharge: HOME | End: 2025-06-02
Payer: MEDICAID

## 2025-06-02 VITALS
TEMPERATURE: 98 F | OXYGEN SATURATION: 97 % | RESPIRATION RATE: 18 BRPM | HEART RATE: 94 BPM | SYSTOLIC BLOOD PRESSURE: 152 MMHG | DIASTOLIC BLOOD PRESSURE: 65 MMHG

## 2025-06-02 DIAGNOSIS — B34.9 VIRAL ILLNESS: ICD-10-CM

## 2025-06-02 DIAGNOSIS — R03.0 ELEVATED BLOOD PRESSURE READING: ICD-10-CM

## 2025-06-02 DIAGNOSIS — J18.9 COMMUNITY ACQUIRED PNEUMONIA, UNSPECIFIED LATERALITY: Primary | ICD-10-CM

## 2025-06-02 DIAGNOSIS — R05.1 ACUTE COUGH: ICD-10-CM

## 2025-06-02 DIAGNOSIS — J45.901 ASTHMA WITH ACUTE EXACERBATION, UNSPECIFIED ASTHMA SEVERITY, UNSPECIFIED WHETHER PERSISTENT (HCC): ICD-10-CM

## 2025-06-02 PROCEDURE — U0002 COVID-19 LAB TEST NON-CDC: HCPCS | Performed by: EMERGENCY MEDICINE

## 2025-06-02 PROCEDURE — 99214 OFFICE O/P EST MOD 30 MIN: CPT | Performed by: EMERGENCY MEDICINE

## 2025-06-02 PROCEDURE — 87502 INFLUENZA DNA AMP PROBE: CPT | Performed by: EMERGENCY MEDICINE

## 2025-06-02 PROCEDURE — 71046 X-RAY EXAM CHEST 2 VIEWS: CPT | Performed by: PHYSICIAN ASSISTANT

## 2025-06-02 PROCEDURE — 94640 AIRWAY INHALATION TREATMENT: CPT | Performed by: EMERGENCY MEDICINE

## 2025-06-02 RX ORDER — BENZONATATE 100 MG/1
100 CAPSULE ORAL 3 TIMES DAILY PRN
Qty: 30 CAPSULE | Refills: 0 | Status: SHIPPED | OUTPATIENT
Start: 2025-06-02 | End: 2025-07-02

## 2025-06-02 RX ORDER — DOXYCYCLINE 100 MG/1
100 CAPSULE ORAL 2 TIMES DAILY
Qty: 10 CAPSULE | Refills: 0 | Status: SHIPPED | OUTPATIENT
Start: 2025-06-02 | End: 2025-06-07

## 2025-06-02 RX ORDER — PREDNISONE 20 MG/1
40 TABLET ORAL DAILY
Qty: 10 TABLET | Refills: 0 | Status: SHIPPED | OUTPATIENT
Start: 2025-06-02 | End: 2025-06-02

## 2025-06-02 RX ORDER — ALBUTEROL SULFATE 90 UG/1
2 INHALANT RESPIRATORY (INHALATION) EVERY 4 HOURS PRN
Qty: 1 EACH | Refills: 0 | Status: SHIPPED | OUTPATIENT
Start: 2025-06-02 | End: 2025-07-02

## 2025-06-02 RX ORDER — PREDNISONE 10 MG/1
10 TABLET ORAL DAILY
Qty: 5 TABLET | Refills: 0 | Status: SHIPPED | OUTPATIENT
Start: 2025-06-02 | End: 2025-06-07

## 2025-06-02 RX ORDER — IPRATROPIUM BROMIDE AND ALBUTEROL SULFATE 2.5; .5 MG/3ML; MG/3ML
3 SOLUTION RESPIRATORY (INHALATION) ONCE
Status: COMPLETED | OUTPATIENT
Start: 2025-06-02 | End: 2025-06-02

## 2025-06-02 NOTE — ED INITIAL ASSESSMENT (HPI)
Cough, runny nose that started 4 days ago. Runny nose has subsided but cough has gotten worse over time and developed wheezing as well. Hx of asthma and PNA w/ hospitalization in 2014, concerned for PNA. Taking inhaler w/ no relief.

## 2025-06-02 NOTE — ED PROVIDER NOTES
Patient Seen in: [facility]    History     Chief Complaint   Patient presents with    Cough     Stated Complaint: Cough    HPI    Dallin Telles is a 34 year old male presents with chief complaint of cough.  Onset 4 days ago.  Patient reports associated clear rhinorrhea, which has improved.  Patient reports worsening of cough.  Patient reports associated intermittent wheeze.  Patient reports history of asthma and states that symptoms are similar to previous acute exacerbations.  Patient denies fever, chills, earache, sore throat, abdominal pain, nausea, vomiting, diarrhea, constipation, dysuria, hematuria, flank pain, rash, neck pain, neck swelling, restricted neck movement, dyspnea, hemoptysis, extremity pain, extremity swelling.      Past Medical History[1]    Past Surgical History[2]         Family History[3]    Short Social Hx on File[4]    Review of Systems    Positive for stated complaint: Cough  Other systems are as noted in HPI.  Constitutional and vital signs reviewed.      All other systems reviewed and negative except as noted above.    PSFH elements reviewed from today and agreed except as otherwise stated in HPI.    Physical Exam     ED Triage Vitals [06/02/25 0811]   /65   Pulse 94   Resp 18   Temp 98.2 °F (36.8 °C)   Temp src Oral   SpO2 97 %   O2 Device None (Room air)       Current:/65   Pulse 94   Temp 98.2 °F (36.8 °C) (Oral)   Resp 18   SpO2 97%     PULSE OX within normal limits on room air as interpreted by this provider.     Constitutional: The patient is cooperative. Appears well-developed and well-nourished.  No acute distress.  Psychological: Alert, No abnormalities of mood, affect.  Head: Normocephalic/atraumatic.   Eyes: Pupils are equal round reactive to light.  Conjunctiva are within normal limits.  ENT: Pharynx noninjected.  Tonsils within normal size limits bilaterally.  No tonsillar exudates.  TMs within normal limits bilaterally.  Mucous membranes  moist.  Neck: The neck is supple.  Nontender.  No meningeal signs.  Chest: There is no tenderness to the chest wall.  No CVA tenderness bilaterally.  Respiratory: Respiratory effort was normal.  Decreased breath sounds right lung.  There is no rales, wheezes, or rhonchi.  There is no stridor.  Air entry is equal.  Cardiovascular: Regular rate and rhythm.  Capillary refill is brisk.    Lymphatic: No gross lymphadenopathy noted.  Musculoskeletal: Musculoskeletal system is grossly intact.  There is no obvious deformity.  Neurological: Gross motor movement is intact in all 4 extremities.  Patient exhibits normal speech.  Skin: Skin is normal to inspection.  Warm and dry.  No obvious bruising.  No obvious rash.        ED Course     Labs Reviewed   POCT FLU TEST - Normal    Narrative:     This assay is a rapid molecular in vitro test utilizing nucleic acid amplification of influenza A and B viral RNA.   RAPID SARS-COV-2 BY PCR - Normal       MDM     Differential diagnosis including but not limited to URI, bronchitis, pneumonia, acute asthma exacerbation    Influenza negative.  COVID-19 negative.    Radiology findings: XR CHEST PA + LAT CHEST (CPT=71046)  Result Date: 6/2/2025  CONCLUSION:  1. Borderline heart size.  Prominent central vasculature 2. Mild interstitial prominence right middle lobe has developed may reflect mild pneumonitis. 3. No pleural effusion   Dictated by (CST): Dakota Manning MD on 6/02/2025 at 9:02 AM     Finalized by (CST): Dakota Manning MD on 6/02/2025 at 9:05 AM          Chest x-ray images independently reviewed by this provider-positive infiltrate right lung.    Lungs clear to auscultation bilaterally without wheeze, rales or rhonchi prior to discharge.  Remainder of physical exam remained stable over serial reexaminations as previously documented.  Results reviewed with patient.    I have given the patient instructions regarding their diagnoses, expectations, follow up, and ER  precautions. I explained to the patient that emergent conditions may arise and to go to the ER for new, worsening or any persistent conditions. I've explained the importance of following up with their doctor as instructed. The patient verbalized understanding of the discharge instructions and plan.    The patient was informed of their elevated blood pressure reading at immediate care.  They were informed of the dangers of undiagnosed and untreated hypertension.  Education regarding lifestyle modifications and the need for appropriate follow-up with their PCP to have their blood pressure re-checked within 24-48 hours was provided.     Disposition and Plan     Clinical Impression:  1. Community acquired pneumonia, unspecified laterality    2. Viral illness    3. Acute cough    4. Asthma with acute exacerbation, unspecified asthma severity, unspecified whether persistent (HCC)    5. Elevated blood pressure reading        Disposition:  Discharge    Follow-up:  Jovon Corrales  [facility] [address]  [phone]    Call in 1 day  For follow-up      Medications Prescribed:  Discharge Medication List as of 6/2/2025  9:11 AM        START taking these medications    Details   !! albuterol 108 (90 Base) MCG/ACT Inhalation Aero Soln Inhale 2 puffs into the lungs every 4 (four) hours as needed for Wheezing., Normal, Disp-1 each, R-0      Spacer/Aero-Holding Chambers Does not apply Device Use with albuterol inhaler, Normal, Disp-1 each, R-0      benzonatate 100 MG Oral Cap Take 1 capsule (100 mg total) by mouth 3 (three) times daily as needed for cough., Normal, Disp-30 capsule, R-0      amoxicillin clavulanate 875-125 MG Oral Tab Take 1 tablet by mouth 2 (two) times daily for 5 days., Normal, Disp-10 tablet, R-0      doxycycline 100 MG Oral Cap Take 1 capsule (100 mg total) by mouth 2 (two) times daily for 5 days., Normal, Disp-10 capsule, R-0      predniSONE 20 MG Oral Tab Take 2 tablets (40 mg total) by mouth  daily for 5 days., Normal, Disp-10 tablet, R-0       !! - Potential duplicate medications found. Please discuss with provider.                       [1]   Past Medical History:   ADHD (attention deficit hyperactivity disorder)    Asthma (HCC)    Attention deficit hyperactivity disorder (ADHD)    Bipolar 2 disorder (HCC)    COPD (chronic obstructive pulmonary disease) (HCC)    Essential hypertension    High blood pressure    IBS (irritable bowel syndrome)    Irritable bowel syndrome with diarrhea    Pneumonia due to organism   [2]   Past Surgical History:  Procedure Laterality Date    Egd  03/2020    Tonsillectomy     [3]   Family History  Problem Relation Age of Onset    Diabetes Father     Hypertension Father     Cancer Maternal Grandmother         lung cancer    Cancer Paternal Grandmother         lung cancer, colon cancer    Cancer Paternal Grandfather         colon cancer   [4]   Social History  Socioeconomic History    Marital status: Single   Tobacco Use    Smoking status: Never     Passive exposure: Current    Smokeless tobacco: Never   Vaping Use    Vaping status: Never Used   Substance and Sexual Activity    Alcohol use: Never    Drug use: Never   Other Topics Concern    Caffeine Concern Yes     Comment: soda occasionally    Exercise No   Social History Narrative    The patient does not use an assistive device..      The patient does live in a home with stairs.

## 2025-07-20 DIAGNOSIS — J45.20 MILD INTERMITTENT ASTHMA WITHOUT COMPLICATION (HCC): ICD-10-CM

## 2025-07-21 RX ORDER — TIOTROPIUM BROMIDE INHALATION SPRAY 1.56 UG/1
2 SPRAY, METERED RESPIRATORY (INHALATION) DAILY
Qty: 1 EACH | Refills: 11 | OUTPATIENT
Start: 2025-07-21

## 2025-07-21 NOTE — TELEPHONE ENCOUNTER
Dr. Urbina- please sign pended order for Breo 200, if agreeable    Last office visit: 9/10/2024 Follow up: none Last refill: 3/26/2025

## 2025-07-22 RX ORDER — FLUTICASONE FUROATE AND VILANTEROL 200; 25 UG/1; UG/1
1 POWDER RESPIRATORY (INHALATION) DAILY
Qty: 1 EACH | Refills: 3 | Status: SHIPPED | OUTPATIENT
Start: 2025-07-22

## (undated) DEVICE — SNARE CAPTI HEX STIFF MEDIUM

## (undated) DEVICE — 3 ML SYRINGE LUER-LOCK TIP: Brand: MONOJECT

## (undated) DEVICE — CONMED SCOPE SAVER BITE BLOCK, 20X27 MM: Brand: SCOPE SAVER

## (undated) DEVICE — FORCEP RADIAL JAW 4

## (undated) DEVICE — 6 ML SYRINGE LUER-LOCK TIP: Brand: MONOJECT

## (undated) DEVICE — 35 ML SYRINGE REGULAR TIP: Brand: MONOJECT

## (undated) DEVICE — LINE MNTR ADLT SET O2 INTMD

## (undated) DEVICE — MEDI-VAC NON-CONDUCTIVE SUCTION TUBING 6MM X 1.8M (6FT.) L: Brand: CARDINAL HEALTH

## (undated) DEVICE — Device: Brand: CUSTOM PROCEDURE KIT

## (undated) NOTE — LETTER
09/10/21        Hamida Ivan  [address] [address]      Dear Nav Ortega,    As you know, regular medical attention is essential to maintaining your health.   Your request  refill for Omeprazole will be  authorized and will be given to your

## (undated) NOTE — LETTER
12/20/2021              [address] [place] [address]         To Whom it may concern: This is to certify that Barbara Pastor had an appointment on 12/20/2021 at 5:14 PM with Arnoldo Morales DO.         Sincerely,

## (undated) NOTE — LETTER
Date & Time: 1/11/2022, 2:33 PM  Patient: Aleena Nowak  Encounter Provider(s):    INES Baron       To Whom It May Concern:    Julianna Diana was seen and treated in our department on 1/11/2022. He may return to work on 1/13/2022.     If you have

## (undated) NOTE — LETTER
Date & Time: 9/16/2019, 9:22 AM  Patient: Rosary Salvage  Encounter Provider(s):    Yanira Terrazas MD       To Whom It May Concern:    Smita Manriquez was seen and treated in our department on 9/16/2019.  He Should not attend partial hospitalization progr

## (undated) NOTE — LETTER
Date & Time: 8/30/2021, 2:45 PM  Patient: Angel Bragg  Encounter Provider(s):    HYACINTH Ventura       To Whom It May Concern:    Jim Ochoa was seen and treated in our department on 8/30/2021.  He should not return to work until september 3, 20

## (undated) NOTE — LETTER
2/18/2022              [address] [place] [address]         To Whom it may concern: This is to certify that Joelle Caputo had an appointment on 2/18/2022 at 4:01 PM with Vessie Fothergill, DO. 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        Sincerely,    Vessie Fothergill, DO [facility], [employer]   [address]  [address] [address] [address] [phone]        Document electronically generated by:  Vessie Fothergill, DO

## (undated) NOTE — LETTER
7/12/2021              [address] [place] [address]         To Whom it may concern: This is to certify that Jeremy Hernandez had an appointment on 7/12/2021 at 10:17 AM with Irene Jeff DO.   Off work today due

## (undated) NOTE — LETTER
1/10/2022              [address] [place] [address]         To Whom it may concern: This is to certify that Kelton Diop had an appointment on 1/10/2022 at 1:52 PM with Dalton Hernandez DO.   He may return to wo

## (undated) NOTE — LETTER
Date & Time: 6/30/2021, 2:11 PM  Patient: Orlando Mejia  Encounter Provider(s):    HYACINTH Elizabeth       To Whom It May Concern:    Lazarus Guerrero was seen and treated in our department on 6/30/2021. He should not return to work until 7/2/21.     If yo

## (undated) NOTE — LETTER
Date & Time: 11/11/2021, 3:42 PM  Patient: Prince Nielsen  Encounter Provider(s):    HYACINTH Camacho       To Whom It May Concern:    Allyson Call was seen and treated in our department on 11/11/2021. He can return to work Monday 11/15/2021.     I